# Patient Record
Sex: FEMALE | Race: BLACK OR AFRICAN AMERICAN | NOT HISPANIC OR LATINO
[De-identification: names, ages, dates, MRNs, and addresses within clinical notes are randomized per-mention and may not be internally consistent; named-entity substitution may affect disease eponyms.]

---

## 2023-01-01 ENCOUNTER — APPOINTMENT (OUTPATIENT)
Dept: PULMONOLOGY | Facility: CLINIC | Age: 68
End: 2023-01-01
Payer: MEDICARE

## 2023-01-01 ENCOUNTER — NON-APPOINTMENT (OUTPATIENT)
Age: 68
End: 2023-01-01

## 2023-01-01 ENCOUNTER — INPATIENT (INPATIENT)
Facility: HOSPITAL | Age: 68
LOS: 10 days | Discharge: HOME HEALTH | End: 2023-08-17
Attending: INTERNAL MEDICINE | Admitting: INTERNAL MEDICINE
Payer: MEDICARE

## 2023-01-01 ENCOUNTER — APPOINTMENT (OUTPATIENT)
Dept: DERMATOLOGY | Facility: CLINIC | Age: 68
End: 2023-01-01

## 2023-01-01 ENCOUNTER — TRANSCRIPTION ENCOUNTER (OUTPATIENT)
Age: 68
End: 2023-01-01

## 2023-01-01 ENCOUNTER — APPOINTMENT (OUTPATIENT)
Dept: PULMONOLOGY | Facility: CLINIC | Age: 68
End: 2023-01-01

## 2023-01-01 VITALS
TEMPERATURE: 99 F | DIASTOLIC BLOOD PRESSURE: 66 MMHG | SYSTOLIC BLOOD PRESSURE: 149 MMHG | HEART RATE: 111 BPM | RESPIRATION RATE: 19 BRPM | OXYGEN SATURATION: 88 %

## 2023-01-01 VITALS
TEMPERATURE: 98 F | RESPIRATION RATE: 18 BRPM | OXYGEN SATURATION: 97 % | HEART RATE: 95 BPM | DIASTOLIC BLOOD PRESSURE: 67 MMHG | SYSTOLIC BLOOD PRESSURE: 125 MMHG

## 2023-01-01 DIAGNOSIS — R06.02 SHORTNESS OF BREATH: ICD-10-CM

## 2023-01-01 DIAGNOSIS — C34.90 MALIGNANT NEOPLASM OF UNSPECIFIED PART OF UNSPECIFIED BRONCHUS OR LUNG: ICD-10-CM

## 2023-01-01 DIAGNOSIS — C7A.8 OTHER MALIGNANT NEUROENDOCRINE TUMORS: ICD-10-CM

## 2023-01-01 DIAGNOSIS — D72.829 ELEVATED WHITE BLOOD CELL COUNT, UNSPECIFIED: ICD-10-CM

## 2023-01-01 DIAGNOSIS — C7B.8 OTHER MALIGNANT NEUROENDOCRINE TUMORS-C7A.8: ICD-10-CM

## 2023-01-01 DIAGNOSIS — E11.9 TYPE 2 DIABETES MELLITUS WITHOUT COMPLICATIONS: ICD-10-CM

## 2023-01-01 DIAGNOSIS — J96.01 ACUTE RESPIRATORY FAILURE WITH HYPOXIA: ICD-10-CM

## 2023-01-01 DIAGNOSIS — M89.9 DISORDER OF BONE, UNSPECIFIED: ICD-10-CM

## 2023-01-01 LAB
A1C WITH ESTIMATED AVERAGE GLUCOSE RESULT: 9 % — HIGH (ref 4–5.6)
ALBUMIN SERPL ELPH-MCNC: 3.1 G/DL — LOW (ref 3.3–5)
ALBUMIN SERPL ELPH-MCNC: 3.6 G/DL — SIGNIFICANT CHANGE UP (ref 3.3–5)
ALBUMIN SERPL ELPH-MCNC: 3.7 G/DL — SIGNIFICANT CHANGE UP (ref 3.3–5)
ALP SERPL-CCNC: 71 U/L — SIGNIFICANT CHANGE UP (ref 40–120)
ALP SERPL-CCNC: 79 U/L — SIGNIFICANT CHANGE UP (ref 40–120)
ALP SERPL-CCNC: 82 U/L — SIGNIFICANT CHANGE UP (ref 40–120)
ALT FLD-CCNC: 13 U/L — SIGNIFICANT CHANGE UP (ref 4–33)
ALT FLD-CCNC: 14 U/L — SIGNIFICANT CHANGE UP (ref 4–33)
ALT FLD-CCNC: 14 U/L — SIGNIFICANT CHANGE UP (ref 4–33)
ANION GAP SERPL CALC-SCNC: 10 MMOL/L — SIGNIFICANT CHANGE UP (ref 7–14)
ANION GAP SERPL CALC-SCNC: 10 MMOL/L — SIGNIFICANT CHANGE UP (ref 7–14)
ANION GAP SERPL CALC-SCNC: 11 MMOL/L — SIGNIFICANT CHANGE UP (ref 7–14)
ANION GAP SERPL CALC-SCNC: 12 MMOL/L — SIGNIFICANT CHANGE UP (ref 7–14)
ANION GAP SERPL CALC-SCNC: 12 MMOL/L — SIGNIFICANT CHANGE UP (ref 7–14)
ANION GAP SERPL CALC-SCNC: 13 MMOL/L — SIGNIFICANT CHANGE UP (ref 7–14)
ANION GAP SERPL CALC-SCNC: 8 MMOL/L — SIGNIFICANT CHANGE UP (ref 7–14)
APTT BLD: 28.7 SEC — SIGNIFICANT CHANGE UP (ref 24.5–35.6)
APTT BLD: 30.3 SEC — SIGNIFICANT CHANGE UP (ref 24.5–35.6)
AST SERPL-CCNC: 26 U/L — SIGNIFICANT CHANGE UP (ref 4–32)
AST SERPL-CCNC: 29 U/L — SIGNIFICANT CHANGE UP (ref 4–32)
AST SERPL-CCNC: 31 U/L — SIGNIFICANT CHANGE UP (ref 4–32)
B PERT DNA SPEC QL NAA+PROBE: SIGNIFICANT CHANGE UP
B PERT+PARAPERT DNA PNL SPEC NAA+PROBE: SIGNIFICANT CHANGE UP
BASOPHILS # BLD AUTO: 0.06 K/UL — SIGNIFICANT CHANGE UP (ref 0–0.2)
BASOPHILS # BLD AUTO: 0.07 K/UL — SIGNIFICANT CHANGE UP (ref 0–0.2)
BASOPHILS # BLD AUTO: 0.08 K/UL — SIGNIFICANT CHANGE UP (ref 0–0.2)
BASOPHILS # BLD AUTO: 0.09 K/UL — SIGNIFICANT CHANGE UP (ref 0–0.2)
BASOPHILS NFR BLD AUTO: 0.4 % — SIGNIFICANT CHANGE UP (ref 0–2)
BASOPHILS NFR BLD AUTO: 0.4 % — SIGNIFICANT CHANGE UP (ref 0–2)
BASOPHILS NFR BLD AUTO: 0.5 % — SIGNIFICANT CHANGE UP (ref 0–2)
BASOPHILS NFR BLD AUTO: 0.5 % — SIGNIFICANT CHANGE UP (ref 0–2)
BASOPHILS NFR BLD AUTO: 0.6 % — SIGNIFICANT CHANGE UP (ref 0–2)
BASOPHILS NFR BLD AUTO: 0.6 % — SIGNIFICANT CHANGE UP (ref 0–2)
BILIRUB SERPL-MCNC: 0.2 MG/DL — SIGNIFICANT CHANGE UP (ref 0.2–1.2)
BILIRUB SERPL-MCNC: 0.4 MG/DL — SIGNIFICANT CHANGE UP (ref 0.2–1.2)
BILIRUB SERPL-MCNC: 0.4 MG/DL — SIGNIFICANT CHANGE UP (ref 0.2–1.2)
BLD GP AB SCN SERPL QL: NEGATIVE — SIGNIFICANT CHANGE UP
BORDETELLA PARAPERTUSSIS (RAPRVP): SIGNIFICANT CHANGE UP
BUN SERPL-MCNC: 10 MG/DL — SIGNIFICANT CHANGE UP (ref 7–23)
BUN SERPL-MCNC: 11 MG/DL — SIGNIFICANT CHANGE UP (ref 7–23)
BUN SERPL-MCNC: 6 MG/DL — LOW (ref 7–23)
BUN SERPL-MCNC: 8 MG/DL — SIGNIFICANT CHANGE UP (ref 7–23)
BUN SERPL-MCNC: 9 MG/DL — SIGNIFICANT CHANGE UP (ref 7–23)
C PNEUM DNA SPEC QL NAA+PROBE: SIGNIFICANT CHANGE UP
CALCIUM SERPL-MCNC: 10 MG/DL — SIGNIFICANT CHANGE UP (ref 8.4–10.5)
CALCIUM SERPL-MCNC: 10.2 MG/DL — SIGNIFICANT CHANGE UP (ref 8.4–10.5)
CALCIUM SERPL-MCNC: 10.3 MG/DL — SIGNIFICANT CHANGE UP (ref 8.4–10.5)
CALCIUM SERPL-MCNC: 10.4 MG/DL — SIGNIFICANT CHANGE UP (ref 8.4–10.5)
CALCIUM SERPL-MCNC: 9.6 MG/DL — SIGNIFICANT CHANGE UP (ref 8.4–10.5)
CALCIUM SERPL-MCNC: 9.7 MG/DL — SIGNIFICANT CHANGE UP (ref 8.4–10.5)
CALCIUM SERPL-MCNC: 9.7 MG/DL — SIGNIFICANT CHANGE UP (ref 8.4–10.5)
CALCIUM SERPL-MCNC: 9.8 MG/DL — SIGNIFICANT CHANGE UP (ref 8.4–10.5)
CALCIUM SERPL-MCNC: 9.8 MG/DL — SIGNIFICANT CHANGE UP (ref 8.4–10.5)
CHLORIDE SERPL-SCNC: 92 MMOL/L — LOW (ref 98–107)
CHLORIDE SERPL-SCNC: 93 MMOL/L — LOW (ref 98–107)
CHLORIDE SERPL-SCNC: 94 MMOL/L — LOW (ref 98–107)
CHLORIDE SERPL-SCNC: 94 MMOL/L — LOW (ref 98–107)
CHLORIDE SERPL-SCNC: 95 MMOL/L — LOW (ref 98–107)
CHLORIDE SERPL-SCNC: 96 MMOL/L — LOW (ref 98–107)
CHLORIDE SERPL-SCNC: 97 MMOL/L — LOW (ref 98–107)
CHLORIDE SERPL-SCNC: 97 MMOL/L — LOW (ref 98–107)
CO2 SERPL-SCNC: 24 MMOL/L — SIGNIFICANT CHANGE UP (ref 22–31)
CO2 SERPL-SCNC: 25 MMOL/L — SIGNIFICANT CHANGE UP (ref 22–31)
CO2 SERPL-SCNC: 27 MMOL/L — SIGNIFICANT CHANGE UP (ref 22–31)
CREAT SERPL-MCNC: 0.5 MG/DL — SIGNIFICANT CHANGE UP (ref 0.5–1.3)
CREAT SERPL-MCNC: 0.53 MG/DL — SIGNIFICANT CHANGE UP (ref 0.5–1.3)
CREAT SERPL-MCNC: 0.54 MG/DL — SIGNIFICANT CHANGE UP (ref 0.5–1.3)
CREAT SERPL-MCNC: 0.54 MG/DL — SIGNIFICANT CHANGE UP (ref 0.5–1.3)
CREAT SERPL-MCNC: 0.56 MG/DL — SIGNIFICANT CHANGE UP (ref 0.5–1.3)
CREAT SERPL-MCNC: 0.57 MG/DL — SIGNIFICANT CHANGE UP (ref 0.5–1.3)
CREAT SERPL-MCNC: 0.6 MG/DL — SIGNIFICANT CHANGE UP (ref 0.5–1.3)
CREAT SERPL-MCNC: 0.6 MG/DL — SIGNIFICANT CHANGE UP (ref 0.5–1.3)
CREAT SERPL-MCNC: 0.61 MG/DL — SIGNIFICANT CHANGE UP (ref 0.5–1.3)
CULTURE RESULTS: SIGNIFICANT CHANGE UP
EGFR: 100 ML/MIN/1.73M2 — SIGNIFICANT CHANGE UP
EGFR: 100 ML/MIN/1.73M2 — SIGNIFICANT CHANGE UP
EGFR: 101 ML/MIN/1.73M2 — SIGNIFICANT CHANGE UP
EGFR: 102 ML/MIN/1.73M2 — SIGNIFICANT CHANGE UP
EGFR: 97 ML/MIN/1.73M2 — SIGNIFICANT CHANGE UP
EGFR: 98 ML/MIN/1.73M2 — SIGNIFICANT CHANGE UP
EGFR: 98 ML/MIN/1.73M2 — SIGNIFICANT CHANGE UP
EGFR: 99 ML/MIN/1.73M2 — SIGNIFICANT CHANGE UP
EGFR: 99 ML/MIN/1.73M2 — SIGNIFICANT CHANGE UP
EOSINOPHIL # BLD AUTO: 0.11 K/UL — SIGNIFICANT CHANGE UP (ref 0–0.5)
EOSINOPHIL # BLD AUTO: 0.22 K/UL — SIGNIFICANT CHANGE UP (ref 0–0.5)
EOSINOPHIL # BLD AUTO: 0.3 K/UL — SIGNIFICANT CHANGE UP (ref 0–0.5)
EOSINOPHIL # BLD AUTO: 0.32 K/UL — SIGNIFICANT CHANGE UP (ref 0–0.5)
EOSINOPHIL # BLD AUTO: 0.33 K/UL — SIGNIFICANT CHANGE UP (ref 0–0.5)
EOSINOPHIL # BLD AUTO: 0.34 K/UL — SIGNIFICANT CHANGE UP (ref 0–0.5)
EOSINOPHIL NFR BLD AUTO: 0.7 % — SIGNIFICANT CHANGE UP (ref 0–6)
EOSINOPHIL NFR BLD AUTO: 1.3 % — SIGNIFICANT CHANGE UP (ref 0–6)
EOSINOPHIL NFR BLD AUTO: 1.9 % — SIGNIFICANT CHANGE UP (ref 0–6)
EOSINOPHIL NFR BLD AUTO: 2.3 % — SIGNIFICANT CHANGE UP (ref 0–6)
EOSINOPHIL NFR BLD AUTO: 2.3 % — SIGNIFICANT CHANGE UP (ref 0–6)
EOSINOPHIL NFR BLD AUTO: 2.4 % — SIGNIFICANT CHANGE UP (ref 0–6)
ESTIMATED AVERAGE GLUCOSE: 212 — SIGNIFICANT CHANGE UP
FLUAV SUBTYP SPEC NAA+PROBE: SIGNIFICANT CHANGE UP
FLUBV RNA SPEC QL NAA+PROBE: SIGNIFICANT CHANGE UP
GLUCOSE BLDC GLUCOMTR-MCNC: 106 MG/DL — HIGH (ref 70–99)
GLUCOSE BLDC GLUCOMTR-MCNC: 118 MG/DL — HIGH (ref 70–99)
GLUCOSE BLDC GLUCOMTR-MCNC: 118 MG/DL — HIGH (ref 70–99)
GLUCOSE BLDC GLUCOMTR-MCNC: 122 MG/DL — HIGH (ref 70–99)
GLUCOSE BLDC GLUCOMTR-MCNC: 130 MG/DL — HIGH (ref 70–99)
GLUCOSE BLDC GLUCOMTR-MCNC: 136 MG/DL — HIGH (ref 70–99)
GLUCOSE BLDC GLUCOMTR-MCNC: 141 MG/DL — HIGH (ref 70–99)
GLUCOSE BLDC GLUCOMTR-MCNC: 143 MG/DL — HIGH (ref 70–99)
GLUCOSE BLDC GLUCOMTR-MCNC: 144 MG/DL — HIGH (ref 70–99)
GLUCOSE BLDC GLUCOMTR-MCNC: 144 MG/DL — HIGH (ref 70–99)
GLUCOSE BLDC GLUCOMTR-MCNC: 152 MG/DL — HIGH (ref 70–99)
GLUCOSE BLDC GLUCOMTR-MCNC: 153 MG/DL — HIGH (ref 70–99)
GLUCOSE BLDC GLUCOMTR-MCNC: 153 MG/DL — HIGH (ref 70–99)
GLUCOSE BLDC GLUCOMTR-MCNC: 154 MG/DL — HIGH (ref 70–99)
GLUCOSE BLDC GLUCOMTR-MCNC: 157 MG/DL — HIGH (ref 70–99)
GLUCOSE BLDC GLUCOMTR-MCNC: 157 MG/DL — HIGH (ref 70–99)
GLUCOSE BLDC GLUCOMTR-MCNC: 161 MG/DL — HIGH (ref 70–99)
GLUCOSE BLDC GLUCOMTR-MCNC: 161 MG/DL — HIGH (ref 70–99)
GLUCOSE BLDC GLUCOMTR-MCNC: 167 MG/DL — HIGH (ref 70–99)
GLUCOSE BLDC GLUCOMTR-MCNC: 169 MG/DL — HIGH (ref 70–99)
GLUCOSE BLDC GLUCOMTR-MCNC: 175 MG/DL — HIGH (ref 70–99)
GLUCOSE BLDC GLUCOMTR-MCNC: 178 MG/DL — HIGH (ref 70–99)
GLUCOSE BLDC GLUCOMTR-MCNC: 178 MG/DL — HIGH (ref 70–99)
GLUCOSE BLDC GLUCOMTR-MCNC: 179 MG/DL — HIGH (ref 70–99)
GLUCOSE BLDC GLUCOMTR-MCNC: 181 MG/DL — HIGH (ref 70–99)
GLUCOSE BLDC GLUCOMTR-MCNC: 181 MG/DL — HIGH (ref 70–99)
GLUCOSE BLDC GLUCOMTR-MCNC: 191 MG/DL — HIGH (ref 70–99)
GLUCOSE BLDC GLUCOMTR-MCNC: 195 MG/DL — HIGH (ref 70–99)
GLUCOSE BLDC GLUCOMTR-MCNC: 196 MG/DL — HIGH (ref 70–99)
GLUCOSE BLDC GLUCOMTR-MCNC: 209 MG/DL — HIGH (ref 70–99)
GLUCOSE BLDC GLUCOMTR-MCNC: 211 MG/DL — HIGH (ref 70–99)
GLUCOSE BLDC GLUCOMTR-MCNC: 215 MG/DL — HIGH (ref 70–99)
GLUCOSE BLDC GLUCOMTR-MCNC: 225 MG/DL — HIGH (ref 70–99)
GLUCOSE BLDC GLUCOMTR-MCNC: 227 MG/DL — HIGH (ref 70–99)
GLUCOSE BLDC GLUCOMTR-MCNC: 228 MG/DL — HIGH (ref 70–99)
GLUCOSE BLDC GLUCOMTR-MCNC: 229 MG/DL — HIGH (ref 70–99)
GLUCOSE BLDC GLUCOMTR-MCNC: 246 MG/DL — HIGH (ref 70–99)
GLUCOSE BLDC GLUCOMTR-MCNC: 267 MG/DL — HIGH (ref 70–99)
GLUCOSE BLDC GLUCOMTR-MCNC: 269 MG/DL — HIGH (ref 70–99)
GLUCOSE BLDC GLUCOMTR-MCNC: 273 MG/DL — HIGH (ref 70–99)
GLUCOSE BLDC GLUCOMTR-MCNC: 277 MG/DL — HIGH (ref 70–99)
GLUCOSE BLDC GLUCOMTR-MCNC: 280 MG/DL — HIGH (ref 70–99)
GLUCOSE BLDC GLUCOMTR-MCNC: 281 MG/DL — HIGH (ref 70–99)
GLUCOSE SERPL-MCNC: 120 MG/DL — HIGH (ref 70–99)
GLUCOSE SERPL-MCNC: 124 MG/DL — HIGH (ref 70–99)
GLUCOSE SERPL-MCNC: 137 MG/DL — HIGH (ref 70–99)
GLUCOSE SERPL-MCNC: 139 MG/DL — HIGH (ref 70–99)
GLUCOSE SERPL-MCNC: 143 MG/DL — HIGH (ref 70–99)
GLUCOSE SERPL-MCNC: 178 MG/DL — HIGH (ref 70–99)
GLUCOSE SERPL-MCNC: 178 MG/DL — HIGH (ref 70–99)
GLUCOSE SERPL-MCNC: 190 MG/DL — HIGH (ref 70–99)
GLUCOSE SERPL-MCNC: 204 MG/DL — HIGH (ref 70–99)
GLUCOSE SERPL-MCNC: 281 MG/DL — HIGH (ref 70–99)
GLUCOSE SERPL-MCNC: 286 MG/DL — HIGH (ref 70–99)
GRAM STN FLD: SIGNIFICANT CHANGE UP
HADV DNA SPEC QL NAA+PROBE: SIGNIFICANT CHANGE UP
HCOV 229E RNA SPEC QL NAA+PROBE: SIGNIFICANT CHANGE UP
HCOV HKU1 RNA SPEC QL NAA+PROBE: SIGNIFICANT CHANGE UP
HCOV NL63 RNA SPEC QL NAA+PROBE: SIGNIFICANT CHANGE UP
HCOV OC43 RNA SPEC QL NAA+PROBE: SIGNIFICANT CHANGE UP
HCT VFR BLD CALC: 31.2 % — LOW (ref 34.5–45)
HCT VFR BLD CALC: 32.4 % — LOW (ref 34.5–45)
HCT VFR BLD CALC: 32.8 % — LOW (ref 34.5–45)
HCT VFR BLD CALC: 32.9 % — LOW (ref 34.5–45)
HCT VFR BLD CALC: 33.1 % — LOW (ref 34.5–45)
HCT VFR BLD CALC: 34.2 % — LOW (ref 34.5–45)
HCT VFR BLD CALC: 34.3 % — LOW (ref 34.5–45)
HCT VFR BLD CALC: 34.4 % — LOW (ref 34.5–45)
HCT VFR BLD CALC: 34.5 % — SIGNIFICANT CHANGE UP (ref 34.5–45)
HCT VFR BLD CALC: 34.7 % — SIGNIFICANT CHANGE UP (ref 34.5–45)
HCT VFR BLD CALC: 37.7 % — SIGNIFICANT CHANGE UP (ref 34.5–45)
HCV AB S/CO SERPL IA: 0.13 S/CO — SIGNIFICANT CHANGE UP (ref 0–0.99)
HCV AB SERPL-IMP: SIGNIFICANT CHANGE UP
HGB BLD-MCNC: 10.3 G/DL — LOW (ref 11.5–15.5)
HGB BLD-MCNC: 10.3 G/DL — LOW (ref 11.5–15.5)
HGB BLD-MCNC: 10.5 G/DL — LOW (ref 11.5–15.5)
HGB BLD-MCNC: 10.7 G/DL — LOW (ref 11.5–15.5)
HGB BLD-MCNC: 10.8 G/DL — LOW (ref 11.5–15.5)
HGB BLD-MCNC: 11 G/DL — LOW (ref 11.5–15.5)
HGB BLD-MCNC: 11.1 G/DL — LOW (ref 11.5–15.5)
HGB BLD-MCNC: 11.2 G/DL — LOW (ref 11.5–15.5)
HGB BLD-MCNC: 11.4 G/DL — LOW (ref 11.5–15.5)
HGB BLD-MCNC: 12.2 G/DL — SIGNIFICANT CHANGE UP (ref 11.5–15.5)
HGB BLD-MCNC: 9.8 G/DL — LOW (ref 11.5–15.5)
HMPV RNA SPEC QL NAA+PROBE: SIGNIFICANT CHANGE UP
HPIV1 RNA SPEC QL NAA+PROBE: SIGNIFICANT CHANGE UP
HPIV2 RNA SPEC QL NAA+PROBE: SIGNIFICANT CHANGE UP
HPIV3 RNA SPEC QL NAA+PROBE: SIGNIFICANT CHANGE UP
HPIV4 RNA SPEC QL NAA+PROBE: SIGNIFICANT CHANGE UP
IANC: 10.29 K/UL — HIGH (ref 1.8–7.4)
IANC: 10.69 K/UL — HIGH (ref 1.8–7.4)
IANC: 10.86 K/UL — HIGH (ref 1.8–7.4)
IANC: 11.81 K/UL — HIGH (ref 1.8–7.4)
IANC: 13.25 K/UL — HIGH (ref 1.8–7.4)
IANC: 9.16 K/UL — HIGH (ref 1.8–7.4)
IMM GRANULOCYTES NFR BLD AUTO: 0.5 % — SIGNIFICANT CHANGE UP (ref 0–0.9)
IMM GRANULOCYTES NFR BLD AUTO: 0.5 % — SIGNIFICANT CHANGE UP (ref 0–0.9)
IMM GRANULOCYTES NFR BLD AUTO: 0.7 % — SIGNIFICANT CHANGE UP (ref 0–0.9)
IMM GRANULOCYTES NFR BLD AUTO: 0.8 % — SIGNIFICANT CHANGE UP (ref 0–0.9)
IMM GRANULOCYTES NFR BLD AUTO: 0.8 % — SIGNIFICANT CHANGE UP (ref 0–0.9)
IMM GRANULOCYTES NFR BLD AUTO: 1 % — HIGH (ref 0–0.9)
INR BLD: 1.05 RATIO — SIGNIFICANT CHANGE UP (ref 0.85–1.18)
INR BLD: 1.17 RATIO — SIGNIFICANT CHANGE UP (ref 0.85–1.18)
LYMPHOCYTES # BLD AUTO: 1.72 K/UL — SIGNIFICANT CHANGE UP (ref 1–3.3)
LYMPHOCYTES # BLD AUTO: 1.81 K/UL — SIGNIFICANT CHANGE UP (ref 1–3.3)
LYMPHOCYTES # BLD AUTO: 10.8 % — LOW (ref 13–44)
LYMPHOCYTES # BLD AUTO: 11.9 % — LOW (ref 13–44)
LYMPHOCYTES # BLD AUTO: 15.6 % — SIGNIFICANT CHANGE UP (ref 13–44)
LYMPHOCYTES # BLD AUTO: 16.4 % — SIGNIFICANT CHANGE UP (ref 13–44)
LYMPHOCYTES # BLD AUTO: 16.7 % — SIGNIFICANT CHANGE UP (ref 13–44)
LYMPHOCYTES # BLD AUTO: 18 % — SIGNIFICANT CHANGE UP (ref 13–44)
LYMPHOCYTES # BLD AUTO: 2.06 K/UL — SIGNIFICANT CHANGE UP (ref 1–3.3)
LYMPHOCYTES # BLD AUTO: 2.4 K/UL — SIGNIFICANT CHANGE UP (ref 1–3.3)
LYMPHOCYTES # BLD AUTO: 2.8 K/UL — SIGNIFICANT CHANGE UP (ref 1–3.3)
LYMPHOCYTES # BLD AUTO: 2.82 K/UL — SIGNIFICANT CHANGE UP (ref 1–3.3)
M PNEUMO DNA SPEC QL NAA+PROBE: SIGNIFICANT CHANGE UP
MAGNESIUM SERPL-MCNC: 2 MG/DL — SIGNIFICANT CHANGE UP (ref 1.6–2.6)
MAGNESIUM SERPL-MCNC: 2.1 MG/DL — SIGNIFICANT CHANGE UP (ref 1.6–2.6)
MAGNESIUM SERPL-MCNC: 2.3 MG/DL — SIGNIFICANT CHANGE UP (ref 1.6–2.6)
MAGNESIUM SERPL-MCNC: 2.4 MG/DL — SIGNIFICANT CHANGE UP (ref 1.6–2.6)
MCHC RBC-ENTMCNC: 27.7 PG — SIGNIFICANT CHANGE UP (ref 27–34)
MCHC RBC-ENTMCNC: 27.8 PG — SIGNIFICANT CHANGE UP (ref 27–34)
MCHC RBC-ENTMCNC: 28.1 PG — SIGNIFICANT CHANGE UP (ref 27–34)
MCHC RBC-ENTMCNC: 28.2 PG — SIGNIFICANT CHANGE UP (ref 27–34)
MCHC RBC-ENTMCNC: 28.7 PG — SIGNIFICANT CHANGE UP (ref 27–34)
MCHC RBC-ENTMCNC: 28.7 PG — SIGNIFICANT CHANGE UP (ref 27–34)
MCHC RBC-ENTMCNC: 28.8 PG — SIGNIFICANT CHANGE UP (ref 27–34)
MCHC RBC-ENTMCNC: 31.3 GM/DL — LOW (ref 32–36)
MCHC RBC-ENTMCNC: 31.4 GM/DL — LOW (ref 32–36)
MCHC RBC-ENTMCNC: 31.6 GM/DL — LOW (ref 32–36)
MCHC RBC-ENTMCNC: 31.8 GM/DL — LOW (ref 32–36)
MCHC RBC-ENTMCNC: 32 GM/DL — SIGNIFICANT CHANGE UP (ref 32–36)
MCHC RBC-ENTMCNC: 32 GM/DL — SIGNIFICANT CHANGE UP (ref 32–36)
MCHC RBC-ENTMCNC: 32.1 GM/DL — SIGNIFICANT CHANGE UP (ref 32–36)
MCHC RBC-ENTMCNC: 32.3 GM/DL — SIGNIFICANT CHANGE UP (ref 32–36)
MCHC RBC-ENTMCNC: 32.4 GM/DL — SIGNIFICANT CHANGE UP (ref 32–36)
MCHC RBC-ENTMCNC: 32.5 GM/DL — SIGNIFICANT CHANGE UP (ref 32–36)
MCHC RBC-ENTMCNC: 33.1 GM/DL — SIGNIFICANT CHANGE UP (ref 32–36)
MCV RBC AUTO: 85.9 FL — SIGNIFICANT CHANGE UP (ref 80–100)
MCV RBC AUTO: 86.6 FL — SIGNIFICANT CHANGE UP (ref 80–100)
MCV RBC AUTO: 86.7 FL — SIGNIFICANT CHANGE UP (ref 80–100)
MCV RBC AUTO: 87.5 FL — SIGNIFICANT CHANGE UP (ref 80–100)
MCV RBC AUTO: 88.3 FL — SIGNIFICANT CHANGE UP (ref 80–100)
MCV RBC AUTO: 88.4 FL — SIGNIFICANT CHANGE UP (ref 80–100)
MCV RBC AUTO: 88.8 FL — SIGNIFICANT CHANGE UP (ref 80–100)
MCV RBC AUTO: 89 FL — SIGNIFICANT CHANGE UP (ref 80–100)
MCV RBC AUTO: 89.3 FL — SIGNIFICANT CHANGE UP (ref 80–100)
MCV RBC AUTO: 89.4 FL — SIGNIFICANT CHANGE UP (ref 80–100)
MCV RBC AUTO: 89.6 FL — SIGNIFICANT CHANGE UP (ref 80–100)
MONOCYTES # BLD AUTO: 1.41 K/UL — HIGH (ref 0–0.9)
MONOCYTES # BLD AUTO: 1.46 K/UL — HIGH (ref 0–0.9)
MONOCYTES # BLD AUTO: 1.47 K/UL — HIGH (ref 0–0.9)
MONOCYTES # BLD AUTO: 1.49 K/UL — HIGH (ref 0–0.9)
MONOCYTES # BLD AUTO: 1.5 K/UL — HIGH (ref 0–0.9)
MONOCYTES # BLD AUTO: 1.75 K/UL — HIGH (ref 0–0.9)
MONOCYTES NFR BLD AUTO: 10.4 % — SIGNIFICANT CHANGE UP (ref 2–14)
MONOCYTES NFR BLD AUTO: 10.4 % — SIGNIFICANT CHANGE UP (ref 2–14)
MONOCYTES NFR BLD AUTO: 11.2 % — SIGNIFICANT CHANGE UP (ref 2–14)
MONOCYTES NFR BLD AUTO: 8.9 % — SIGNIFICANT CHANGE UP (ref 2–14)
MONOCYTES NFR BLD AUTO: 9.3 % — SIGNIFICANT CHANGE UP (ref 2–14)
MONOCYTES NFR BLD AUTO: 9.7 % — SIGNIFICANT CHANGE UP (ref 2–14)
NEUTROPHILS # BLD AUTO: 10.29 K/UL — HIGH (ref 1.8–7.4)
NEUTROPHILS # BLD AUTO: 10.69 K/UL — HIGH (ref 1.8–7.4)
NEUTROPHILS # BLD AUTO: 10.86 K/UL — HIGH (ref 1.8–7.4)
NEUTROPHILS # BLD AUTO: 11.81 K/UL — HIGH (ref 1.8–7.4)
NEUTROPHILS # BLD AUTO: 13.25 K/UL — HIGH (ref 1.8–7.4)
NEUTROPHILS # BLD AUTO: 9.16 K/UL — HIGH (ref 1.8–7.4)
NEUTROPHILS NFR BLD AUTO: 69.5 % — SIGNIFICANT CHANGE UP (ref 43–77)
NEUTROPHILS NFR BLD AUTO: 69.5 % — SIGNIFICANT CHANGE UP (ref 43–77)
NEUTROPHILS NFR BLD AUTO: 70.4 % — SIGNIFICANT CHANGE UP (ref 43–77)
NEUTROPHILS NFR BLD AUTO: 70.6 % — SIGNIFICANT CHANGE UP (ref 43–77)
NEUTROPHILS NFR BLD AUTO: 73.8 % — SIGNIFICANT CHANGE UP (ref 43–77)
NEUTROPHILS NFR BLD AUTO: 78.7 % — HIGH (ref 43–77)
NIGHT BLUE STAIN TISS: SIGNIFICANT CHANGE UP
NRBC # BLD: 0 /100 WBCS — SIGNIFICANT CHANGE UP (ref 0–0)
NRBC # FLD: 0 K/UL — SIGNIFICANT CHANGE UP (ref 0–0)
PHOSPHATE SERPL-MCNC: 2.6 MG/DL — SIGNIFICANT CHANGE UP (ref 2.5–4.5)
PHOSPHATE SERPL-MCNC: 2.6 MG/DL — SIGNIFICANT CHANGE UP (ref 2.5–4.5)
PHOSPHATE SERPL-MCNC: 2.7 MG/DL — SIGNIFICANT CHANGE UP (ref 2.5–4.5)
PHOSPHATE SERPL-MCNC: 2.8 MG/DL — SIGNIFICANT CHANGE UP (ref 2.5–4.5)
PHOSPHATE SERPL-MCNC: 2.9 MG/DL — SIGNIFICANT CHANGE UP (ref 2.5–4.5)
PHOSPHATE SERPL-MCNC: 3.1 MG/DL — SIGNIFICANT CHANGE UP (ref 2.5–4.5)
PHOSPHATE SERPL-MCNC: 3.1 MG/DL — SIGNIFICANT CHANGE UP (ref 2.5–4.5)
PHOSPHATE SERPL-MCNC: 3.3 MG/DL — SIGNIFICANT CHANGE UP (ref 2.5–4.5)
PHOSPHATE SERPL-MCNC: 3.3 MG/DL — SIGNIFICANT CHANGE UP (ref 2.5–4.5)
PLATELET # BLD AUTO: 562 K/UL — HIGH (ref 150–400)
PLATELET # BLD AUTO: 577 K/UL — HIGH (ref 150–400)
PLATELET # BLD AUTO: 614 K/UL — HIGH (ref 150–400)
PLATELET # BLD AUTO: 623 K/UL — HIGH (ref 150–400)
PLATELET # BLD AUTO: 660 K/UL — HIGH (ref 150–400)
PLATELET # BLD AUTO: 665 K/UL — HIGH (ref 150–400)
PLATELET # BLD AUTO: 665 K/UL — HIGH (ref 150–400)
PLATELET # BLD AUTO: 666 K/UL — HIGH (ref 150–400)
PLATELET # BLD AUTO: 667 K/UL — HIGH (ref 150–400)
PLATELET # BLD AUTO: 667 K/UL — HIGH (ref 150–400)
PLATELET # BLD AUTO: 696 K/UL — HIGH (ref 150–400)
POTASSIUM SERPL-MCNC: 3.9 MMOL/L — SIGNIFICANT CHANGE UP (ref 3.5–5.3)
POTASSIUM SERPL-MCNC: 4 MMOL/L — SIGNIFICANT CHANGE UP (ref 3.5–5.3)
POTASSIUM SERPL-MCNC: 4.2 MMOL/L — SIGNIFICANT CHANGE UP (ref 3.5–5.3)
POTASSIUM SERPL-MCNC: 4.3 MMOL/L — SIGNIFICANT CHANGE UP (ref 3.5–5.3)
POTASSIUM SERPL-MCNC: 4.4 MMOL/L — SIGNIFICANT CHANGE UP (ref 3.5–5.3)
POTASSIUM SERPL-MCNC: 4.5 MMOL/L — SIGNIFICANT CHANGE UP (ref 3.5–5.3)
POTASSIUM SERPL-MCNC: 4.6 MMOL/L — SIGNIFICANT CHANGE UP (ref 3.5–5.3)
POTASSIUM SERPL-SCNC: 3.9 MMOL/L — SIGNIFICANT CHANGE UP (ref 3.5–5.3)
POTASSIUM SERPL-SCNC: 4 MMOL/L — SIGNIFICANT CHANGE UP (ref 3.5–5.3)
POTASSIUM SERPL-SCNC: 4.2 MMOL/L — SIGNIFICANT CHANGE UP (ref 3.5–5.3)
POTASSIUM SERPL-SCNC: 4.3 MMOL/L — SIGNIFICANT CHANGE UP (ref 3.5–5.3)
POTASSIUM SERPL-SCNC: 4.4 MMOL/L — SIGNIFICANT CHANGE UP (ref 3.5–5.3)
POTASSIUM SERPL-SCNC: 4.5 MMOL/L — SIGNIFICANT CHANGE UP (ref 3.5–5.3)
POTASSIUM SERPL-SCNC: 4.6 MMOL/L — SIGNIFICANT CHANGE UP (ref 3.5–5.3)
PROT SERPL-MCNC: 7.8 G/DL — SIGNIFICANT CHANGE UP (ref 6–8.3)
PROT SERPL-MCNC: 8.2 G/DL — SIGNIFICANT CHANGE UP (ref 6–8.3)
PROT SERPL-MCNC: 8.3 G/DL — SIGNIFICANT CHANGE UP (ref 6–8.3)
PROTHROM AB SERPL-ACNC: 11.9 SEC — SIGNIFICANT CHANGE UP (ref 9.5–13)
PROTHROM AB SERPL-ACNC: 13 SEC — SIGNIFICANT CHANGE UP (ref 9.5–13)
RAPID RVP RESULT: SIGNIFICANT CHANGE UP
RBC # BLD: 3.49 M/UL — LOW (ref 3.8–5.2)
RBC # BLD: 3.65 M/UL — LOW (ref 3.8–5.2)
RBC # BLD: 3.66 M/UL — LOW (ref 3.8–5.2)
RBC # BLD: 3.72 M/UL — LOW (ref 3.8–5.2)
RBC # BLD: 3.72 M/UL — LOW (ref 3.8–5.2)
RBC # BLD: 3.83 M/UL — SIGNIFICANT CHANGE UP (ref 3.8–5.2)
RBC # BLD: 3.92 M/UL — SIGNIFICANT CHANGE UP (ref 3.8–5.2)
RBC # BLD: 3.93 M/UL — SIGNIFICANT CHANGE UP (ref 3.8–5.2)
RBC # BLD: 3.97 M/UL — SIGNIFICANT CHANGE UP (ref 3.8–5.2)
RBC # BLD: 3.98 M/UL — SIGNIFICANT CHANGE UP (ref 3.8–5.2)
RBC # BLD: 4.39 M/UL — SIGNIFICANT CHANGE UP (ref 3.8–5.2)
RBC # FLD: 15.9 % — HIGH (ref 10.3–14.5)
RBC # FLD: 15.9 % — HIGH (ref 10.3–14.5)
RBC # FLD: 16 % — HIGH (ref 10.3–14.5)
RBC # FLD: 16.1 % — HIGH (ref 10.3–14.5)
RBC # FLD: 16.2 % — HIGH (ref 10.3–14.5)
RBC # FLD: 16.2 % — HIGH (ref 10.3–14.5)
RBC # FLD: 16.3 % — HIGH (ref 10.3–14.5)
RBC # FLD: 16.4 % — HIGH (ref 10.3–14.5)
RH IG SCN BLD-IMP: POSITIVE — SIGNIFICANT CHANGE UP
RSV RNA SPEC QL NAA+PROBE: SIGNIFICANT CHANGE UP
RV+EV RNA SPEC QL NAA+PROBE: SIGNIFICANT CHANGE UP
SARS-COV-2 RNA SPEC QL NAA+PROBE: SIGNIFICANT CHANGE UP
SODIUM SERPL-SCNC: 127 MMOL/L — LOW (ref 135–145)
SODIUM SERPL-SCNC: 130 MMOL/L — LOW (ref 135–145)
SODIUM SERPL-SCNC: 131 MMOL/L — LOW (ref 135–145)
SODIUM SERPL-SCNC: 132 MMOL/L — LOW (ref 135–145)
SODIUM SERPL-SCNC: 133 MMOL/L — LOW (ref 135–145)
SODIUM SERPL-SCNC: 133 MMOL/L — LOW (ref 135–145)
SODIUM SERPL-SCNC: 135 MMOL/L — SIGNIFICANT CHANGE UP (ref 135–145)
SODIUM UR-SCNC: 50 MMOL/L — SIGNIFICANT CHANGE UP
SPECIMEN SOURCE: SIGNIFICANT CHANGE UP
TROPONIN T, HIGH SENSITIVITY RESULT: 11 NG/L — SIGNIFICANT CHANGE UP
TSH SERPL-MCNC: 1.48 UIU/ML — SIGNIFICANT CHANGE UP (ref 0.27–4.2)
WBC # BLD: 13.18 K/UL — HIGH (ref 3.8–10.5)
WBC # BLD: 13.88 K/UL — HIGH (ref 3.8–10.5)
WBC # BLD: 14.47 K/UL — HIGH (ref 3.8–10.5)
WBC # BLD: 14.6 K/UL — HIGH (ref 3.8–10.5)
WBC # BLD: 14.84 K/UL — HIGH (ref 3.8–10.5)
WBC # BLD: 14.96 K/UL — HIGH (ref 3.8–10.5)
WBC # BLD: 15.06 K/UL — HIGH (ref 3.8–10.5)
WBC # BLD: 15.64 K/UL — HIGH (ref 3.8–10.5)
WBC # BLD: 16.77 K/UL — HIGH (ref 3.8–10.5)
WBC # BLD: 16.82 K/UL — HIGH (ref 3.8–10.5)
WBC # BLD: 17.68 K/UL — HIGH (ref 3.8–10.5)
WBC # FLD AUTO: 13.18 K/UL — HIGH (ref 3.8–10.5)
WBC # FLD AUTO: 13.88 K/UL — HIGH (ref 3.8–10.5)
WBC # FLD AUTO: 14.47 K/UL — HIGH (ref 3.8–10.5)
WBC # FLD AUTO: 14.6 K/UL — HIGH (ref 3.8–10.5)
WBC # FLD AUTO: 14.84 K/UL — HIGH (ref 3.8–10.5)
WBC # FLD AUTO: 14.96 K/UL — HIGH (ref 3.8–10.5)
WBC # FLD AUTO: 15.06 K/UL — HIGH (ref 3.8–10.5)
WBC # FLD AUTO: 15.64 K/UL — HIGH (ref 3.8–10.5)
WBC # FLD AUTO: 16.77 K/UL — HIGH (ref 3.8–10.5)
WBC # FLD AUTO: 16.82 K/UL — HIGH (ref 3.8–10.5)
WBC # FLD AUTO: 17.68 K/UL — HIGH (ref 3.8–10.5)

## 2023-01-01 PROCEDURE — 78306 BONE IMAGING WHOLE BODY: CPT | Mod: 26

## 2023-01-01 PROCEDURE — 31630 BRONCHOSCOPY DILATE/FX REPR: CPT | Mod: GC

## 2023-01-01 PROCEDURE — 99285 EMERGENCY DEPT VISIT HI MDM: CPT

## 2023-01-01 PROCEDURE — 72157 MRI CHEST SPINE W/O & W/DYE: CPT | Mod: 26

## 2023-01-01 PROCEDURE — 99221 1ST HOSP IP/OBS SF/LOW 40: CPT

## 2023-01-01 PROCEDURE — 31645 BRNCHSC W/THER ASPIR 1ST: CPT | Mod: GC

## 2023-01-01 PROCEDURE — 99233 SBSQ HOSP IP/OBS HIGH 50: CPT | Mod: GC

## 2023-01-01 PROCEDURE — 31624 DX BRONCHOSCOPE/LAVAGE: CPT | Mod: GC

## 2023-01-01 PROCEDURE — 72125 CT NECK SPINE W/O DYE: CPT | Mod: 26

## 2023-01-01 PROCEDURE — 71045 X-RAY EXAM CHEST 1 VIEW: CPT | Mod: 26

## 2023-01-01 PROCEDURE — 71275 CT ANGIOGRAPHY CHEST: CPT | Mod: 26,MB

## 2023-01-01 PROCEDURE — 99223 1ST HOSP IP/OBS HIGH 75: CPT

## 2023-01-01 PROCEDURE — 72131 CT LUMBAR SPINE W/O DYE: CPT | Mod: 26

## 2023-01-01 PROCEDURE — 31641 BRONCHOSCOPY TREAT BLOCKAGE: CPT | Mod: GC

## 2023-01-01 PROCEDURE — 72158 MRI LUMBAR SPINE W/O & W/DYE: CPT | Mod: 26

## 2023-01-01 PROCEDURE — 73552 X-RAY EXAM OF FEMUR 2/>: CPT | Mod: 26,RT

## 2023-01-01 PROCEDURE — 78803 RP LOCLZJ TUM SPECT 1 AREA: CPT | Mod: 26

## 2023-01-01 PROCEDURE — 99496 TRANSJ CARE MGMT HIGH F2F 7D: CPT | Mod: 95

## 2023-01-01 PROCEDURE — 99233 SBSQ HOSP IP/OBS HIGH 50: CPT | Mod: GC,25

## 2023-01-01 PROCEDURE — 99222 1ST HOSP IP/OBS MODERATE 55: CPT

## 2023-01-01 DEVICE — BLLN CATH ELATION PULMONARY 8 9 10X30MM: Type: IMPLANTABLE DEVICE | Status: FUNCTIONAL

## 2023-01-01 RX ORDER — CEFTRIAXONE 500 MG/1
1000 INJECTION, POWDER, FOR SOLUTION INTRAMUSCULAR; INTRAVENOUS EVERY 24 HOURS
Refills: 0 | Status: COMPLETED | OUTPATIENT
Start: 2023-01-01 | End: 2023-01-01

## 2023-01-01 RX ORDER — SODIUM CHLORIDE 9 MG/ML
1 INJECTION INTRAMUSCULAR; INTRAVENOUS; SUBCUTANEOUS
Refills: 0 | Status: DISCONTINUED | OUTPATIENT
Start: 2023-01-01 | End: 2023-01-01

## 2023-01-01 RX ORDER — SENNA PLUS 8.6 MG/1
2 TABLET ORAL AT BEDTIME
Refills: 0 | Status: DISCONTINUED | OUTPATIENT
Start: 2023-01-01 | End: 2023-01-01

## 2023-01-01 RX ORDER — CEFTRIAXONE 500 MG/1
INJECTION, POWDER, FOR SOLUTION INTRAMUSCULAR; INTRAVENOUS
Refills: 0 | Status: COMPLETED | OUTPATIENT
Start: 2023-01-01 | End: 2023-01-01

## 2023-01-01 RX ORDER — SODIUM CHLORIDE 9 MG/ML
1 INJECTION INTRAMUSCULAR; INTRAVENOUS; SUBCUTANEOUS THREE TIMES A DAY
Refills: 0 | Status: COMPLETED | OUTPATIENT
Start: 2023-01-01 | End: 2023-01-01

## 2023-01-01 RX ORDER — MORPHINE SULFATE 50 MG/1
4 CAPSULE, EXTENDED RELEASE ORAL ONCE
Refills: 0 | Status: DISCONTINUED | OUTPATIENT
Start: 2023-01-01 | End: 2023-01-01

## 2023-01-01 RX ORDER — TIOTROPIUM BROMIDE 18 UG/1
2 CAPSULE ORAL; RESPIRATORY (INHALATION) ONCE
Refills: 0 | Status: COMPLETED | OUTPATIENT
Start: 2023-01-01 | End: 2023-01-01

## 2023-01-01 RX ORDER — FENTANYL CITRATE 50 UG/ML
50 INJECTION INTRAVENOUS
Refills: 0 | Status: DISCONTINUED | OUTPATIENT
Start: 2023-01-01 | End: 2023-01-01

## 2023-01-01 RX ORDER — INSULIN GLARGINE 100 [IU]/ML
10 INJECTION, SOLUTION SUBCUTANEOUS
Refills: 0 | DISCHARGE

## 2023-01-01 RX ORDER — INSULIN GLARGINE 100 [IU]/ML
8 INJECTION, SOLUTION SUBCUTANEOUS AT BEDTIME
Refills: 0 | Status: DISCONTINUED | OUTPATIENT
Start: 2023-01-01 | End: 2023-01-01

## 2023-01-01 RX ORDER — KETOCONAZOLE 20 MG/G
1 AEROSOL, FOAM TOPICAL
Refills: 0 | Status: DISCONTINUED | OUTPATIENT
Start: 2023-01-01 | End: 2023-01-01

## 2023-01-01 RX ORDER — DEXTROSE 50 % IN WATER 50 %
25 SYRINGE (ML) INTRAVENOUS ONCE
Refills: 0 | Status: DISCONTINUED | OUTPATIENT
Start: 2023-01-01 | End: 2023-01-01

## 2023-01-01 RX ORDER — LANOLIN ALCOHOL/MO/W.PET/CERES
6 CREAM (GRAM) TOPICAL ONCE
Refills: 0 | Status: COMPLETED | OUTPATIENT
Start: 2023-01-01 | End: 2023-01-01

## 2023-01-01 RX ORDER — SODIUM CHLORIDE 9 MG/ML
1000 INJECTION, SOLUTION INTRAVENOUS
Refills: 0 | Status: DISCONTINUED | OUTPATIENT
Start: 2023-01-01 | End: 2023-01-01

## 2023-01-01 RX ORDER — GLUCAGON INJECTION, SOLUTION 0.5 MG/.1ML
1 INJECTION, SOLUTION SUBCUTANEOUS ONCE
Refills: 0 | Status: DISCONTINUED | OUTPATIENT
Start: 2023-01-01 | End: 2023-01-01

## 2023-01-01 RX ORDER — DEXTROSE 50 % IN WATER 50 %
12.5 SYRINGE (ML) INTRAVENOUS ONCE
Refills: 0 | Status: DISCONTINUED | OUTPATIENT
Start: 2023-01-01 | End: 2023-01-01

## 2023-01-01 RX ORDER — INSULIN GLARGINE 100 [IU]/ML
5 INJECTION, SOLUTION SUBCUTANEOUS AT BEDTIME
Refills: 0 | Status: DISCONTINUED | OUTPATIENT
Start: 2023-01-01 | End: 2023-01-01

## 2023-01-01 RX ORDER — HEPARIN SODIUM 5000 [USP'U]/ML
5000 INJECTION INTRAVENOUS; SUBCUTANEOUS EVERY 12 HOURS
Refills: 0 | Status: DISCONTINUED | OUTPATIENT
Start: 2023-01-01 | End: 2023-01-01

## 2023-01-01 RX ORDER — ACETAMINOPHEN 500 MG
650 TABLET ORAL EVERY 6 HOURS
Refills: 0 | Status: DISCONTINUED | OUTPATIENT
Start: 2023-01-01 | End: 2023-01-01

## 2023-01-01 RX ORDER — ALBUTEROL 90 UG/1
2 AEROSOL, METERED ORAL
Qty: 1 | Refills: 0
Start: 2023-01-01 | End: 2023-01-01

## 2023-01-01 RX ORDER — IPRATROPIUM/ALBUTEROL SULFATE 18-103MCG
3 AEROSOL WITH ADAPTER (GRAM) INHALATION ONCE
Refills: 0 | Status: COMPLETED | OUTPATIENT
Start: 2023-01-01 | End: 2023-01-01

## 2023-01-01 RX ORDER — GLIMEPIRIDE 1 MG
1 TABLET ORAL
Refills: 0 | DISCHARGE

## 2023-01-01 RX ORDER — LANOLIN ALCOHOL/MO/W.PET/CERES
6 CREAM (GRAM) TOPICAL AT BEDTIME
Refills: 0 | Status: DISCONTINUED | OUTPATIENT
Start: 2023-01-01 | End: 2023-01-01

## 2023-01-01 RX ORDER — OXYCODONE HYDROCHLORIDE 5 MG/1
5 TABLET ORAL EVERY 6 HOURS
Refills: 0 | Status: DISCONTINUED | OUTPATIENT
Start: 2023-01-01 | End: 2023-01-01

## 2023-01-01 RX ORDER — UMECLIDINIUM 62.5 UG/1
1 AEROSOL, POWDER ORAL
Qty: 1 | Refills: 0
Start: 2023-01-01 | End: 2023-01-01

## 2023-01-01 RX ORDER — MORPHINE SULFATE 50 MG/1
2 CAPSULE, EXTENDED RELEASE ORAL EVERY 6 HOURS
Refills: 0 | Status: DISCONTINUED | OUTPATIENT
Start: 2023-01-01 | End: 2023-01-01

## 2023-01-01 RX ORDER — TIOTROPIUM BROMIDE 18 UG/1
2 CAPSULE ORAL; RESPIRATORY (INHALATION)
Qty: 1 | Refills: 0
Start: 2023-01-01 | End: 2023-01-01

## 2023-01-01 RX ORDER — FENTANYL CITRATE 50 UG/ML
25 INJECTION INTRAVENOUS
Refills: 0 | Status: DISCONTINUED | OUTPATIENT
Start: 2023-01-01 | End: 2023-01-01

## 2023-01-01 RX ORDER — CEFTRIAXONE 500 MG/1
1000 INJECTION, POWDER, FOR SOLUTION INTRAMUSCULAR; INTRAVENOUS ONCE
Refills: 0 | Status: COMPLETED | OUTPATIENT
Start: 2023-01-01 | End: 2023-01-01

## 2023-01-01 RX ORDER — IPRATROPIUM/ALBUTEROL SULFATE 18-103MCG
3 AEROSOL WITH ADAPTER (GRAM) INHALATION EVERY 6 HOURS
Refills: 0 | Status: DISCONTINUED | OUTPATIENT
Start: 2023-01-01 | End: 2023-01-01

## 2023-01-01 RX ORDER — OXYCODONE HYDROCHLORIDE 5 MG/1
5 TABLET ORAL ONCE
Refills: 0 | Status: DISCONTINUED | OUTPATIENT
Start: 2023-01-01 | End: 2023-01-01

## 2023-01-01 RX ORDER — SEMAGLUTIDE 0.68 MG/ML
0.5 INJECTION, SOLUTION SUBCUTANEOUS
Refills: 0 | DISCHARGE

## 2023-01-01 RX ORDER — TIOTROPIUM BROMIDE 18 UG/1
2 CAPSULE ORAL; RESPIRATORY (INHALATION) DAILY
Refills: 0 | Status: DISCONTINUED | OUTPATIENT
Start: 2023-01-01 | End: 2023-01-01

## 2023-01-01 RX ORDER — METFORMIN HYDROCHLORIDE 850 MG/1
1 TABLET ORAL
Refills: 0 | DISCHARGE

## 2023-01-01 RX ORDER — INSULIN LISPRO 100/ML
VIAL (ML) SUBCUTANEOUS AT BEDTIME
Refills: 0 | Status: DISCONTINUED | OUTPATIENT
Start: 2023-01-01 | End: 2023-01-01

## 2023-01-01 RX ORDER — DEXTROSE 50 % IN WATER 50 %
15 SYRINGE (ML) INTRAVENOUS ONCE
Refills: 0 | Status: DISCONTINUED | OUTPATIENT
Start: 2023-01-01 | End: 2023-01-01

## 2023-01-01 RX ORDER — INSULIN LISPRO 100/ML
VIAL (ML) SUBCUTANEOUS
Refills: 0 | Status: DISCONTINUED | OUTPATIENT
Start: 2023-01-01 | End: 2023-01-01

## 2023-01-01 RX ORDER — POLYETHYLENE GLYCOL 3350 17 G/17G
17 POWDER, FOR SOLUTION ORAL DAILY
Refills: 0 | Status: DISCONTINUED | OUTPATIENT
Start: 2023-01-01 | End: 2023-01-01

## 2023-01-01 RX ADMIN — POLYETHYLENE GLYCOL 3350 17 GRAM(S): 17 POWDER, FOR SOLUTION ORAL at 12:16

## 2023-01-01 RX ADMIN — INSULIN GLARGINE 8 UNIT(S): 100 INJECTION, SOLUTION SUBCUTANEOUS at 21:08

## 2023-01-01 RX ADMIN — Medication 3 MILLILITER(S): at 21:46

## 2023-01-01 RX ADMIN — OXYCODONE HYDROCHLORIDE 5 MILLIGRAM(S): 5 TABLET ORAL at 13:54

## 2023-01-01 RX ADMIN — SENNA PLUS 2 TABLET(S): 8.6 TABLET ORAL at 21:33

## 2023-01-01 RX ADMIN — POLYETHYLENE GLYCOL 3350 17 GRAM(S): 17 POWDER, FOR SOLUTION ORAL at 13:02

## 2023-01-01 RX ADMIN — Medication 2: at 11:46

## 2023-01-01 RX ADMIN — TIOTROPIUM BROMIDE 2 PUFF(S): 18 CAPSULE ORAL; RESPIRATORY (INHALATION) at 23:59

## 2023-01-01 RX ADMIN — Medication 650 MILLIGRAM(S): at 11:27

## 2023-01-01 RX ADMIN — SODIUM CHLORIDE 1 GRAM(S): 9 INJECTION INTRAMUSCULAR; INTRAVENOUS; SUBCUTANEOUS at 21:08

## 2023-01-01 RX ADMIN — HEPARIN SODIUM 5000 UNIT(S): 5000 INJECTION INTRAVENOUS; SUBCUTANEOUS at 18:26

## 2023-01-01 RX ADMIN — CEFTRIAXONE 100 MILLIGRAM(S): 500 INJECTION, POWDER, FOR SOLUTION INTRAMUSCULAR; INTRAVENOUS at 01:39

## 2023-01-01 RX ADMIN — INSULIN GLARGINE 8 UNIT(S): 100 INJECTION, SOLUTION SUBCUTANEOUS at 21:34

## 2023-01-01 RX ADMIN — Medication 1: at 18:32

## 2023-01-01 RX ADMIN — OXYCODONE HYDROCHLORIDE 5 MILLIGRAM(S): 5 TABLET ORAL at 22:05

## 2023-01-01 RX ADMIN — Medication 650 MILLIGRAM(S): at 01:34

## 2023-01-01 RX ADMIN — SODIUM CHLORIDE 1 GRAM(S): 9 INJECTION INTRAMUSCULAR; INTRAVENOUS; SUBCUTANEOUS at 17:35

## 2023-01-01 RX ADMIN — INSULIN GLARGINE 8 UNIT(S): 100 INJECTION, SOLUTION SUBCUTANEOUS at 21:49

## 2023-01-01 RX ADMIN — SODIUM CHLORIDE 1 GRAM(S): 9 INJECTION INTRAMUSCULAR; INTRAVENOUS; SUBCUTANEOUS at 13:30

## 2023-01-01 RX ADMIN — HEPARIN SODIUM 5000 UNIT(S): 5000 INJECTION INTRAVENOUS; SUBCUTANEOUS at 17:14

## 2023-01-01 RX ADMIN — OXYCODONE HYDROCHLORIDE 5 MILLIGRAM(S): 5 TABLET ORAL at 15:43

## 2023-01-01 RX ADMIN — OXYCODONE HYDROCHLORIDE 5 MILLIGRAM(S): 5 TABLET ORAL at 05:50

## 2023-01-01 RX ADMIN — SODIUM CHLORIDE 1 GRAM(S): 9 INJECTION INTRAMUSCULAR; INTRAVENOUS; SUBCUTANEOUS at 13:02

## 2023-01-01 RX ADMIN — Medication 1: at 12:57

## 2023-01-01 RX ADMIN — HEPARIN SODIUM 5000 UNIT(S): 5000 INJECTION INTRAVENOUS; SUBCUTANEOUS at 18:04

## 2023-01-01 RX ADMIN — KETOCONAZOLE 1 APPLICATION(S): 20 AEROSOL, FOAM TOPICAL at 05:43

## 2023-01-01 RX ADMIN — KETOCONAZOLE 1 APPLICATION(S): 20 AEROSOL, FOAM TOPICAL at 17:34

## 2023-01-01 RX ADMIN — Medication 650 MILLIGRAM(S): at 22:07

## 2023-01-01 RX ADMIN — TIOTROPIUM BROMIDE 2 PUFF(S): 18 CAPSULE ORAL; RESPIRATORY (INHALATION) at 11:14

## 2023-01-01 RX ADMIN — INSULIN GLARGINE 8 UNIT(S): 100 INJECTION, SOLUTION SUBCUTANEOUS at 21:36

## 2023-01-01 RX ADMIN — SODIUM CHLORIDE 1 GRAM(S): 9 INJECTION INTRAMUSCULAR; INTRAVENOUS; SUBCUTANEOUS at 17:30

## 2023-01-01 RX ADMIN — MORPHINE SULFATE 2 MILLIGRAM(S): 50 CAPSULE, EXTENDED RELEASE ORAL at 08:21

## 2023-01-01 RX ADMIN — HEPARIN SODIUM 5000 UNIT(S): 5000 INJECTION INTRAVENOUS; SUBCUTANEOUS at 05:50

## 2023-01-01 RX ADMIN — OXYCODONE HYDROCHLORIDE 5 MILLIGRAM(S): 5 TABLET ORAL at 14:55

## 2023-01-01 RX ADMIN — SODIUM CHLORIDE 1 GRAM(S): 9 INJECTION INTRAMUSCULAR; INTRAVENOUS; SUBCUTANEOUS at 05:08

## 2023-01-01 RX ADMIN — Medication 3: at 13:19

## 2023-01-01 RX ADMIN — KETOCONAZOLE 1 APPLICATION(S): 20 AEROSOL, FOAM TOPICAL at 08:48

## 2023-01-01 RX ADMIN — POLYETHYLENE GLYCOL 3350 17 GRAM(S): 17 POWDER, FOR SOLUTION ORAL at 13:48

## 2023-01-01 RX ADMIN — Medication 1: at 12:52

## 2023-01-01 RX ADMIN — OXYCODONE HYDROCHLORIDE 5 MILLIGRAM(S): 5 TABLET ORAL at 05:46

## 2023-01-01 RX ADMIN — OXYCODONE HYDROCHLORIDE 5 MILLIGRAM(S): 5 TABLET ORAL at 04:49

## 2023-01-01 RX ADMIN — INSULIN GLARGINE 8 UNIT(S): 100 INJECTION, SOLUTION SUBCUTANEOUS at 23:54

## 2023-01-01 RX ADMIN — POLYETHYLENE GLYCOL 3350 17 GRAM(S): 17 POWDER, FOR SOLUTION ORAL at 12:27

## 2023-01-01 RX ADMIN — HEPARIN SODIUM 5000 UNIT(S): 5000 INJECTION INTRAVENOUS; SUBCUTANEOUS at 17:58

## 2023-01-01 RX ADMIN — Medication 650 MILLIGRAM(S): at 09:21

## 2023-01-01 RX ADMIN — HEPARIN SODIUM 5000 UNIT(S): 5000 INJECTION INTRAVENOUS; SUBCUTANEOUS at 05:43

## 2023-01-01 RX ADMIN — OXYCODONE HYDROCHLORIDE 5 MILLIGRAM(S): 5 TABLET ORAL at 18:57

## 2023-01-01 RX ADMIN — INSULIN GLARGINE 8 UNIT(S): 100 INJECTION, SOLUTION SUBCUTANEOUS at 21:53

## 2023-01-01 RX ADMIN — Medication 6 MILLIGRAM(S): at 00:00

## 2023-01-01 RX ADMIN — Medication 650 MILLIGRAM(S): at 16:48

## 2023-01-01 RX ADMIN — HEPARIN SODIUM 5000 UNIT(S): 5000 INJECTION INTRAVENOUS; SUBCUTANEOUS at 18:06

## 2023-01-01 RX ADMIN — Medication 650 MILLIGRAM(S): at 17:35

## 2023-01-01 RX ADMIN — TIOTROPIUM BROMIDE 2 PUFF(S): 18 CAPSULE ORAL; RESPIRATORY (INHALATION) at 07:40

## 2023-01-01 RX ADMIN — INSULIN GLARGINE 5 UNIT(S): 100 INJECTION, SOLUTION SUBCUTANEOUS at 22:03

## 2023-01-01 RX ADMIN — OXYCODONE HYDROCHLORIDE 5 MILLIGRAM(S): 5 TABLET ORAL at 06:46

## 2023-01-01 RX ADMIN — TIOTROPIUM BROMIDE 2 PUFF(S): 18 CAPSULE ORAL; RESPIRATORY (INHALATION) at 11:47

## 2023-01-01 RX ADMIN — Medication 1: at 13:16

## 2023-01-01 RX ADMIN — OXYCODONE HYDROCHLORIDE 5 MILLIGRAM(S): 5 TABLET ORAL at 16:16

## 2023-01-01 RX ADMIN — Medication 650 MILLIGRAM(S): at 14:51

## 2023-01-01 RX ADMIN — OXYCODONE HYDROCHLORIDE 5 MILLIGRAM(S): 5 TABLET ORAL at 14:43

## 2023-01-01 RX ADMIN — Medication 650 MILLIGRAM(S): at 15:00

## 2023-01-01 RX ADMIN — MORPHINE SULFATE 4 MILLIGRAM(S): 50 CAPSULE, EXTENDED RELEASE ORAL at 06:30

## 2023-01-01 RX ADMIN — OXYCODONE HYDROCHLORIDE 5 MILLIGRAM(S): 5 TABLET ORAL at 08:58

## 2023-01-01 RX ADMIN — Medication 650 MILLIGRAM(S): at 08:52

## 2023-01-01 RX ADMIN — SODIUM CHLORIDE 1 GRAM(S): 9 INJECTION INTRAMUSCULAR; INTRAVENOUS; SUBCUTANEOUS at 05:46

## 2023-01-01 RX ADMIN — MORPHINE SULFATE 2 MILLIGRAM(S): 50 CAPSULE, EXTENDED RELEASE ORAL at 10:15

## 2023-01-01 RX ADMIN — Medication 650 MILLIGRAM(S): at 10:53

## 2023-01-01 RX ADMIN — OXYCODONE HYDROCHLORIDE 5 MILLIGRAM(S): 5 TABLET ORAL at 03:46

## 2023-01-01 RX ADMIN — Medication 1: at 13:18

## 2023-01-01 RX ADMIN — HEPARIN SODIUM 5000 UNIT(S): 5000 INJECTION INTRAVENOUS; SUBCUTANEOUS at 05:44

## 2023-01-01 RX ADMIN — Medication 650 MILLIGRAM(S): at 09:53

## 2023-01-01 RX ADMIN — Medication 650 MILLIGRAM(S): at 23:35

## 2023-01-01 RX ADMIN — HEPARIN SODIUM 5000 UNIT(S): 5000 INJECTION INTRAVENOUS; SUBCUTANEOUS at 05:45

## 2023-01-01 RX ADMIN — KETOCONAZOLE 1 APPLICATION(S): 20 AEROSOL, FOAM TOPICAL at 05:19

## 2023-01-01 RX ADMIN — Medication 650 MILLIGRAM(S): at 00:35

## 2023-01-01 RX ADMIN — MORPHINE SULFATE 2 MILLIGRAM(S): 50 CAPSULE, EXTENDED RELEASE ORAL at 17:53

## 2023-01-01 RX ADMIN — Medication 650 MILLIGRAM(S): at 17:48

## 2023-01-01 RX ADMIN — Medication 3 MILLILITER(S): at 10:22

## 2023-01-01 RX ADMIN — Medication 2: at 09:33

## 2023-01-01 RX ADMIN — Medication 2: at 17:47

## 2023-01-01 RX ADMIN — OXYCODONE HYDROCHLORIDE 5 MILLIGRAM(S): 5 TABLET ORAL at 02:50

## 2023-01-01 RX ADMIN — HEPARIN SODIUM 5000 UNIT(S): 5000 INJECTION INTRAVENOUS; SUBCUTANEOUS at 05:02

## 2023-01-01 RX ADMIN — POLYETHYLENE GLYCOL 3350 17 GRAM(S): 17 POWDER, FOR SOLUTION ORAL at 09:57

## 2023-01-01 RX ADMIN — INSULIN GLARGINE 8 UNIT(S): 100 INJECTION, SOLUTION SUBCUTANEOUS at 22:10

## 2023-01-01 RX ADMIN — OXYCODONE HYDROCHLORIDE 5 MILLIGRAM(S): 5 TABLET ORAL at 19:36

## 2023-01-01 RX ADMIN — SENNA PLUS 2 TABLET(S): 8.6 TABLET ORAL at 21:50

## 2023-01-01 RX ADMIN — MORPHINE SULFATE 2 MILLIGRAM(S): 50 CAPSULE, EXTENDED RELEASE ORAL at 18:30

## 2023-01-01 RX ADMIN — HEPARIN SODIUM 5000 UNIT(S): 5000 INJECTION INTRAVENOUS; SUBCUTANEOUS at 05:48

## 2023-01-01 RX ADMIN — SODIUM CHLORIDE 1 GRAM(S): 9 INJECTION INTRAMUSCULAR; INTRAVENOUS; SUBCUTANEOUS at 13:47

## 2023-01-01 RX ADMIN — Medication 3: at 19:05

## 2023-01-01 RX ADMIN — MORPHINE SULFATE 2 MILLIGRAM(S): 50 CAPSULE, EXTENDED RELEASE ORAL at 09:21

## 2023-01-01 RX ADMIN — Medication 650 MILLIGRAM(S): at 05:35

## 2023-01-01 RX ADMIN — Medication 1: at 11:36

## 2023-01-01 RX ADMIN — Medication 650 MILLIGRAM(S): at 18:35

## 2023-01-01 RX ADMIN — INSULIN GLARGINE 8 UNIT(S): 100 INJECTION, SOLUTION SUBCUTANEOUS at 21:33

## 2023-01-01 RX ADMIN — Medication 650 MILLIGRAM(S): at 08:47

## 2023-01-01 RX ADMIN — Medication 650 MILLIGRAM(S): at 21:08

## 2023-01-01 RX ADMIN — OXYCODONE HYDROCHLORIDE 5 MILLIGRAM(S): 5 TABLET ORAL at 21:05

## 2023-01-01 RX ADMIN — Medication 2: at 13:30

## 2023-01-01 RX ADMIN — MORPHINE SULFATE 4 MILLIGRAM(S): 50 CAPSULE, EXTENDED RELEASE ORAL at 05:58

## 2023-01-01 RX ADMIN — Medication 650 MILLIGRAM(S): at 02:30

## 2023-01-01 RX ADMIN — SODIUM CHLORIDE 1 GRAM(S): 9 INJECTION INTRAMUSCULAR; INTRAVENOUS; SUBCUTANEOUS at 12:46

## 2023-01-01 RX ADMIN — HEPARIN SODIUM 5000 UNIT(S): 5000 INJECTION INTRAVENOUS; SUBCUTANEOUS at 22:04

## 2023-01-01 RX ADMIN — MORPHINE SULFATE 4 MILLIGRAM(S): 50 CAPSULE, EXTENDED RELEASE ORAL at 03:35

## 2023-01-01 RX ADMIN — Medication 650 MILLIGRAM(S): at 04:40

## 2023-01-01 RX ADMIN — CEFTRIAXONE 100 MILLIGRAM(S): 500 INJECTION, POWDER, FOR SOLUTION INTRAMUSCULAR; INTRAVENOUS at 02:08

## 2023-01-01 RX ADMIN — OXYCODONE HYDROCHLORIDE 5 MILLIGRAM(S): 5 TABLET ORAL at 10:13

## 2023-01-01 RX ADMIN — HEPARIN SODIUM 5000 UNIT(S): 5000 INJECTION INTRAVENOUS; SUBCUTANEOUS at 05:22

## 2023-01-01 RX ADMIN — MORPHINE SULFATE 2 MILLIGRAM(S): 50 CAPSULE, EXTENDED RELEASE ORAL at 09:58

## 2023-01-01 RX ADMIN — Medication 1: at 13:09

## 2023-01-01 RX ADMIN — MORPHINE SULFATE 2 MILLIGRAM(S): 50 CAPSULE, EXTENDED RELEASE ORAL at 23:42

## 2023-01-01 RX ADMIN — KETOCONAZOLE 1 APPLICATION(S): 20 AEROSOL, FOAM TOPICAL at 17:59

## 2023-01-01 RX ADMIN — SODIUM CHLORIDE 1 GRAM(S): 9 INJECTION INTRAMUSCULAR; INTRAVENOUS; SUBCUTANEOUS at 21:33

## 2023-01-01 RX ADMIN — OXYCODONE HYDROCHLORIDE 5 MILLIGRAM(S): 5 TABLET ORAL at 13:27

## 2023-01-01 RX ADMIN — Medication 1: at 18:06

## 2023-01-01 RX ADMIN — SODIUM CHLORIDE 1 GRAM(S): 9 INJECTION INTRAMUSCULAR; INTRAVENOUS; SUBCUTANEOUS at 05:44

## 2023-01-01 RX ADMIN — Medication 3 MILLILITER(S): at 17:29

## 2023-01-01 RX ADMIN — POLYETHYLENE GLYCOL 3350 17 GRAM(S): 17 POWDER, FOR SOLUTION ORAL at 12:54

## 2023-01-01 RX ADMIN — HEPARIN SODIUM 5000 UNIT(S): 5000 INJECTION INTRAVENOUS; SUBCUTANEOUS at 17:34

## 2023-01-01 RX ADMIN — Medication 1: at 17:17

## 2023-01-01 RX ADMIN — Medication 1: at 18:00

## 2023-01-01 RX ADMIN — SENNA PLUS 2 TABLET(S): 8.6 TABLET ORAL at 22:09

## 2023-01-01 RX ADMIN — TIOTROPIUM BROMIDE 2 PUFF(S): 18 CAPSULE ORAL; RESPIRATORY (INHALATION) at 09:21

## 2023-01-01 RX ADMIN — OXYCODONE HYDROCHLORIDE 5 MILLIGRAM(S): 5 TABLET ORAL at 20:06

## 2023-01-01 RX ADMIN — SENNA PLUS 2 TABLET(S): 8.6 TABLET ORAL at 21:05

## 2023-01-01 RX ADMIN — HEPARIN SODIUM 5000 UNIT(S): 5000 INJECTION INTRAVENOUS; SUBCUTANEOUS at 17:49

## 2023-01-01 RX ADMIN — TIOTROPIUM BROMIDE 2 PUFF(S): 18 CAPSULE ORAL; RESPIRATORY (INHALATION) at 09:35

## 2023-01-01 RX ADMIN — OXYCODONE HYDROCHLORIDE 5 MILLIGRAM(S): 5 TABLET ORAL at 09:58

## 2023-01-01 RX ADMIN — Medication 1: at 09:44

## 2023-01-01 RX ADMIN — SODIUM CHLORIDE 1 GRAM(S): 9 INJECTION INTRAMUSCULAR; INTRAVENOUS; SUBCUTANEOUS at 05:23

## 2023-01-01 RX ADMIN — CEFTRIAXONE 100 MILLIGRAM(S): 500 INJECTION, POWDER, FOR SOLUTION INTRAMUSCULAR; INTRAVENOUS at 01:35

## 2023-01-01 RX ADMIN — SENNA PLUS 2 TABLET(S): 8.6 TABLET ORAL at 21:31

## 2023-01-01 RX ADMIN — POLYETHYLENE GLYCOL 3350 17 GRAM(S): 17 POWDER, FOR SOLUTION ORAL at 12:14

## 2023-01-01 RX ADMIN — KETOCONAZOLE 1 APPLICATION(S): 20 AEROSOL, FOAM TOPICAL at 05:49

## 2023-01-01 RX ADMIN — POLYETHYLENE GLYCOL 3350 17 GRAM(S): 17 POWDER, FOR SOLUTION ORAL at 11:31

## 2023-01-01 RX ADMIN — CEFTRIAXONE 100 MILLIGRAM(S): 500 INJECTION, POWDER, FOR SOLUTION INTRAMUSCULAR; INTRAVENOUS at 01:18

## 2023-01-01 RX ADMIN — SENNA PLUS 2 TABLET(S): 8.6 TABLET ORAL at 21:07

## 2023-01-01 RX ADMIN — INSULIN GLARGINE 8 UNIT(S): 100 INJECTION, SOLUTION SUBCUTANEOUS at 21:32

## 2023-01-01 RX ADMIN — SODIUM CHLORIDE 1 GRAM(S): 9 INJECTION INTRAMUSCULAR; INTRAVENOUS; SUBCUTANEOUS at 21:50

## 2023-01-01 RX ADMIN — Medication 650 MILLIGRAM(S): at 09:55

## 2023-01-01 RX ADMIN — Medication 1: at 18:04

## 2023-01-01 RX ADMIN — Medication 1: at 21:49

## 2023-01-01 RX ADMIN — MORPHINE SULFATE 4 MILLIGRAM(S): 50 CAPSULE, EXTENDED RELEASE ORAL at 03:05

## 2023-01-01 RX ADMIN — HEPARIN SODIUM 5000 UNIT(S): 5000 INJECTION INTRAVENOUS; SUBCUTANEOUS at 05:36

## 2023-01-01 RX ADMIN — SENNA PLUS 2 TABLET(S): 8.6 TABLET ORAL at 22:04

## 2023-01-01 RX ADMIN — KETOCONAZOLE 1 APPLICATION(S): 20 AEROSOL, FOAM TOPICAL at 17:28

## 2023-01-01 RX ADMIN — OXYCODONE HYDROCHLORIDE 5 MILLIGRAM(S): 5 TABLET ORAL at 03:30

## 2023-01-01 RX ADMIN — Medication 3: at 11:04

## 2023-01-01 RX ADMIN — Medication 650 MILLIGRAM(S): at 15:10

## 2023-01-01 RX ADMIN — Medication 1: at 08:50

## 2023-01-01 RX ADMIN — CEFTRIAXONE 100 MILLIGRAM(S): 500 INJECTION, POWDER, FOR SOLUTION INTRAMUSCULAR; INTRAVENOUS at 01:36

## 2023-01-01 RX ADMIN — SODIUM CHLORIDE 1 GRAM(S): 9 INJECTION INTRAMUSCULAR; INTRAVENOUS; SUBCUTANEOUS at 21:31

## 2023-01-01 RX ADMIN — OXYCODONE HYDROCHLORIDE 5 MILLIGRAM(S): 5 TABLET ORAL at 12:27

## 2023-01-01 RX ADMIN — Medication 2: at 13:01

## 2023-01-01 RX ADMIN — SODIUM CHLORIDE 1 GRAM(S): 9 INJECTION INTRAMUSCULAR; INTRAVENOUS; SUBCUTANEOUS at 05:48

## 2023-01-01 RX ADMIN — Medication 650 MILLIGRAM(S): at 04:35

## 2023-01-01 RX ADMIN — KETOCONAZOLE 1 APPLICATION(S): 20 AEROSOL, FOAM TOPICAL at 17:53

## 2023-01-01 RX ADMIN — POLYETHYLENE GLYCOL 3350 17 GRAM(S): 17 POWDER, FOR SOLUTION ORAL at 12:46

## 2023-01-01 RX ADMIN — Medication 650 MILLIGRAM(S): at 16:10

## 2023-01-01 RX ADMIN — OXYCODONE HYDROCHLORIDE 5 MILLIGRAM(S): 5 TABLET ORAL at 17:57

## 2023-01-01 RX ADMIN — POLYETHYLENE GLYCOL 3350 17 GRAM(S): 17 POWDER, FOR SOLUTION ORAL at 12:23

## 2023-01-01 RX ADMIN — Medication 650 MILLIGRAM(S): at 03:40

## 2023-01-01 RX ADMIN — OXYCODONE HYDROCHLORIDE 5 MILLIGRAM(S): 5 TABLET ORAL at 02:46

## 2023-01-01 RX ADMIN — TIOTROPIUM BROMIDE 2 PUFF(S): 18 CAPSULE ORAL; RESPIRATORY (INHALATION) at 10:24

## 2023-01-01 RX ADMIN — KETOCONAZOLE 1 APPLICATION(S): 20 AEROSOL, FOAM TOPICAL at 08:49

## 2023-01-01 RX ADMIN — Medication 1: at 21:38

## 2023-01-01 RX ADMIN — CEFTRIAXONE 100 MILLIGRAM(S): 500 INJECTION, POWDER, FOR SOLUTION INTRAMUSCULAR; INTRAVENOUS at 01:59

## 2023-01-01 RX ADMIN — SODIUM CHLORIDE 1 GRAM(S): 9 INJECTION INTRAMUSCULAR; INTRAVENOUS; SUBCUTANEOUS at 05:18

## 2023-08-06 NOTE — ED ADULT NURSE NOTE - NSFALLUNIVINTERV_ED_ALL_ED
Bed/Stretcher in lowest position, wheels locked, appropriate side rails in place/Call bell, personal items and telephone in reach/Instruct patient to call for assistance before getting out of bed/chair/stretcher/Non-slip footwear applied when patient is off stretcher/[place] to call system/Physically safe environment - no spills, clutter or unnecessary equipment/Purposeful proactive rounding/Room/bathroom lighting operational, light cord in reach

## 2023-08-06 NOTE — ED ADULT NURSE NOTE - PATIENT'S PREFERRED PRONOUN
Medication/Dose: topiramate (TOPAMAX)  Patient last seen by PCP: 02/07/22  Next office visit with PCP: 08/24/22  Last Lab:10/11/21    Prescription does not require PDMP check    Sent to provider to approve or deny      
Refilled.  
Her/She

## 2023-08-06 NOTE — ED ADULT TRIAGE NOTE - CHIEF COMPLAINT QUOTE
Pt c/o cough, SOB and rib pain x1 day. Reports hx Lung Ca last chemo x3 weeks ago. Denies chest pain, fever, chills. O2 sat 88% on RA difficulty completing sentences, placed on 2L NC.

## 2023-08-06 NOTE — ED PROVIDER NOTE - CLINICAL SUMMARY MEDICAL DECISION MAKING FREE TEXT BOX
67 yo female with PMH lung ca on CT (last CT 3 weeks ago), Dm for evaluation of shortness of breath and cough x 1 week, acutely worsened yesterday [place]. Patient states she has worsened exertional dyspnea. Denies fevers, chills, chest pain, nausea, vomiting. +intermittent palpitations. Denies h/o deep venous thrombosis or PE. Not on AC. Former smoker. A/P Labs, imaging, medicate, reassess

## 2023-08-06 NOTE — ED PROVIDER NOTE - PROGRESS NOTE DETAILS
Pt updated regarding results of CT, states she did not know about bony metastasis, pt presented to Dr. Song Kirk, admitted to Dr. Guerrero on tele, neurosurgery consulted for spinal evaluation given concern for t10 bony metastasis on CTA. Pt agreeable to staying. - Valdo Bynum, PGY-3

## 2023-08-06 NOTE — ED ADULT TRIAGE NOTE - GLASGOW COMA SCALE: SCORE, MLM
15
Render Note In Bullet Format When Appropriate: No
Duration Of Freeze Thaw-Cycle (Seconds): 3
Post-Care Instructions: I reviewed with the patient in detail post-care instructions. Patient is to wear sunprotection, and avoid picking at any of the treated lesions. Pt may apply Vaseline to crusted or scabbing areas.
Number Of Freeze-Thaw Cycles: 2 freeze-thaw cycles
Show Aperture Variable?: Yes
Detail Level: Detailed
Consent: The patient's consent was obtained including but not limited to risks of crusting, scabbing, blistering, scarring, darker or lighter pigmentary change, recurrence, incomplete removal and infection.

## 2023-08-06 NOTE — ED PROVIDER NOTE - OBJECTIVE STATEMENT
69 yo female with PMH lung ca on CT (last CT 3 weeks ago), Dm for evaluation of shortness of breath and cough x 1 week, acutely worsened yesterday after [place]. Patient states she has worsened exertional dyspnea. Denies fevers, chills, chest pain, nausea, vomiting. +intermittent palpitations. Denies h/o deep venous thrombosis or PE. Not on AC. Former smoker.

## 2023-08-06 NOTE — ED ADULT NURSE NOTE - OBJECTIVE STATEMENT
Pt received in room 4. Pt alert and oriented x 4, ambulatory at baseline. Hx of lung cancer (last chemo 3 weeks ago). Pt c/o shortness of breath that worsens on exertion, productive cough with green sputum, and pleuritic chest pain rated 6/10 that began yesterday. Per triage note, pt O2 sat 88% on RA, placed on 2L NC, satting 100%. Pt report constipation, last BM 3 days ago. Skin intact, warm and dry to touch. Sinus tachycardia on the monitor. Respirations even and unlabored. Abdomen soft, round, nondistended, nontender. No edema x 4 extremities. Pt denies N/V/D, headache, fever, chills, dizziness, weakness, blurry vision,  symptoms. Awaiting MD orders. Pt received in room 4. Pt alert and oriented x 4, ambulatory at baseline. Hx of DM, lung cancer (last chemo 3 weeks ago). Pt c/o shortness of breath that worsens on exertion, productive cough with green sputum, and pleuritic chest pain rated 6/10 that began yesterday. Per triage note, pt O2 sat 88% on RA, placed on 2L NC, satting 100%. Pt report constipation, last BM 3 days ago. Skin intact, warm and dry to touch. Sinus tachycardia on the monitor. Respirations even and unlabored. Abdomen soft, round, nondistended, nontender. No edema x 4 extremities. Pt denies N/V/D, headache, fever, chills, dizziness, weakness, blurry vision,  symptoms. Awaiting MD orders.

## 2023-08-07 NOTE — ED ADULT NURSE REASSESSMENT NOTE - NS ED NURSE REASSESS COMMENT FT1
Pt laying in bed comfortably, NAD, NSR on the monitor, respirations even and unlabored. VSS in flow sheet. Awaiting CT scan.
Pt laying in bed, respirations even and unlabored. Pt c/o bilateral flank pain rated 7/10, Fletcher LEE made aware, medicated per MD orders.
Report received from ANNA Blake. pt. remains A&Ox4, awake and resting. pt. offers no new complaints at this time. no acute distress noted. respirations even and unlabored. Sinus tach on cardiac monitor. VS as noted. due medications given as per orders.
pt. remains A&Ox4, awake and resting. pt. endorsing generalized rib pain at this time 6/10. PRN orders followed, will reassess. NAD noted at this time. respirations even and unlabored on RA. VS as noted.
Patient resting in stretcher A&Ox4, ambulatory at baseline complaining of right sided flank pain at this time 5/10. ACP MD made aware. Awaiting further orders.  Patient states SOB has improved at rest and is worse when attempting to ambulate. Patient does not appear to be in any active respiratory distress at this time. Remains on 2L NC at this time. O2 saturation 98%. Patient Sinus tachycardic on cardiac monitor. Care plan continued. Comfort measures provided. Safety maintained. Awaiting bed assignment.
pt. remains A&Ox4, awake and resting. pt. offers no new complaints at this time. no acute distress noted. respirations even and unlabored. NSR on cardiac monitor. VS as noted. FS as noted. night time meds given.

## 2023-08-07 NOTE — CONSULT NOTE ADULT - ASSESSMENT
This is a 68 year old female with stage IV large cell neuroendocrine cancer who presents with dyspnea and cough.    1. Stage IV large cell neuroendocrine cancer   -- Involving lungs, bones, and lymph nodes   -- Currently on treatment with pemetrexed and pembrolizumab (LD 07/21)   -- No systemic treatment while admitted  -- [place] up with [place]. [place] [facility] [place]     2. Shortness of breath   -- likely secondary to disease   -- CTA chest w/o PE; extensive pulm mets and mediastinal/hilar lymphadenopathy; T10 lytic lesion; extrinsic compression of RLL bronchus   -- PET/CT from 07/17 with right hilar mass, numerous pulmonary mets, thoracic and mediastinal lymphadenopathy, pleural mets, and osseous mets (L5, left hemisacrum, left anterior 6th rib, right prox humeral shaft, left posterior arch of C1)   -- Recommend pulmonary evaluation   -- Continue supplemental O2 as needed, wean as tolerated     Will continue to follow.    Adelaide Sampson PA-C  Hematology/Oncology  [facility]  [phone] (office)  [phone] (alt office)  Evenings and weekends please call MD on call or office

## 2023-08-07 NOTE — H&P ADULT - ASSESSMENT
69 yo female with PMH lung ca, DM 2 recently diagnosed admitted with evaluation of shortness of breath and cough clinical presentation consistent with worsening disease burden, no pneumonia or PE on CTA

## 2023-08-07 NOTE — H&P ADULT - HISTORY OF PRESENT ILLNESS
67 yo female with PMH lung ca on CT (last CT 3 weeks ago), DM for evaluation of shortness of breath and cough x 1 week, acutely worsened yesterday [place] [facility] [place]. Patient states she has worsened exertional dyspnea. Denies fevers, chills, chest pain, nausea, vomiting. +intermittent palpitations. Denies h/o deep venous thrombosis or PE. Not on AC. Former smoker.

## 2023-08-07 NOTE — H&P ADULT - PATIENT'S PREFERRED PRONOUN
Spoke with dr Donald Wallace re: urine output & pain management. md states cont to monitor urine output, and start norco for pain & cont to use pca for breakthrough pain only. Therapy in room working with pt now.   Pt has poor endurance & easily fatigued Her/She

## 2023-08-07 NOTE — CONSULT NOTE ADULT - SUBJECTIVE AND OBJECTIVE BOX
Reason for consult: lung adenocarcinoma     HPI:  69 yo female with PMH lung ca on CT (last CT 3 weeks ago), DM for evaluation of shortness of breath and cough x 1 week, acutely worsened yesterday after [place]. Patient states she has worsened exertional dyspnea. Denies fevers, chills, chest pain, nausea, vomiting. +intermittent palpitations. Denies h/o deep venous thrombosis or PE. Not on AC. Former smoker. (07 Aug 2023 11:15)    Hematology/Oncology consulted on this 68 year old female who presents with worsening cough and shortness of breath. Patient [place] Fredy [place] [facility] [place] of stage IV large cell neuroendocrine cancer involving lung and nodes. She is on maintenance therapy with pemetrexed and pembrolizumab, last dose 07/21/2023.   Patient seen this afternoon in ED. She reports persistent right-sided rib pain.     PAST MEDICAL & SURGICAL HISTORY:  DM (diabetes mellitus)      Cancer of lung      No significant past surgical history          FAMILY HISTORY:  No pertinent family history in first degree relatives        Alochol: Denied  Smoking: Nonsmoker  Drug Use: Denied  Marital Status:         Allergies    No Known Allergies    Intolerances        MEDICATIONS  (STANDING):  dextrose 5%. 1000 milliLiter(s) (50 mL/Hr) IV Continuous <Continuous>  dextrose 5%. 1000 milliLiter(s) (100 mL/Hr) IV Continuous <Continuous>  dextrose 50% Injectable 25 Gram(s) IV Push once  dextrose 50% Injectable 25 Gram(s) IV Push once  dextrose 50% Injectable 12.5 Gram(s) IV Push once  glucagon  Injectable 1 milliGRAM(s) IntraMuscular once  heparin   Injectable 5000 Unit(s) SubCutaneous every 12 hours  insulin glargine Injectable (LANTUS) 5 Unit(s) SubCutaneous at bedtime  insulin lispro (ADMELOG) corrective regimen sliding scale   SubCutaneous at bedtime  insulin lispro (ADMELOG) corrective regimen sliding scale   SubCutaneous three times a day before meals  polyethylene glycol 3350 17 Gram(s) Oral daily  senna 2 Tablet(s) Oral at bedtime    MEDICATIONS  (PRN):  acetaminophen     Tablet .. 650 milliGRAM(s) Oral every 6 hours PRN Temp greater or equal to 38C (100.4F), Mild Pain (1 - 3)  dextrose Oral Gel 15 Gram(s) Oral once PRN Blood Glucose LESS THAN 70 milliGRAM(s)/deciliter  morphine  - Injectable 2 milliGRAM(s) IV Push every 6 hours PRN Severe Pain (7 - 10)  oxyCODONE    IR 5 milliGRAM(s) Oral every 6 hours PRN Moderate Pain (4 - 6)      ROS  No fever, sweats, chills  No epistaxis, HA, sore throat  +dyspnea, cough   No n/v/d, abd pain, melena, hematochezia  No edema  No rash  No anxiety  +R rib/side pain   No bleeding, bruising  No dysuria, hematuria    T(C): 36.9 (08-07-23 @ 12:23), Max: 37 (08-06-23 @ 20:31)  HR: 105 (08-07-23 @ 12:23) (97 - 114)  BP: 125/97 (08-07-23 @ 12:23) (117/81 - 159/68)  RR: 18 (08-07-23 @ 12:23) (18 - 22)  SpO2: 97% (08-07-23 @ 12:23) (88% - 100%)  Wt(kg): --    PE  NAD  Awake, alert  Anicteric, MMM  Abd soft, NT, ND  No c/c/e  No rash grossly  FROM                          11.4   17.68 )-----------( 660      ( 07 Aug 2023 10:58 )             34.4       08-07    132<L>  |  95<L>  |  8   ----------------------------<  286<H>  4.6   |  24  |  0.50    Ca    9.7      07 Aug 2023 10:58  Phos  3.1     08-07  Mg     2.00     08-07    TPro  8.2  /  Alb  3.6  /  TBili  0.2  /  DBili  x   /  AST  31  /  ALT  13  /  AlkPhos  82  08-06        ACC: [id number] EXAM:  CT ANGIO CHEST PULM ART [facility]   ORDERED BY: ISABEL CHUNG     PROCEDURE DATE:  08/07/2023          INTERPRETATION:  CLINICAL INFORMATION: Chest pain, cough, shortness of   breath , history of lung cancer    COMPARISON: None.    CONTRAST/COMPLICATIONS:  IV Contrast: Omnipaque 350  60 cc administered   40 cc discarded  Oral Contrast: NONE  Complications: None reported at time of study completion    PROCEDURE:  CT Angiography of the Chest.  Sagittal and coronal reformats were performed as well as 3D (MIP)   reconstructions.    FINDINGS:    LUNGS AND AIRWAYS: There is extrinsic compression of the right lower lobe   bronchus with right lower lobe subsegmental atelectasis.  There are in   numerable bilateral pulmonary nodules measuring up to 2.5 x 2 cm in the   medial left lower lobe. There is interlobular septal thickening   suspicious for lymphangitic spread of tumor.  PLEURA: Mild right pleural effusion.  MEDIASTINUM AND DAMASO: There is confluent mediastinal adenopathy measuring   roughly 3.3 x 3.4 cm in the right paratracheal space, 4.6 x 2.7 x 3.7 cm   in the subcarinal space, and 2.6 cm in the right infrahilar space.    VESSELS: Pulmonary arterial opacification is adequate. No pulmonary   embolism. No aortic dissection.  HEART: Heart size is normal. No pericardial effusion.  CHEST WALL AND LOWER NECK: Within normal limits.  VISUALIZED UPPER ABDOMEN: Mild nonspecific left adrenal gland thickening.   BONES: Lytic lesion in the T10 vertebral body suspicious for lytic   metastasis.    IMPRESSION:  No pulmonary embolism.    Extensive bilateral pulmonary metastases and confluent mediastinal/hilar   adenopathy. Likely lymphangitic spread of tumor. Lytic lesion in the T10   vertebral body suspicious for bony metastasis. Nonspecific left adrenal   gland thickening. Extrinsic compression on right lower lobe bronchus with   right lower lobe subsegmental atelectasis.  Findings are concerning for   bronchogenic malignancy.  Consider small cell carcinoma.    --- End of Report ---      ACC: [id number] EXAM:  CT LUMBAR SPINE   ORDERED BY: FLORECITA CROOKS     ACC: [id number] EXAM:  CT CERVICAL SPINE   ORDERED BY: FLORECITA CROOKS     PROCEDURE DATE:  08/07/2023          INTERPRETATION:  Clinical indication: Lung cancer with metastasis.    Cervical spine CT:    Serial thin sections on a multi slice scanner were obtained through the   cervical spine from the C1 to the T2 level in a stacked axial fashion   reformatted at 1.25 mm sections with sagittal and coronal computer   generated reconstructed views.    No prior spine imaging is available for comparison. Comparison is made   with the prior chest CT of 4:59 AM.    The cervical vertebrae are normal in height and density. No acute   fractures or dislocations are identified. There are no lytic or blastic   lesions. Mild degenerative changes are identified with uncovertebral   joint and facet hypertrophy. Calcifications of the  Vascular calcifications are identified. Pleural thickening in the right   lung apex is present. There are multiple nodules identified within the   lung parenchyma. Please refer to the chest CT obtained earlier for other   information.    Lumbar spine CT :    Serial thin sections on a multi slice scanner were obtained through the   lumbosacral spine from the T11-12 to the S5 level in a stacked axial   fashion reformatted at 1.25 mm sections with sagittal and coronal   computer generated reconstructed views.    The visualized thoracic and lumbosacral vertebral bodies are normal in   height and normal in density with the exception of L5. There is a rounded   sclerotic focus in the posterior vertebral body which could represent a   metastasis.    No lytic lesions are identified.    There are no paraspinal masses.    Contrast is identified within the renal collecting systems.      Impression:    Cervical spine CT: No lytic or blastic lesions. Multiple lung nodules.   Please refer to the chest CT for further evaluation.    Lumbar spine CT: Rounded sclerotic focus within the L5 vertebral body may   represent a sclerotic metastasis. No lytic lesions.    --- End of Report ---

## 2023-08-07 NOTE — CONSULT NOTE ADULT - ASSESSMENT
67yo M with PMHx of lung cancer presents to ED for worsening dyspnea for one week with a CT chest showed a lytic lesion on T10 suspicious for bony metastasis. 67yo F with PMHx of lung cancer presents to ED for worsening dyspnea for one week with a CT chest showed a lytic lesion on T10 suspicious for bony metastasis. 67yo F with PMHx of lung cancer presents to ED for worsening dyspnea for one week with a CT chest showed a lytic lesion on T10 suspicious for bony metastasis. Patient is POST x4 with good strength and no spinal tenderness.

## 2023-08-07 NOTE — H&P ADULT - NSHPREVIEWOFSYSTEMS_GEN_ALL_CORE
Gen: + loss of wt + loss of appetite  Ophth: no blurring of vision no loss of vision  Resp: No cough no sputum production  CVS: No chest pain no palpitations no orthopnea  GI: no nausea, vomiting or diarrhea   : no dysuria, hematuria  Endo: no polyuria no excessive sweating  Neuro: no weakness no paresthesias

## 2023-08-07 NOTE — H&P ADULT - PROBLEM SELECTOR PLAN 1
with hypoxic respiratory failure   likely secondary to extensive disease burden and extrinsic tumor compression of right bronchus  continue O2 as needed  oncology consultation requested

## 2023-08-07 NOTE — CONSULT NOTE ADULT - PROBLEM SELECTOR RECOMMENDATION 9
- Medicine and oncology consults  - CT non-contrast cervical and lumbar spine    p  Case to be discussed with attending Dr. Nino - Medicine and oncology consults  - CT non-contrast cervical and lumbar spine- completed, C-spine negative, ? L5 lesion  Case to be discussed with attending Dr. Nino

## 2023-08-07 NOTE — CONSULT NOTE ADULT - SUBJECTIVE AND OBJECTIVE BOX
HPI: 67yo M, former smoker, with PMHx of newly dx Lung Carcinoma presenting to ED for SOB and cough for one week which worsened after walking a lot yesterday. Patient is not on any anticoagulation. CT chest shows a T10 lytic lesion suspicious for bony metastasis. No PE seen on chest CT.     PAST MEDICAL HISTORY: Lung Carcinoma    Allergies  No Known Allergies      Vital Signs Last 24 Hrs  T(C): 36.8 (07 Aug 2023 05:50), Max: 37 (06 Aug 2023 20:31)  T(F): 98.3 (07 Aug 2023 05:50), Max: 98.6 (06 Aug 2023 20:31)  HR: 111 (07 Aug 2023 05:50) (97 - 114)  BP: 134/86 (07 Aug 2023 05:50) (125/64 - 159/68)  BP(mean): --  RR: 18 (07 Aug 2023 05:50) (18 - 19)  SpO2: 100% (07 Aug 2023 05:50) (88% - 100%)    Parameters below as of 07 Aug 2023 05:50  Patient On (Oxygen Delivery Method): nasal cannula  O2 Flow (L/min): 2      PHYSICAL EXAM:      LABS:                          11.2   16.82 )-----------( 623      ( 06 Aug 2023 22:18 )             34.5     08-06    132<L>  |  95<L>  |  10  ----------------------------<  281<H>  4.5   |  25  |  0.56    Ca    9.7      06 Aug 2023 22:18  Phos  2.6     08-06  Mg     2.10     08-06    TPro  8.2  /  Alb  3.6  /  TBili  0.2  /  DBili  x   /  AST  31  /  ALT  13  /  AlkPhos  82  08-06    PT/INR - ( 06 Aug 2023 22:18 )   PT: 13.0 sec;   INR: 1.17 ratio         PTT - ( 06 Aug 2023 22:18 )  PTT:30.3 sec  Urinalysis Basic - ( 06 Aug 2023 22:18 )    Color: x / Appearance: x / SG: x / pH: x  Gluc: 281 mg/dL / Ketone: x  / Bili: x / Urobili: x   Blood: x / Protein: x / Nitrite: x   Leuk Esterase: x / RBC: x / WBC x   Sq Epi: x / Non Sq Epi: x / Bacteria: x        RADIOLOGY & ADDITIONAL STUDIES:  < from: CT Angio Chest PE Protocol w/ IV Cont (08.07.23 @ 05:01) >  IMPRESSION:  No pulmonary embolism.    Extensive bilateral pulmonary metastases and confluent mediastinal/hilar   adenopathy. Likely lymphangitic spread of tumor. Lytic lesion in the T10   vertebral body suspicious for bony metastasis. Nonspecific left adrenal   gland thickening. Extrinsic compression on right lower lobe bronchus with   right lower lobe subsegmental atelectasis.  Findings are concerning for   bronchogenic malignancy.  Consider small cell carcinoma.     HPI: 69yo F, former smoker, with PMHx of newly dx Lung Carcinoma presenting to ED for SOB and cough for one week which worsened after walking a lot yesterday. Patient is not on any anticoagulation. CT chest shows a T10 lytic lesion suspicious for bony metastasis. No PE seen on chest CT.     PAST MEDICAL HISTORY: Lung Carcinoma    Allergies  No Known Allergies      Vital Signs Last 24 Hrs  T(C): 36.8 (07 Aug 2023 05:50), Max: 37 (06 Aug 2023 20:31)  T(F): 98.3 (07 Aug 2023 05:50), Max: 98.6 (06 Aug 2023 20:31)  HR: 111 (07 Aug 2023 05:50) (97 - 114)  BP: 134/86 (07 Aug 2023 05:50) (125/64 - 159/68)  BP(mean): --  RR: 18 (07 Aug 2023 05:50) (18 - 19)  SpO2: 100% (07 Aug 2023 05:50) (88% - 100%)    Parameters below as of 07 Aug 2023 05:50  Patient On (Oxygen Delivery Method): nasal cannula  O2 Flow (L/min): 2      PHYSICAL EXAM:      LABS:                          11.2   16.82 )-----------( 623      ( 06 Aug 2023 22:18 )             34.5     08-06    132<L>  |  95<L>  |  10  ----------------------------<  281<H>  4.5   |  25  |  0.56    Ca    9.7      06 Aug 2023 22:18  Phos  2.6     08-06  Mg     2.10     08-06    TPro  8.2  /  Alb  3.6  /  TBili  0.2  /  DBili  x   /  AST  31  /  ALT  13  /  AlkPhos  82  08-06    PT/INR - ( 06 Aug 2023 22:18 )   PT: 13.0 sec;   INR: 1.17 ratio         PTT - ( 06 Aug 2023 22:18 )  PTT:30.3 sec  Urinalysis Basic - ( 06 Aug 2023 22:18 )    Color: x / Appearance: x / SG: x / pH: x  Gluc: 281 mg/dL / Ketone: x  / Bili: x / Urobili: x   Blood: x / Protein: x / Nitrite: x   Leuk Esterase: x / RBC: x / WBC x   Sq Epi: x / Non Sq Epi: x / Bacteria: x        RADIOLOGY & ADDITIONAL STUDIES:  < from: CT Angio Chest PE Protocol w/ IV Cont (08.07.23 @ 05:01) >  IMPRESSION:  No pulmonary embolism.    Extensive bilateral pulmonary metastases and confluent mediastinal/hilar   adenopathy. Likely lymphangitic spread of tumor. Lytic lesion in the T10   vertebral body suspicious for bony metastasis. Nonspecific left adrenal   gland thickening. Extrinsic compression on right lower lobe bronchus with   right lower lobe subsegmental atelectasis.  Findings are concerning for   bronchogenic malignancy.  Consider small cell carcinoma.     HPI: 67yo F, former smoker, with PMHx of Lung Cancer dx in February 2023 via biopsy, undergoing chemotherapy at [facility], presenting to ED for SOB and cough for one week which worsened after walking a lot yesterday. Patient is not on any anticoagulation. CT chest shows a T10 lytic lesion suspicious for bony metastasis. No PE seen on chest CT. Denies paresthesias, numbness, loss of bladder/bowel control, spine tenderness.    PAST MEDICAL HISTORY: Lung Carcinoma (2023)    Allergies  No Known Allergies      Vital Signs Last 24 Hrs  T(C): 36.8 (07 Aug 2023 05:50), Max: 37 (06 Aug 2023 20:31)  T(F): 98.3 (07 Aug 2023 05:50), Max: 98.6 (06 Aug 2023 20:31)  HR: 111 (07 Aug 2023 05:50) (97 - 114)  BP: 134/86 (07 Aug 2023 05:50) (125/64 - 159/68)  BP(mean): --  RR: 18 (07 Aug 2023 05:50) (18 - 19)  SpO2: 100% (07 Aug 2023 05:50) (88% - 100%)    Parameters below as of 07 Aug 2023 05:50  Patient On (Oxygen Delivery Method): nasal cannula  O2 Flow (L/min): 2      PHYSICAL EXAM:  A&Ox3. Follows commands.  EOMI. PERRL.  [X] Upper extremity                      Delt       Bicep    Tricep                                                  R         5/5        5/5        5/5       5/5                                               L          5/5        5/5        5/5       5/5  [X] Lower extremity                       HF          KE          KF        DF         PF                                               R        5/5        5/5        5/5       5/5       5/5                                               L         5/5        5/5       5/5       5/5        5/5  SILT  No lyons's sign. No clonus.      LABS:                          11.2   16.82 )-----------( 623      ( 06 Aug 2023 22:18 )             34.5     08-06    132<L>  |  95<L>  |  10  ----------------------------<  281<H>  4.5   |  25  |  0.56    Ca    9.7      06 Aug 2023 22:18  Phos  2.6     08-06  Mg     2.10     08-06    TPro  8.2  /  Alb  3.6  /  TBili  0.2  /  DBili  x   /  AST  31  /  ALT  13  /  AlkPhos  82  08-06    PT/INR - ( 06 Aug 2023 22:18 )   PT: 13.0 sec;   INR: 1.17 ratio         PTT - ( 06 Aug 2023 22:18 )  PTT:30.3 sec  Urinalysis Basic - ( 06 Aug 2023 22:18 )    Color: x / Appearance: x / SG: x / pH: x  Gluc: 281 mg/dL / Ketone: x  / Bili: x / Urobili: x   Blood: x / Protein: x / Nitrite: x   Leuk Esterase: x / RBC: x / WBC x   Sq Epi: x / Non Sq Epi: x / Bacteria: x        RADIOLOGY & ADDITIONAL STUDIES:  < from: CT Angio Chest PE Protocol w/ IV Cont (08.07.23 @ 05:01) >  IMPRESSION:  No pulmonary embolism.    Extensive bilateral pulmonary metastases and confluent mediastinal/hilar   adenopathy. Likely lymphangitic spread of tumor. Lytic lesion in the T10   vertebral body suspicious for bony metastasis. Nonspecific left adrenal   gland thickening. Extrinsic compression on right lower lobe bronchus with   right lower lobe subsegmental atelectasis.  Findings are concerning for   bronchogenic malignancy.  Consider small cell carcinoma.

## 2023-08-07 NOTE — H&P ADULT - NSHPPHYSICALEXAM_GEN_ALL_CORE
PHYSICAL EXAM: vital signs noted on [facility]  in mild respiratory distress  HEENT: EDA EOMI  Neck: Supple, no JVD, no thyromegaly  Lungs: decreased breath sounds bases   CVS: S1 S2 no M/R/G  Abdomen: no tenderness, no organomegaly, BS present  Neuro: AO x 3 no focal weakness, no sensory abnormalities  power equal and maintained all 4 extremities   Skin: warm, dry  Ext: no cyanosis or clubbing, no edema  Msk: no joint swelling or deformities  Back: no CVA tenderness, no kyphosis/scoliosis

## 2023-08-08 NOTE — PATIENT PROFILE ADULT - NSPROGENSOURCEINFO_GEN_A_NUR
Palliative Care Progress Note    Reason for Palliative Care: Psychosocial Support, Patient/Family and Care Coordination     Assessment      Following patient from Palliative Care. Planned call with MD to patient's sister Regina did not take place, as MD was unavailable. Spoke to sister by phone to inform her. She stated she had spoke to Unit RN earlier regarding a procedure and was wondering how this went. Discussed with RN, who will contact sister to provide update.     Sister is looking for guidance in decision making. It appears she is not comfortable withdrawing or declining any treatments that are offered. On-going support will be provided to sister, in coordination with Medical Team.     Plan      Update will be provided to Palliative APN. Palliative Care will continue to follow.     Advance Care Planning             Thank you for involving Palliative and Supportive Care. Please contact the covering provider via [employer] with further questions or concerns.  Regina Null, SHELL        Metrics          LVAD: No  #1 Post-Visit: No  Reason if No #1 on Chart Prior to Discharge from Palliative Care: HEALTHCARE SURROGATE  #2 Post-Visit: Yes  #3 Post-Visit: No                          patient

## 2023-08-08 NOTE — PATIENT PROFILE ADULT - FALL HARM RISK - HARM RISK INTERVENTIONS

## 2023-08-08 NOTE — PROGRESS NOTE ADULT - ASSESSMENT
This is a 68 year old female with stage IV large cell neuroendocrine cancer who presents with dyspnea and cough.    1. Stage IV large cell neuroendocrine cancer   -- Involving lungs, bones, and lymph nodes   -- Currently on treatment with pemetrexed and pembrolizumab (LD 07/21)   -- No systemic treatment while admitted  -- Follow up with Dr. Rodriguez at [facility] after discharge     2. Shortness of breath   -- Secondary to disease burden vs rib pain (pt states when she gets rib pain it affects her breathing, otherwise no shortness of breath)   -- CTA chest w/o PE; extensive pulm mets and mediastinal/hilar lymphadenopathy; T10 lytic lesion; extrinsic compression of RLL bronchus   -- PET/CT from 07/17 with right hilar mass, numerous pulmonary mets, thoracic and mediastinal lymphadenopathy, pleural mets, and osseous mets (L5, left hemisacrum, left anterior 6th rib, right prox humeral shaft, left posterior arch of C1)   -- Continue supplemental O2 as needed, wean as tolerated     3. T10 lytic lesion   -- Neurosurgery consulted, f/u MRI thoracic + lumbar spine and bone scan     Will continue to follow.    Adelaide Sampson PA-C  Hematology/Oncology  [facility]  [phone] (office)  [phone] (alt office)  Evenings and weekends please call MD on call or office

## 2023-08-08 NOTE — PROGRESS NOTE ADULT - SUBJECTIVE AND OBJECTIVE BOX
Patient is a 68y old  Female who presents with a chief complaint of shortness of breath (07 Aug 2023 15:30)      DATE OF SERVICE: 08-08-23 @ 10:06    SUBJECTIVE / OVERNIGHT EVENTS: overnight events noted    ROS:  Resp: No cough no sputum production  CVS: No chest pain no palpitations no orthopnea  GI: no N/V/D  : no dysuria, no hematuria  "I feel better"         MEDICATIONS  (STANDING):  dextrose 5%. 1000 milliLiter(s) (50 mL/Hr) IV Continuous <Continuous>  dextrose 5%. 1000 milliLiter(s) (100 mL/Hr) IV Continuous <Continuous>  dextrose 50% Injectable 25 Gram(s) IV Push once  dextrose 50% Injectable 25 Gram(s) IV Push once  dextrose 50% Injectable 12.5 Gram(s) IV Push once  glucagon  Injectable 1 milliGRAM(s) IntraMuscular once  heparin   Injectable 5000 Unit(s) SubCutaneous every 12 hours  insulin glargine Injectable (LANTUS) 5 Unit(s) SubCutaneous at bedtime  insulin lispro (ADMELOG) corrective regimen sliding scale   SubCutaneous at bedtime  insulin lispro (ADMELOG) corrective regimen sliding scale   SubCutaneous three times a day before meals  polyethylene glycol 3350 17 Gram(s) Oral daily  senna 2 Tablet(s) Oral at bedtime    MEDICATIONS  (PRN):  acetaminophen     Tablet .. 650 milliGRAM(s) Oral every 6 hours PRN Temp greater or equal to 38C (100.4F), Mild Pain (1 - 3)  dextrose Oral Gel 15 Gram(s) Oral once PRN Blood Glucose LESS THAN 70 milliGRAM(s)/deciliter  morphine  - Injectable 2 milliGRAM(s) IV Push every 6 hours PRN Severe Pain (7 - 10)  oxyCODONE    IR 5 milliGRAM(s) Oral every 6 hours PRN Moderate Pain (4 - 6)        CAPILLARY BLOOD GLUCOSE      POCT Blood Glucose.: 215 mg/dL (08 Aug 2023 09:23)  POCT Blood Glucose.: 196 mg/dL (07 Aug 2023 22:03)  POCT Blood Glucose.: 195 mg/dL (07 Aug 2023 17:10)  POCT Blood Glucose.: 267 mg/dL (07 Aug 2023 10:55)    I&O's Summary      Vital Signs Last 24 Hrs  T(C): 36.8 (08 Aug 2023 09:30), Max: 36.9 (07 Aug 2023 12:23)  T(F): 98.2 (08 Aug 2023 09:30), Max: 98.5 (07 Aug 2023 12:23)  HR: 108 (08 Aug 2023 09:30) (97 - 112)  BP: 105/59 (08 Aug 2023 09:30) (105/59 - 142/62)  BP(mean): --  RR: 16 (08 Aug 2023 09:30) (16 - 18)  SpO2: 96% (08 Aug 2023 09:30) (96% - 100%)    PHYSICAL EXAM:  GENERAL: in no distress  EYES: EOMI, PERRLA,  NECK: Supple, No JVD  CHEST/LUNG: clear   HEART: S1 S2; no murmurs  ABDOMEN: Soft, Nontender  EXTREMITIES:    no edema  NEUROLOGY: AO x 3 non-focal  no weakness     LABS:                        12.2   15.06 )-----------( 665      ( 08 Aug 2023 05:15 )             37.7     08-08    131<L>  |  94<L>  |  11  ----------------------------<  204<H>  4.5   |  25  |  0.50    Ca    10.0      08 Aug 2023 05:15  Phos  2.7     08-08  Mg     2.00     08-08    TPro  8.3  /  Alb  3.7  /  TBili  0.4  /  DBili  x   /  AST  26  /  ALT  14  /  AlkPhos  79  08-08    PT/INR - ( 06 Aug 2023 22:18 )   PT: 13.0 sec;   INR: 1.17 ratio         PTT - ( 06 Aug 2023 22:18 )  PTT:30.3 sec      Urinalysis Basic - ( 08 Aug 2023 05:15 )    Color: x / Appearance: x / SG: x / pH: x  Gluc: 204 mg/dL / Ketone: x  / Bili: x / Urobili: x   Blood: x / Protein: x / Nitrite: x   Leuk Esterase: x / RBC: x / WBC x   Sq Epi: x / Non Sq Epi: x / Bacteria: x          All consultant(s) notes reviewed and care discussed with other providers        Contact Number, Dr Kirk [phone]

## 2023-08-09 NOTE — PROGRESS NOTE ADULT - PROBLEM SELECTOR PLAN 2
likely metastatic   CT Lumbar and cervical spine   cervical spine appears clean  lumbar spine ? lesion  MR T/L spine w and w/o contrast ordered  NS help appreciated  likely needs TLSO brace
likely metastatic   CT Lumbar and cervical spine noted  bone scan positive for lesion L5 mid thoracic and right femur shaft   ortho oncology consultation devaughn by me  X ray right femur  spine ortho follow up   awaiting MRI

## 2023-08-09 NOTE — PROGRESS NOTE ADULT - SUBJECTIVE AND OBJECTIVE BOX
Patient is a 68y old  Female who presents with a chief complaint of shortness of breath (08 Aug 2023 15:47)      DATE OF SERVICE: 08-09-23 @ 10:14    SUBJECTIVE / OVERNIGHT EVENTS: overnight events noted    ROS:  Resp: No cough no sputum production  CVS: No chest pain no palpitations no orthopnea  GI: no N/V/D  : no dysuria, no hematuria  "I feel fine'         MEDICATIONS  (STANDING):  dextrose 5%. 1000 milliLiter(s) (50 mL/Hr) IV Continuous <Continuous>  dextrose 5%. 1000 milliLiter(s) (100 mL/Hr) IV Continuous <Continuous>  dextrose 50% Injectable 25 Gram(s) IV Push once  dextrose 50% Injectable 25 Gram(s) IV Push once  dextrose 50% Injectable 12.5 Gram(s) IV Push once  glucagon  Injectable 1 milliGRAM(s) IntraMuscular once  heparin   Injectable 5000 Unit(s) SubCutaneous every 12 hours  insulin glargine Injectable (LANTUS) 8 Unit(s) SubCutaneous at bedtime  insulin lispro (ADMELOG) corrective regimen sliding scale   SubCutaneous at bedtime  insulin lispro (ADMELOG) corrective regimen sliding scale   SubCutaneous three times a day before meals  polyethylene glycol 3350 17 Gram(s) Oral daily  senna 2 Tablet(s) Oral at bedtime    MEDICATIONS  (PRN):  acetaminophen     Tablet .. 650 milliGRAM(s) Oral every 6 hours PRN Temp greater or equal to 38C (100.4F), Mild Pain (1 - 3)  dextrose Oral Gel 15 Gram(s) Oral once PRN Blood Glucose LESS THAN 70 milliGRAM(s)/deciliter  morphine  - Injectable 2 milliGRAM(s) IV Push every 6 hours PRN Severe Pain (7 - 10)  oxyCODONE    IR 5 milliGRAM(s) Oral every 6 hours PRN Moderate Pain (4 - 6)        CAPILLARY BLOOD GLUCOSE      POCT Blood Glucose.: 178 mg/dL (09 Aug 2023 09:01)  POCT Blood Glucose.: 181 mg/dL (08 Aug 2023 21:34)  POCT Blood Glucose.: 178 mg/dL (08 Aug 2023 17:20)  POCT Blood Glucose.: 273 mg/dL (08 Aug 2023 13:09)    I&O's Summary      Vital Signs Last 24 Hrs  T(C): 36.8 (09 Aug 2023 05:45), Max: 37.1 (08 Aug 2023 13:29)  T(F): 98.2 (09 Aug 2023 05:45), Max: 98.7 (08 Aug 2023 13:29)  HR: 102 (09 Aug 2023 05:45) (83 - 107)  BP: 128/67 (09 Aug 2023 05:45) (96/46 - 128/67)  BP(mean): --  RR: 20 (09 Aug 2023 05:45) (19 - 21)  SpO2: 100% (09 Aug 2023 05:45) (100% - 100%)    PHYSICAL EXAM:  GENERAL: in no distress  EYES: EOMI, PERRLA,  NECK: Supple, No JVD  CHEST/LUNG: clear   HEART: S1 S2; no murmurs  ABDOMEN: Soft, Nontender  EXTREMITIES:    no edema  NEUROLOGY: AO x 3 non-focal  no weakness     LABS:                        11.0   14.84 )-----------( 696      ( 09 Aug 2023 06:20 )             34.3     08-09    133<L>  |  95<L>  |  10  ----------------------------<  178<H>  4.5   |  27  |  0.60    Ca    9.8      09 Aug 2023 06:20  Phos  2.9     08-09  Mg     2.10     08-09    TPro  8.3  /  Alb  3.7  /  TBili  0.4  /  DBili  x   /  AST  26  /  ALT  14  /  AlkPhos  79  08-08          Urinalysis Basic - ( 09 Aug 2023 06:20 )    Color: x / Appearance: x / SG: x / pH: x  Gluc: 178 mg/dL / Ketone: x  / Bili: x / Urobili: x   Blood: x / Protein: x / Nitrite: x   Leuk Esterase: x / RBC: x / WBC x   Sq Epi: x / Non Sq Epi: x / Bacteria: x          All consultant(s) notes reviewed and care discussed with other providers        Contact Number, Dr Kirk [phone]

## 2023-08-09 NOTE — PROGRESS NOTE ADULT - SUBJECTIVE AND OBJECTIVE BOX
Patient is a 68y old  Female who presents with a chief complaint of shortness of breath (09 Aug 2023 13:44)    Patient seen this afternoon. She feels better today, states that her rib pain is better controlled.     MEDICATIONS  (STANDING):  dextrose 5%. 1000 milliLiter(s) (50 mL/Hr) IV Continuous <Continuous>  dextrose 5%. 1000 milliLiter(s) (100 mL/Hr) IV Continuous <Continuous>  dextrose 50% Injectable 25 Gram(s) IV Push once  dextrose 50% Injectable 25 Gram(s) IV Push once  dextrose 50% Injectable 12.5 Gram(s) IV Push once  glucagon  Injectable 1 milliGRAM(s) IntraMuscular once  heparin   Injectable 5000 Unit(s) SubCutaneous every 12 hours  insulin glargine Injectable (LANTUS) 8 Unit(s) SubCutaneous at bedtime  insulin lispro (ADMELOG) corrective regimen sliding scale   SubCutaneous at bedtime  insulin lispro (ADMELOG) corrective regimen sliding scale   SubCutaneous three times a day before meals  polyethylene glycol 3350 17 Gram(s) Oral daily  senna 2 Tablet(s) Oral at bedtime    MEDICATIONS  (PRN):  acetaminophen     Tablet .. 650 milliGRAM(s) Oral every 6 hours PRN Temp greater or equal to 38C (100.4F), Mild Pain (1 - 3)  dextrose Oral Gel 15 Gram(s) Oral once PRN Blood Glucose LESS THAN 70 milliGRAM(s)/deciliter  morphine  - Injectable 2 milliGRAM(s) IV Push every 6 hours PRN Severe Pain (7 - 10)  oxyCODONE    IR 5 milliGRAM(s) Oral every 6 hours PRN Moderate Pain (4 - 6)        Vital Signs Last 24 Hrs  T(C): 36.6 (09 Aug 2023 12:28), Max: 36.8 (08 Aug 2023 18:11)  T(F): 97.8 (09 Aug 2023 12:28), Max: 98.3 (08 Aug 2023 18:11)  HR: 104 (09 Aug 2023 12:28) (83 - 104)  BP: 124/62 (09 Aug 2023 12:28) (96/46 - 128/67)  BP(mean): --  RR: 16 (09 Aug 2023 12:28) (16 - 21)  SpO2: 100% (09 Aug 2023 12:28) (100% - 100%)    Parameters below as of 09 Aug 2023 12:28  Patient On (Oxygen Delivery Method): nasal cannula  O2 Flow (L/min): 2      PE  NAD  Awake, alert  Anicteric, MMM  +nasal cannula   No c/c/e  No rash grossly  FROM                          11.0   14.84 )-----------( 696      ( 09 Aug 2023 06:20 )             34.3       08-09    133<L>  |  95<L>  |  10  ----------------------------<  178<H>  4.5   |  27  |  0.60    Ca    9.8      09 Aug 2023 06:20  Phos  2.9     08-09  Mg     2.10     08-09    TPro  8.3  /  Alb  3.7  /  TBili  0.4  /  DBili  x   /  AST  26  /  ALT  14  /  AlkPhos  79  08-08      ACC: [id number] EXAM:  NM BONE SPECT SA SD   ORDERED BY: FLORECITA CROOKS     ACC: [id number] EXAM:  NM BONE IMG WHOLE BODY   ORDERED BY: FLORECITA CROOKS     PROCEDURE DATE:  08/08/2023          INTERPRETATION:  RADIOPHARMACEUTICAL: 20.1 mCi Tc-99m HDP, I.V.    CLINICAL INFORMATION: 68 year old female with lung cancer; referred for   evaluation of osseous metastases.    TECHNIQUE:  Anterior and posterior whole body images were obtained   approximately 2 hours following radiopharmaceutical administration.   Static images of the thorax and lower extremities from the knees to the   feet and SPECT of the lumbar spine/pelvis also were obtained.    COMPARISON: No previous bone scans were available for comparison.    FINDINGS: There are foci of abnormal radiopharmaceutical accumulation in   a left mid rib anteriorly, two lower right ribs anteriorly, the mid   thoracic spine, and L5. Increased radiopharmaceutical uptake in the mid   shaft of the right femur is questioned. A small focus of increased   radiopharmaceutical accumulation anterior to the right iliac bone is   secondary to external contamination.    Both kidneys are visualized.    IMPRESSION: Abnormal bone scan suspicious for osseous metastases in the   mid thoracic spine, L5 and possibly midshaft of the right femur.    Metastatic disease versus posttraumatic changes in the left mid and right   lower ribs.    --- End of Report ---

## 2023-08-09 NOTE — PROGRESS NOTE ADULT - PROBLEM SELECTOR PLAN 4
finger sticks with short acting insulin sliding scale  will continue Lantus  8 units
finger sticks with short acting insulin sliding scale  will increase Lantus to 8 units

## 2023-08-09 NOTE — CONSULT NOTE ADULT - SUBJECTIVE AND OBJECTIVE BOX
Orthopaedic Surgery Consult Note        HPI:  67 yo female with PMH lung ca, DM for evaluation of shortness of breath and cough x 1 week. Patient states she has worsened exertional dyspnea. Denies fevers, chills, chest pain, nausea, vomiting. +intermittent palpitations. Denies h/o deep venous thrombosis or PE. Not on AC. Former smoker. Dr Patrick consulted to evaluate possible lesion in right femur per bone scan.  Pt denies lower extremity pain, has no difficulty with ambulation.      PAST MEDICAL & SURGICAL HISTORY:  DM (diabetes mellitus)      Cancer of lung      No significant past surgical history        [] No significant past history as reviewed with the patient and family    MEDICATIONS  (STANDING):  dextrose 5%. 1000 milliLiter(s) (50 mL/Hr) IV Continuous <Continuous>  dextrose 5%. 1000 milliLiter(s) (100 mL/Hr) IV Continuous <Continuous>  dextrose 50% Injectable 12.5 Gram(s) IV Push once  dextrose 50% Injectable 25 Gram(s) IV Push once  dextrose 50% Injectable 25 Gram(s) IV Push once  glucagon  Injectable 1 milliGRAM(s) IntraMuscular once  heparin   Injectable 5000 Unit(s) SubCutaneous every 12 hours  insulin glargine Injectable (LANTUS) 8 Unit(s) SubCutaneous at bedtime  insulin lispro (ADMELOG) corrective regimen sliding scale   SubCutaneous at bedtime  insulin lispro (ADMELOG) corrective regimen sliding scale   SubCutaneous three times a day before meals  polyethylene glycol 3350 17 Gram(s) Oral daily  senna 2 Tablet(s) Oral at bedtime    MEDICATIONS  (PRN):  acetaminophen     Tablet .. 650 milliGRAM(s) Oral every 6 hours PRN Temp greater or equal to 38C (100.4F), Mild Pain (1 - 3)  dextrose Oral Gel 15 Gram(s) Oral once PRN Blood Glucose LESS THAN 70 milliGRAM(s)/deciliter  morphine  - Injectable 2 milliGRAM(s) IV Push every 6 hours PRN Severe Pain (7 - 10)  oxyCODONE    IR 5 milliGRAM(s) Oral every 6 hours PRN Moderate Pain (4 - 6)    Allergies    No Known Allergies    Intolerances        Vital Signs Last 24 Hrs  T(C): 36.6 (09 Aug 2023 12:28), Max: 36.8 (08 Aug 2023 18:11)  T(F): 97.8 (09 Aug 2023 12:28), Max: 98.3 (08 Aug 2023 18:11)  HR: 104 (09 Aug 2023 12:28) (83 - 104)  BP: 124/62 (09 Aug 2023 12:28) (96/46 - 128/67)  BP(mean): --  RR: 16 (09 Aug 2023 12:28) (16 - 21)  SpO2: 100% (09 Aug 2023 12:28) (100% - 100%)    Parameters below as of 09 Aug 2023 12:28  Patient On (Oxygen Delivery Method): nasal cannula  O2 Flow (L/min): 2        PHYSICAL EXAM:  NAD                            11.0   14.84 )-----------( 696      ( 09 Aug 2023 06:20 )             34.3     08-09    133<L>  |  95<L>  |  10  ----------------------------<  178<H>  4.5   |  27  |  0.60    Ca    9.8      09 Aug 2023 06:20  Phos  2.9     08-09  Mg     2.10     08-09    TPro  8.3  /  Alb  3.7  /  TBili  0.4  /  DBili  x   /  AST  26  /  ALT  14  /  AlkPhos  79  08-08      Urinalysis Basic - ( 09 Aug 2023 06:20 )    Color: x / Appearance: x / SG: x / pH: x  Gluc: 178 mg/dL / Ketone: x  / Bili: x / Urobili: x   Blood: x / Protein: x / Nitrite: x   Leuk Esterase: x / RBC: x / WBC x   Sq Epi: x / Non Sq Epi: x / Bacteria: x        IMAGING STUDIES:    < from: NM SPECT Bone Scan, Single Area Single Day (08.08.23 @ 16:53) >  ACC: [id number] EXAM:  NM BONE SPECT SA SD   ORDERED BY: FLORECITA CROOKS     ACC: [id number] EXAM:  NM BONE IMG WHOLE BODY   ORDERED BY: FLORECITA CROOKS     PROCEDURE DATE:  08/08/2023          INTERPRETATION:  RADIOPHARMACEUTICAL: 20.1 mCi Tc-99m HDP, I.V.    CLINICAL INFORMATION: 68 year old female with lung cancer; referred for   evaluation of osseous metastases.    TECHNIQUE:  Anterior and posterior whole body images were obtained   approximately 2 hours following radiopharmaceutical administration.   Static images of the thorax and lower extremities from the knees to the   feet and SPECT of the lumbar spine/pelvis also were obtained.    COMPARISON: No previous bone scans were available for comparison.    FINDINGS: There are foci of abnormal radiopharmaceutical accumulation in   a left mid rib anteriorly, two lower right ribs anteriorly, the mid   thoracic spine, and L5. Increased radiopharmaceutical uptake in the mid   shaft of the right femur is questioned. A small focus of increased   radiopharmaceutical accumulation anterior to the right iliac bone is   secondary to external contamination.    Both kidneys are visualized.    IMPRESSION: Abnormal bone scan suspicious for osseous metastases in the   mid thoracic spine, L5 and possibly midshaft of the right femur.    Metastatic disease versus posttraumatic changes in the left mid and right   lower ribs.    --- End of Report ---            LIANE JIMENEZ MD; Attending Nuclear Medicine  This document has been electronically signed. Aug  8 2023  5:01PM    < end of copied text >    XRAY right Femur obtained, personally read by resident, radiodense lesion in mid- distal femur, possible enchondroma.  Await official read      ASSESSMENT  68y Female with metastatic lung Ca, right femur lesion not concerning for mets     PLAN  - No surgical intervention at this time   - WBAT   - Pain control  - Remainder of care per primary team  -Discussed with attending orthopaedic oncologist, Dr. Patrick. We will advise if any changes to plan .

## 2023-08-09 NOTE — PROGRESS NOTE ADULT - ASSESSMENT
This is a 68 year old female with stage IV large cell neuroendocrine cancer who presents with dyspnea and cough.    1. Stage IV large cell neuroendocrine cancer   -- Involving lungs, bones, and lymph nodes   -- Currently on treatment with pemetrexed and pembrolizumab (LD 07/21)   -- No systemic treatment while admitted  -- Follow up with Dr. Rodriguez at [facility] [place] after discharge     2. Shortness of breath   -- Secondary to disease burden vs rib pain (pt states when she gets rib pain it affects her breathing, otherwise no shortness of breath)   -- CTA chest w/o PE; extensive pulm mets and mediastinal/hilar lymphadenopathy; T10 lytic lesion; extrinsic compression of RLL bronchus   -- Recommend pulmonary evaluation for RLL bronchus compression, for consideration of bronchoscopy   -- PET/CT from 07/17 with right hilar mass, numerous pulmonary mets, thoracic and mediastinal lymphadenopathy, pleural mets, and osseous mets (L5, left hemisacrum, left anterior 6th rib, right prox humeral shaft, left posterior arch of C1)   -- Continue supplemental O2 as needed, wean as tolerated     3. Osseous mets   -- Neurosurgery consulted for T10 lytic lesion, f/u MRI thoracic + lumbar spine  -- Bone scan with findings suspicious for osseous mets involving mid thoracic spine, L5, midshaft of R femur; metastatic disease vs post-traumatic changes in L mid and R lower ribs   -- Ortho consulted, R femur lesion not concerning for mets  -- Continue pain control     Will continue to follow.    Adelaide Sampson PA-C  Hematology/Oncology  [facility]  [phone] (office)  [phone] (alt office)  Evenings and weekends please call MD on call or office

## 2023-08-09 NOTE — PROGRESS NOTE ADULT - PROBLEM SELECTOR PLAN 3
likely malignancy   no evidence infection  will continue to monitor
likely malignancy   no evidence infection  will continue to monitor

## 2023-08-10 NOTE — CONSULT NOTE ADULT - ASSESSMENT
68 year old female with stage IV large cell neuroendocrine cancer on chemotherapy, presents to the ER with dyspnea and cough, found to have extrinsic compression of RLL and pulm consulted for further recommendations.    #Stage IV large cell neuroendocrine cancer   - currently on treatment with pemetrexed and pembrolizumab q 3 weeks (last on 07/2021)  - CT scan shows extensive metastatic disease as well as RLL compression from extrinsic compression of hilar mass (and possibly endobronchial lesion?) with RLL atelectasis  - per heme note, PET/CT from 07/17 showing right hilar mass, numerous pulmonary mets, thoracic and mediastinal lymphadenopathy, pleural mets, and osseous mets (L5, left hemisacrum, left anterior 6th rib, right prox humeral shaft, left posterior arch of C1  - unclear if RLL collapse is new?  - chest pain over right hemithorax is likely secondary to pleural irritation    Recommendations:  - patient with SOB in the setting of pulm mets and RLL collapse  - given immunocompromised state and elevate wbc ount with RLL collapse; concern for post obstructive PNA and would treat with abx  - discussed case with interventional pulmonary attending and can offer bronchoscopic evaluation next week (likely Tuesday)--> will determine stent placement during procedure as cannot tell from current CT if patient with endobronchial lesion as well  - would be helpful to [place] [facility] [place] if possible  - patient with extensive emphysema and likely has COPD (never been formally diagnosed); no wheezing on exam; can start spiriva inpatient

## 2023-08-10 NOTE — PROGRESS NOTE ADULT - ASSESSMENT
69 yo female with PMH lung ca, DM 2 recently diagnosed admitted with evaluation of shortness of breath and cough clinical presentation consistent with worsening disease burden, no pneumonia or PE on CTA       Problem/Plan - 1:  ·  Problem: Cancer of lung.   ·  Plan:   likely secondary to extensive disease burden and extrinsic tumor compression of right bronchus  continue O2 as needed  oncology consultation appreciated   patient has stage 4 large cell neuroendocrine tumor.     Problem/Plan - 2:  ·  Problem: Lesion of thoracic vertebra.   ·  Plan: likely metastatic   CT Lumbar and cervical spine noted  bone scan positive for lesion L5 mid thoracic and right femur shaft   ortho oncology consultation devaughn by me  X ray right femur  spine ortho follow up   awaiting MRI.     Problem/Plan - 3:  ·  Problem: Post Obstruction Pneumonia with acute respiratory failure with hypoxia with COPD  .   ·  Plan: likely malignancy . May need stent .  Abxs per Pulmonary .  < from: CT Angio Chest PE Protocol w/ IV Cont (08.07.23 @ 05:01) >  IMPRESSION:  No pulmonary embolism.    Extensive bilateral pulmonary metastases and confluent mediastinal/hilar   adenopathy. Likely lymphangitic spread of tumor. Lytic lesion in the T10   vertebral body suspicious for bony metastasis. Nonspecific left adrenal   gland thickening. Extrinsic compression on right lower lobe bronchus with   right lower lobe subsegmental atelectasis.  Findings are concerning for   bronchogenic malignancy.  Consider small cell carcinoma.    < end of copied text >     Problem/Plan - 4:  ·  Problem: DM (diabetes mellitus).   ·  Plan: finger sticks with short acting insulin sliding scale  will continue Lantus  8 units.

## 2023-08-10 NOTE — CONSULT NOTE ADULT - SUBJECTIVE AND OBJECTIVE BOX
68F with recently diagnosed neuroendocrine lung cancer (dx in Jan 2023) on pemetrexed and pembrolizumab presents to the ER for shortness of breath and cough x 1 week, acutely worsened yesterday after [place] a [place]. Patient states she has worsened exertional dyspnea and right sided chest pain. She is a former smoker (~.5 PPD for many years) and denies prior diagnosis of COPD. She used albuterol PRN at home.    Admitted for hypoxemic resp failure and chest pain. Pulm consulted for bronchial stent placement.      PAST MEDICAL & SURGICAL HISTORY:  DM (diabetes mellitus)      Cancer of lung      No significant past surgical history          FAMILY HISTORY:  No pertinent family history in first degree relatives        SOCIAL HISTORY:  Smoking: [ ] Never Smoked [X ] Former Smoker (_.5_ packs x __40_ years) [ ] Current Smoker  (__ packs x ___ years)  Substance Use: [ ] Never Used [ ] Used ____  EtOH Use:  Marital Status: [ ] Single [ ]  [ ]  [ ]   Sexual History:   Occupation:  Recent Travel:  Country of Birth:  Advance Directives:    Allergies    No Known Allergies    Intolerances        HOME MEDICATIONS:    REVIEW OF SYSTEMS:  Constitutional: [ X] negative [ ] fevers [ ] chills [ ] weight loss [ ] weight gain  HEENT: [X ] negative [ ] dry eyes [ ] eye irritation [ ] postnasal drip [ ] nasal congestion  CV: [ X] negative  [ ] chest pain [ ] orthopnea [ ] palpitations [ ] murmur  Resp: [ ] negative [X ] cough [X ] shortness of breath [ ] dyspnea [ ] wheezing [ ] sputum [ ] hemoptysis  GI: [ X] negative [ ] nausea [ ] vomiting [ ] diarrhea [ ] constipation [ ] abd pain [ ] dysphagia   : [ X] negative [ ] dysuria [ ] nocturia [ ] hematuria [ ] increased urinary frequency  Musculoskeletal: [ ] negative [ ] back pain [ ] myalgias [ ] arthralgias [ ] fracture  Skin: [ ] negative [ ] rash [ ] itch  Neurological: [ ] negative [ ] headache [ ] dizziness [ ] syncope [ ] weakness [ ] numbness  Psychiatric: [ ] negative [ ] anxiety [ ] depression  Endocrine: [ ] negative [ ] diabetes [ ] thyroid problem  Hematologic/Lymphatic: [ ] negative [ ] anemia [ ] bleeding problem  Allergic/Immunologic: [ ] negative [ ] itchy eyes [ ] nasal discharge [ ] hives [ ] angioedema  [ ] All other systems negative  [ ] Unable to assess ROS because ________    OBJECTIVE:  ICU Vital Signs Last 24 Hrs  T(C): 36.6 (10 Aug 2023 16:34), Max: 37.2 (10 Aug 2023 09:56)  T(F): 97.8 (10 Aug 2023 16:34), Max: 98.9 (10 Aug 2023 09:56)  HR: 100 (10 Aug 2023 16:34) (100 - 111)  BP: 108/64 (10 Aug 2023 16:34) (108/64 - 124/58)  BP(mean): --  ABP: --  ABP(mean): --  RR: 18 (10 Aug 2023 16:34) (16 - 20)  SpO2: 100% (10 Aug 2023 16:34) (100% - 100%)    O2 Parameters below as of 10 Aug 2023 16:34  Patient On (Oxygen Delivery Method): nasal cannula  O2 Flow (L/min): 2            CAPILLARY BLOOD GLUCOSE      POCT Blood Glucose.: 136 mg/dL (10 Aug 2023 15:58)      PHYSICAL EXAM:  General: no acute distress, laying in bed, with O2 NC  HEENT: no icterus  Neck: supple  Respiratory: decreased breath sounds over right base  Cardiovascular: s1.s2, rrr,   Abdomen: soft, nontender  Extremities: no LE edema  Skin: no rashes  Neurological: AXoX3  Psychiatry: normal mood and affect    HOSPITAL MEDICATIONS:  heparin   Injectable 5000 Unit(s) SubCutaneous every 12 hours        dextrose 50% Injectable 25 Gram(s) IV Push once  dextrose 50% Injectable 25 Gram(s) IV Push once  dextrose 50% Injectable 12.5 Gram(s) IV Push once  dextrose Oral Gel 15 Gram(s) Oral once PRN  glucagon  Injectable 1 milliGRAM(s) IntraMuscular once  insulin glargine Injectable (LANTUS) 8 Unit(s) SubCutaneous at bedtime  insulin lispro (ADMELOG) corrective regimen sliding scale   SubCutaneous at bedtime  insulin lispro (ADMELOG) corrective regimen sliding scale   SubCutaneous three times a day before meals      acetaminophen     Tablet .. 650 milliGRAM(s) Oral every 6 hours PRN  morphine  - Injectable 2 milliGRAM(s) IV Push every 6 hours PRN  oxyCODONE    IR 5 milliGRAM(s) Oral every 6 hours PRN    polyethylene glycol 3350 17 Gram(s) Oral daily  senna 2 Tablet(s) Oral at bedtime        dextrose 5%. 1000 milliLiter(s) IV Continuous <Continuous>  dextrose 5%. 1000 milliLiter(s) IV Continuous <Continuous>            LABS:                        11.0   14.84 )-----------( 696      ( 09 Aug 2023 06:20 )             34.3     Hgb Trend: 11.0<--, 12.2<--, 11.4<--, 11.2<--  08-09    133<L>  |  95<L>  |  10  ----------------------------<  178<H>  4.5   |  27  |  0.60    Ca    9.8      09 Aug 2023 06:20  Phos  2.9     08-09  Mg     2.10     08-09      Creatinine Trend: 0.60<--, 0.50<--, 0.50<--, 0.56<--    Urinalysis Basic - ( 09 Aug 2023 06:20 )    Color: x / Appearance: x / SG: x / pH: x  Gluc: 178 mg/dL / Ketone: x  / Bili: x / Urobili: x   Blood: x / Protein: x / Nitrite: x   Leuk Esterase: x / RBC: x / WBC x   Sq Epi: x / Non Sq Epi: x / Bacteria: x            MICROBIOLOGY:     RADIOLOGY:  [X ] Reviewed and interpreted by me    ct< from: CT Angio Chest PE Protocol w/ IV Cont (08.07.23 @ 05:01) >    IMPRESSION:  No pulmonary embolism.    Extensive bilateral pulmonary metastases and confluent mediastinal/hilar   adenopathy. Likely lymphangitic spread of tumor. Lytic lesion in the T10   vertebral body suspicious for bony metastasis. Nonspecific left adrenal   gland thickening. Extrinsic compression on right lower lobe bronchus with   right lower lobe subsegmental atelectasis.  Findings are concerning for   bronchogenic malignancy.  Consider small cell carcinoma.      < end of copied text >

## 2023-08-10 NOTE — PROGRESS NOTE ADULT - SUBJECTIVE AND OBJECTIVE BOX
Patient is a 68y old  Female who presents with a chief complaint of shortness of breath (09 Aug 2023 14:57)    Patient seen this morning. She feels generally well and has no new complaints at this time. States that her breathing is comfortable, no chest pain. Right rib/side pain improving.    MEDICATIONS  (STANDING):  dextrose 5%. 1000 milliLiter(s) (50 mL/Hr) IV Continuous <Continuous>  dextrose 5%. 1000 milliLiter(s) (100 mL/Hr) IV Continuous <Continuous>  dextrose 50% Injectable 25 Gram(s) IV Push once  dextrose 50% Injectable 25 Gram(s) IV Push once  dextrose 50% Injectable 12.5 Gram(s) IV Push once  glucagon  Injectable 1 milliGRAM(s) IntraMuscular once  heparin   Injectable 5000 Unit(s) SubCutaneous every 12 hours  insulin glargine Injectable (LANTUS) 8 Unit(s) SubCutaneous at bedtime  insulin lispro (ADMELOG) corrective regimen sliding scale   SubCutaneous at bedtime  insulin lispro (ADMELOG) corrective regimen sliding scale   SubCutaneous three times a day before meals  polyethylene glycol 3350 17 Gram(s) Oral daily  senna 2 Tablet(s) Oral at bedtime    MEDICATIONS  (PRN):  acetaminophen     Tablet .. 650 milliGRAM(s) Oral every 6 hours PRN Temp greater or equal to 38C (100.4F), Mild Pain (1 - 3)  dextrose Oral Gel 15 Gram(s) Oral once PRN Blood Glucose LESS THAN 70 milliGRAM(s)/deciliter  morphine  - Injectable 2 milliGRAM(s) IV Push every 6 hours PRN Severe Pain (7 - 10)  oxyCODONE    IR 5 milliGRAM(s) Oral every 6 hours PRN Moderate Pain (4 - 6)        Vital Signs Last 24 Hrs  T(C): 37.2 (10 Aug 2023 09:56), Max: 37.2 (10 Aug 2023 09:56)  T(F): 98.9 (10 Aug 2023 09:56), Max: 98.9 (10 Aug 2023 09:56)  HR: 107 (10 Aug 2023 09:56) (102 - 109)  BP: 112/60 (10 Aug 2023 09:56) (110/61 - 124/58)  BP(mean): --  RR: 18 (10 Aug 2023 09:56) (15 - 18)  SpO2: 100% (10 Aug 2023 09:56) (100% - 100%)    Parameters below as of 10 Aug 2023 09:56  Patient On (Oxygen Delivery Method): nasal cannula  O2 Flow (L/min): 2      PE  NAD  Awake, alert  Anicteric, MMM  No c/c/e  No rash grossly  FROM                          11.0   14.84 )-----------( 696      ( 09 Aug 2023 06:20 )             34.3       08-09    133<L>  |  95<L>  |  10  ----------------------------<  178<H>  4.5   |  27  |  0.60    Ca    9.8      09 Aug 2023 06:20  Phos  2.9     08-09  Mg     2.10     08-09

## 2023-08-10 NOTE — PROVIDER CONTACT NOTE (OTHER) - BACKGROUND
patient admitted for shortness of breath
Patient admitted for [facility]. [facility] cancer of lung, DM.
admitted for SOB

## 2023-08-10 NOTE — PROGRESS NOTE ADULT - ASSESSMENT
This is a 68 year old female with stage IV large cell neuroendocrine cancer who presents with dyspnea and cough.    1. Stage IV large cell neuroendocrine cancer   -- Involving lungs, bones, and lymph nodes   -- Currently on treatment with pemetrexed and pembrolizumab (LD 07/21)   -- No systemic treatment while admitted  -- Follow up with Dr. Rodriguez at [facility] [place] after [place]     2. Shortness of breath   -- Secondary to disease burden vs rib pain (pt states when she gets rib pain it affects her breathing, otherwise no shortness of breath)   -- CTA chest w/o PE; extensive pulm mets and mediastinal/hilar lymphadenopathy; T10 lytic lesion; extrinsic compression of RLL bronchus   -- Recommend pulmonary evaluation for RLL bronchus compression, for consideration of bronchoscopy   -- PET/CT from 07/17 with right hilar mass, numerous pulmonary mets, thoracic and mediastinal lymphadenopathy, pleural mets, and osseous mets (L5, left hemisacrum, left anterior 6th rib, right prox humeral shaft, left posterior arch of C1)   -- Continue supplemental O2 as needed, wean as tolerated     3. Osseous mets   -- Neurosurgery consulted for T10 lytic lesion, f/u MRI thoracic + lumbar spine  -- Bone scan with findings suspicious for osseous mets involving mid thoracic spine, L5, midshaft of R femur; metastatic disease vs post-traumatic changes in L mid and R lower ribs   -- Ortho consulted, R femur lesion not concerning for mets  -- Continue pain control     Will continue to follow.    Adelaide Sampson PA-C  Hematology/Oncology  [facility]  [phone] (office)  [phone] (alt office)  Evenings and weekends please call MD on call or office

## 2023-08-10 NOTE — PROGRESS NOTE ADULT - SUBJECTIVE AND OBJECTIVE BOX
Date of Service  : 08-10-23     INTERVAL HPI/OVERNIGHT EVENTS: I feel better.   Vital Signs Last 24 Hrs  T(C): 36.6 (10 Aug 2023 16:34), Max: 37.2 (10 Aug 2023 09:56)  T(F): 97.8 (10 Aug 2023 16:34), Max: 98.9 (10 Aug 2023 09:56)  HR: 100 (10 Aug 2023 16:34) (100 - 111)  BP: 108/64 (10 Aug 2023 16:34) (108/64 - 124/58)  BP(mean): --  RR: 18 (10 Aug 2023 16:34) (16 - 20)  SpO2: 100% (10 Aug 2023 16:34) (100% - 100%)    Parameters below as of 10 Aug 2023 16:34  Patient On (Oxygen Delivery Method): nasal cannula  O2 Flow (L/min): 2    I&O's Summary    MEDICATIONS  (STANDING):  dextrose 5%. 1000 milliLiter(s) (50 mL/Hr) IV Continuous <Continuous>  dextrose 5%. 1000 milliLiter(s) (100 mL/Hr) IV Continuous <Continuous>  dextrose 50% Injectable 25 Gram(s) IV Push once  dextrose 50% Injectable 25 Gram(s) IV Push once  dextrose 50% Injectable 12.5 Gram(s) IV Push once  glucagon  Injectable 1 milliGRAM(s) IntraMuscular once  heparin   Injectable 5000 Unit(s) SubCutaneous every 12 hours  insulin glargine Injectable (LANTUS) 8 Unit(s) SubCutaneous at bedtime  insulin lispro (ADMELOG) corrective regimen sliding scale   SubCutaneous at bedtime  insulin lispro (ADMELOG) corrective regimen sliding scale   SubCutaneous three times a day before meals  polyethylene glycol 3350 17 Gram(s) Oral daily  senna 2 Tablet(s) Oral at bedtime    MEDICATIONS  (PRN):  acetaminophen     Tablet .. 650 milliGRAM(s) Oral every 6 hours PRN Temp greater or equal to 38C (100.4F), Mild Pain (1 - 3)  dextrose Oral Gel 15 Gram(s) Oral once PRN Blood Glucose LESS THAN 70 milliGRAM(s)/deciliter  morphine  - Injectable 2 milliGRAM(s) IV Push every 6 hours PRN Severe Pain (7 - 10)  oxyCODONE    IR 5 milliGRAM(s) Oral every 6 hours PRN Moderate Pain (4 - 6)    LABS:                        11.0   14.84 )-----------( 696      ( 09 Aug 2023 06:20 )             34.3     08-09    133<L>  |  95<L>  |  10  ----------------------------<  178<H>  4.5   |  27  |  0.60    Ca    9.8      09 Aug 2023 06:20  Phos  2.9     08-09  Mg     2.10     08-09        Urinalysis Basic - ( 09 Aug 2023 06:20 )    Color: x / Appearance: x / SG: x / pH: x  Gluc: 178 mg/dL / Ketone: x  / Bili: x / Urobili: x   Blood: x / Protein: x / Nitrite: x   Leuk Esterase: x / RBC: x / WBC x   Sq Epi: x / Non Sq Epi: x / Bacteria: x      CAPILLARY BLOOD GLUCOSE      POCT Blood Glucose.: 136 mg/dL (10 Aug 2023 15:58)  POCT Blood Glucose.: 157 mg/dL (10 Aug 2023 12:59)  POCT Blood Glucose.: 144 mg/dL (10 Aug 2023 09:18)  POCT Blood Glucose.: 229 mg/dL (09 Aug 2023 21:33)        Urinalysis Basic - ( 09 Aug 2023 06:20 )    Color: x / Appearance: x / SG: x / pH: x  Gluc: 178 mg/dL / Ketone: x  / Bili: x / Urobili: x   Blood: x / Protein: x / Nitrite: x   Leuk Esterase: x / RBC: x / WBC x   Sq Epi: x / Non Sq Epi: x / Bacteria: x      REVIEW OF SYSTEMS:  CONSTITUTIONAL: No fever, weight loss, or fatigue  EYES: No eye pain, visual disturbances, or discharge  ENMT:  No difficulty hearing, tinnitus, vertigo; No sinus or throat pain  NECK: No pain or stiffness  RESPIRATORY: No cough, wheezing, chills or hemoptysis; No shortness of breath  CARDIOVASCULAR: No chest pain, palpitations, dizziness, or leg swelling  GASTROINTESTINAL: No abdominal or epigastric pain. No nausea, vomiting, or hematemesis; No diarrhea or constipation. No melena or hematochezia.  GENITOURINARY: No dysuria, frequency, hematuria, or incontinence  NEUROLOGICAL: No headaches, memory loss, loss of strength, numbness, or tremors      Consultant(s) Notes Reviewed:  [x ] YES  [ ] NO    PHYSICAL EXAM:  GENERAL: NAD, Oxygen   HEAD:  Atraumatic, Normocephalic  NECK: Supple, No JVD, Normal thyroid  NERVOUS SYSTEM:  Alert & Oriented X3, No focal deficit   CHEST/LUNG: Good air entry except right base  HEART: Regular rate and rhythm; No murmurs, rubs, or gallops  ABDOMEN: Soft, Nontender, Nondistended; Bowel sounds present  EXTREMITIES:  2+ Peripheral Pulses, No clubbing, cyanosis, or edema    Care Discussed with Consultants/Other Providers [ x] YES  [ ] NO

## 2023-08-11 NOTE — CONSULT NOTE ADULT - CONSULT REQUESTED DATE/TIME
07-Aug-2023 07:17
07-Aug-2023 15:30
10-Aug-2023 18:28
09-Aug-2023
11-Aug-2023 12:41
11-Aug-2023 16:07

## 2023-08-11 NOTE — CONSULT NOTE ADULT - SUBJECTIVE AND OBJECTIVE BOX
HPI:  67 yo female with PMH lung ca on CT (last CT 3 weeks ago), DM for evaluation of shortness of breath and cough x 1 week, acutely worsened yesterday after walking a lot visiting the statue of [place]. Patient states she has worsened exertional dyspnea. Denies fevers, chills, chest pain, nausea, vomiting. +intermittent palpitations. Denies h/o deep venous thrombosis or PE. Not on AC. Former smoker. (07 Aug 2023 11:15)    Derm HPI:   Dermatology consulted for nail changes, primarily of R first digit. Pt states she has had dark marks on many nails for many yrs. Lesion on R nail has progressively gotten darker / larger.       PAST MEDICAL & SURGICAL HISTORY:  DM (diabetes mellitus)      Cancer of lung      No significant past surgical history          Review of Systems:     Gen: + loss of wt + loss of appetite  Ophth: no blurring of vision no loss of vision  Resp: No cough no sputum production  CVS: No chest pain no palpitations no orthopnea  GI: no nausea, vomiting or diarrhea   : no dysuria, hematuria  Endo: no polyuria no excessive sweating  Neuro: no weakness no paresthesias    MEDICATIONS  (STANDING):  cefTRIAXone   IVPB      dextrose 5%. 1000 milliLiter(s) IV Continuous <Continuous>  dextrose 5%. 1000 milliLiter(s) IV Continuous <Continuous>  dextrose 50% Injectable 25 Gram(s) IV Push once  dextrose 50% Injectable 12.5 Gram(s) IV Push once  dextrose 50% Injectable 25 Gram(s) IV Push once  glucagon  Injectable 1 milliGRAM(s) IntraMuscular once  heparin   Injectable 5000 Unit(s) SubCutaneous every 12 hours  insulin glargine Injectable (LANTUS) 8 Unit(s) SubCutaneous at bedtime  insulin lispro (ADMELOG) corrective regimen sliding scale   SubCutaneous at bedtime  insulin lispro (ADMELOG) corrective regimen sliding scale   SubCutaneous three times a day before meals  polyethylene glycol 3350 17 Gram(s) Oral daily  senna 2 Tablet(s) Oral at bedtime  sodium chloride 1 Gram(s) Oral three times a day  tiotropium 2.5 MICROgram(s) Inhaler 2 Puff(s) Inhalation daily    ALLERGIES: No Known Allergies        SOCIAL HISTORY:  ____________________________________  Social History:  ex-smoker  no IVDA  no ETOH abuse   lives with son  FAMILY HISTORY:  No pertinent family history in first degree relatives          VITAL SIGNS LAST 24 HOURS:  T(F): 98.2 (08-11 @ 13:00), Max: 98.4 (08-10 @ 21:00)  HR: 108 (08-11 @ 13:00) (100 - 122)  BP: 111/54 (08-11 @ 13:00) (103/68 - 114/65)  RR: 18 (08-11 @ 13:00) (17 - 18)    PHYSICAL EXAM:     The patient was alert and conversant and in no apparent distress. On NC   There was no visible lymphadenopathy.  Conjunctiva were non injected  There was no clubbing or edema of extremities.  The scalp, hair, face, eyebrows, lips, neck, chest, back,   extremities X 4, nails were examined.  There was no hyperhidrosis or bromhidrosis.    Of note on skin exam:   - linear stripes on numerous nails   - largest stripe on R 1st digit, expanding >50% of nail but does not extend to the proximal nail fold, with associated subungual debris   - thickening of nail plates on all toenails   - scaling of b/l soles of feet   ____________________________________    LABS:                        10.7   13.88 )-----------( 667      ( 11 Aug 2023 06:23 )             33.1     08-11    127<L>  |  92<L>  |  9   ----------------------------<  178<H>  4.2   |  27  |  0.54    Ca    10.0      11 Aug 2023 06:23  Phos  3.3     08-11  Mg     2.00     08-11    TPro  7.8  /  Alb  3.1<L>  /  TBili  0.4  /  DBili  x   /  AST  29  /  ALT  14  /  AlkPhos  71  08-11      Urinalysis Basic - ( 11 Aug 2023 06:23 )    Color: x / Appearance: x / SG: x / pH: x  Gluc: 178 mg/dL / Ketone: x  / Bili: x / Urobili: x   Blood: x / Protein: x / Nitrite: x   Leuk Esterase: x / RBC: x / WBC x   Sq Epi: x / Non Sq Epi: x / Bacteria: x     HPI:  69 yo female with PMH lung ca on CT (last CT 3 weeks ago), DM for evaluation of shortness of breath and cough x 1 week, acutely worsened yesterday after walking a lot visiting the statue of [place]. Patient states she has worsened exertional dyspnea. Denies fevers, chills, chest pain, nausea, vomiting. +intermittent palpitations. Denies h/o deep venous thrombosis or PE. Not on AC. Former smoker. (07 Aug 2023 11:15)    Derm HPI:   Dermatology consulted for nail changes, primarily of R first digit. Pt states she has had dark marks on many nails for many yrs. Lesion on R nail has progressively gotten darker / larger. Reports history of manicures she think may be a trigger. Asymptomatic. Denies known personal or family history of skin cancer.      PAST MEDICAL & SURGICAL HISTORY:  DM (diabetes mellitus)      Cancer of lung      No significant past surgical history          Review of Systems:     Gen: + loss of wt + loss of appetite  Ophth: no blurring of vision no loss of vision      MEDICATIONS  (STANDING):  cefTRIAXone   IVPB      dextrose 5%. 1000 milliLiter(s) IV Continuous <Continuous>  dextrose 5%. 1000 milliLiter(s) IV Continuous <Continuous>  dextrose 50% Injectable 25 Gram(s) IV Push once  dextrose 50% Injectable 12.5 Gram(s) IV Push once  dextrose 50% Injectable 25 Gram(s) IV Push once  glucagon  Injectable 1 milliGRAM(s) IntraMuscular once  heparin   Injectable 5000 Unit(s) SubCutaneous every 12 hours  insulin glargine Injectable (LANTUS) 8 Unit(s) SubCutaneous at bedtime  insulin lispro (ADMELOG) corrective regimen sliding scale   SubCutaneous at bedtime  insulin lispro (ADMELOG) corrective regimen sliding scale   SubCutaneous three times a day before meals  polyethylene glycol 3350 17 Gram(s) Oral daily  senna 2 Tablet(s) Oral at bedtime  sodium chloride 1 Gram(s) Oral three times a day  tiotropium 2.5 MICROgram(s) Inhaler 2 Puff(s) Inhalation daily    ALLERGIES: No Known Allergies        SOCIAL HISTORY:  ____________________________________  Social History:  ex-smoker  no IVDA  no ETOH abuse   lives with son  FAMILY HISTORY:  No pertinent family history in first degree relatives          VITAL SIGNS LAST 24 HOURS:  T(F): 98.2 (08-11 @ 13:00), Max: 98.4 (08-10 @ 21:00)  HR: 108 (08-11 @ 13:00) (100 - 122)  BP: 111/54 (08-11 @ 13:00) (103/68 - 114/65)  RR: 18 (08-11 @ 13:00) (17 - 18)    PHYSICAL EXAM:     The patient was alert and conversant and in no apparent distress. On NC   There was no visible lymphadenopathy.  Conjunctiva were non injected  There was no clubbing or edema of extremities.  The scalp, hair, face, eyebrows, lips, neck, chest, back,   extremities X 4, nails were examined.  There was no hyperhidrosis or bromhidrosis.    Of note on skin exam:   - hyperpigmented linear bands on numerous fingernails and toenails  - largest stripe on R 1st digit, >50% of nail but does not extend to the proximal nail fold (negative Fischer sign), with associated subungual debris   - thickening of nail plates on all toenails   - scaling of b/l soles of feet   ____________________________________    LABS:                        10.7   13.88 )-----------( 667      ( 11 Aug 2023 06:23 )             33.1     08-11    127<L>  |  92<L>  |  9   ----------------------------<  178<H>  4.2   |  27  |  0.54    Ca    10.0      11 Aug 2023 06:23  Phos  3.3     08-11  Mg     2.00     08-11    TPro  7.8  /  Alb  3.1<L>  /  TBili  0.4  /  DBili  x   /  AST  29  /  ALT  14  /  AlkPhos  71  08-11      Urinalysis Basic - ( 11 Aug 2023 06:23 )    Color: x / Appearance: x / SG: x / pH: x  Gluc: 178 mg/dL / Ketone: x  / Bili: x / Urobili: x   Blood: x / Protein: x / Nitrite: x   Leuk Esterase: x / RBC: x / WBC x   Sq Epi: x / Non Sq Epi: x / Bacteria: x

## 2023-08-11 NOTE — CONSULT NOTE ADULT - ASSESSMENT
67 yo female with PMH lung ca on CT (last CT 3 weeks ago), DM for evaluation of shortness of breath and cough x 1 week, acutely worsened yesterday after walking a lot visiting the statue of [employer]. nephrology consulted for hyponatremia.     Hyponatremia  pt denied hx of known hyponatremia however na has been low since admission  likely SIADH  free water restriction <1L/day pt educated  start na 1g tid x2 days  monitor  sent urine na and osmo  avoid overcorrection, na should not correct >8meq in 24 hrs

## 2023-08-11 NOTE — CONSULT NOTE ADULT - ATTENDING COMMENTS
68F w/ hx of DM, recent diagnosis of lung ca, referred for evaluation due to findings of questionable uptake of the right femur on bone scan. Follow up xrays show a small benign appearing calcification, likely an enchondroma. No aggressive features. Clinically she has no RLE pain. No further treatment is indicated. Continue systemic care per heme-onc.
Diffuse large pulmonary metastatic disease with bulky confluent mediastinal and hilar lymphadenopathy causing extrinsic compression of the RLL bronchus with consequent RLL subsegmental atelectasis. Discussed with IP. Plan for bronchoscopy with evaluation and possible debulking vs. evaluation for endobronchial stent. Would consider treatment of post-obstructive pneumonia given associated dyspnea and leukocytosis, especially if she spikes a fever or develops worsening cough. Consider starting Spiriva or Bevespi given extensive emphysema seen on CT chest and possibly contributing to dyspnea.
I examined this patient at bedside on 8/11/23. 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 The differential diagnosis of longitudinal melanonychia affecting multiple nails also consists of an iatrogenic cause like drug-induced hyperpigmentation, especially chemotherapeutic agents, repetitive trauma, and onychomycosis. The etiology in this patient's case is likely attributed to several of these factors. There is less concern for melanoma of the nail matrix, especially as the proximal nail fold is not involved; however we can monitor the R 1st digit outpatient and consider nail biopsy in future if indicated. Please page/re-consult if any questions or new/concerning skin or nail changes.

## 2023-08-11 NOTE — CONSULT NOTE ADULT - SUBJECTIVE AND OBJECTIVE BOX
[facility]   MD BASIA BOWLES MD KRISTINE SOLTANPOUR, DO ANGELA WONG, PA        TEL:  OFFICE: [phone]  From 5pm-7am answering service [phone]    --- INITIAL RENAL CONSULT NOTE ---date of service 08-11-23 @ 12:41    HPI:  67 yo female with PMH lung ca on CT (last CT 3 weeks ago), DM for evaluation of shortness of breath and cough x 1 week, acutely worsened yesterday after walking a lot visiting the statue [place]. Patient states she has worsened exertional dyspnea. Denies fevers, chills, chest pain, nausea, vomiting. +intermittent palpitations. Denies h/o deep venous thrombosis or PE. Not on AC. Former smoker.   nephrology consulted for hyponatremia. pt denied known hx of hyponatremia        Allergies:  No Known Allergies      PAST MEDICAL & SURGICAL HISTORY:  DM (diabetes mellitus)      Cancer of lung      No significant past surgical history          Home Medications Reviewed    Hospital Medications:   MEDICATIONS  (STANDING):  cefTRIAXone   IVPB      dextrose 5%. 1000 milliLiter(s) (50 mL/Hr) IV Continuous <Continuous>  dextrose 5%. 1000 milliLiter(s) (100 mL/Hr) IV Continuous <Continuous>  dextrose 50% Injectable 25 Gram(s) IV Push once  dextrose 50% Injectable 25 Gram(s) IV Push once  dextrose 50% Injectable 12.5 Gram(s) IV Push once  glucagon  Injectable 1 milliGRAM(s) IntraMuscular once  heparin   Injectable 5000 Unit(s) SubCutaneous every 12 hours  insulin glargine Injectable (LANTUS) 8 Unit(s) SubCutaneous at bedtime  insulin lispro (ADMELOG) corrective regimen sliding scale   SubCutaneous at bedtime  insulin lispro (ADMELOG) corrective regimen sliding scale   SubCutaneous three times a day before meals  polyethylene glycol 3350 17 Gram(s) Oral daily  senna 2 Tablet(s) Oral at bedtime  tiotropium 2.5 MICROgram(s) Inhaler 2 Puff(s) Inhalation daily      SOCIAL HISTORY:  former smoker    FAMILY HISTORY:  No pertinent family history in first degree relatives        REVIEW OF SYSTEMS:  CONSTITUTIONAL: No weakness, fevers or chills  EYES/ENT: No visual changes;  No vertigo or throat pain   NECK: No pain or stiffness  RESPIRATORY: see hpi  CARDIOVASCULAR: No chest pain or palpitations.  GASTROINTESTINAL: No abdominal or epigastric pain. No nausea, vomiting, or hematemesis; No diarrhea or constipation. No melena or hematochezia.  GENITOURINARY: No dysuria, frequency, foamy urine, urinary urgency, incontinence or hematuria  NEUROLOGICAL: No numbness or weakness  SKIN: No itching, burning, rashes, or lesions   VASCULAR: No bilateral lower extremity edema.   All other review of systems is negative unless indicated above.    VITALS:  T(F): 98.2 (08-11-23 @ 05:00), Max: 98.4 (08-10-23 @ 21:00)  HR: 104 (08-11-23 @ 05:00)  BP: 103/68 (08-11-23 @ 05:00)  RR: 17 (08-11-23 @ 05:00)  SpO2: 98% (08-11-23 @ 05:00)  Wt(kg): --        PHYSICAL EXAM:  General: NAD  HEENT: anicteric sclera, oropharynx clear, MMM  Neck: No JVD  Respiratory: CTAB, no wheezes, rales or rhonchi  Cardiovascular: S1, S2, RRR  Gastrointestinal: BS+, soft, NT/ND  Extremities: No cyanosis or clubbing. No peripheral edema  Neurological: A/O x 3, no focal deficits  Psychiatric: Normal mood, normal affect  : No CVA tenderness. No alicea.   Skin: No rashes      LABS:  08-11    127<L>  |  92<L>  |  9   ----------------------------<  178<H>  4.2   |  27  |  0.54    Ca    10.0      11 Aug 2023 06:23  Phos  3.3     08-11  Mg     2.00     08-11    TPro  7.8  /  Alb  3.1<L>  /  TBili  0.4  /  DBili      /  AST  29  /  ALT  14  /  AlkPhos  71  08-11    Creatinine Trend: 0.54 <--, 0.60 <--, 0.50 <--, 0.50 <--, 0.56 <--                        10.7   13.88 )-----------( 667      ( 11 Aug 2023 06:23 )             33.1     Urine Studies:  Urinalysis Basic - ( 11 Aug 2023 06:23 )    Color:  / Appearance:  / SG:  / pH:   Gluc: 178 mg/dL / Ketone:   / Bili:  / Urobili:    Blood:  / Protein:  / Nitrite:    Leuk Esterase:  / RBC:  / WBC    Sq Epi:  / Non Sq Epi:  / Bacteria:           RADIOLOGY & ADDITIONAL STUDIES:                 [facility] [place] [facility]   MD BASIA BOWLES MD KRISTINE SOLTANPOUR, DO ANGELA WONG, PA        TEL:  OFFICE: [phone]  From 5pm-7am answering service [phone]    --- INITIAL RENAL CONSULT NOTE ---date of service 08-11-23 @ 12:41    HPI:  69 yo female with PMH lung ca on CT (last CT 3 weeks ago), DM for evaluation of shortness of breath and cough x 1 week, acutely worsened yesterday after walking a lot visiting the statue [place]. Patient states she has worsened exertional dyspnea. Denies fevers, chills, chest pain, nausea, vomiting. +intermittent palpitations. Denies h/o deep venous thrombosis or PE. Not on AC. Former smoker. Nephrology consulted for hyponatremia. Patient denied known hx of hyponatremia        Allergies:  No Known Allergies      PAST MEDICAL & SURGICAL HISTORY:  DM (diabetes mellitus)      Cancer of lung      No significant past surgical history        Home Medications:  Basaglar KwikPen 100 units/mL subcutaneous solution: 10 unit(s) subcutaneous once a day AM (07 Aug 2023 11:25)  glimepiride 4 mg oral tablet: 1 orally once a day (07 Aug 2023 11:25)  Glucophage 500 mg oral tablet: 1 orally 2 times a day (07 Aug 2023 11:25)    Home Medications Reviewed    Hospital Medications:   MEDICATIONS  (STANDING):  cefTRIAXone   IVPB      dextrose 5%. 1000 milliLiter(s) (50 mL/Hr) IV Continuous <Continuous>  dextrose 5%. 1000 milliLiter(s) (100 mL/Hr) IV Continuous <Continuous>  dextrose 50% Injectable 25 Gram(s) IV Push once  dextrose 50% Injectable 25 Gram(s) IV Push once  dextrose 50% Injectable 12.5 Gram(s) IV Push once  glucagon  Injectable 1 milliGRAM(s) IntraMuscular once  heparin   Injectable 5000 Unit(s) SubCutaneous every 12 hours  insulin glargine Injectable (LANTUS) 8 Unit(s) SubCutaneous at bedtime  insulin lispro (ADMELOG) corrective regimen sliding scale   SubCutaneous at bedtime  insulin lispro (ADMELOG) corrective regimen sliding scale   SubCutaneous three times a day before meals  polyethylene glycol 3350 17 Gram(s) Oral daily  senna 2 Tablet(s) Oral at bedtime  tiotropium 2.5 MICROgram(s) Inhaler 2 Puff(s) Inhalation daily      SOCIAL HISTORY:  former smoker    FAMILY HISTORY:  No pertinent family history in first degree relatives        REVIEW OF SYSTEMS:  CONSTITUTIONAL: No weakness, fevers or chills  EYES/ENT: No visual changes;  No vertigo or throat pain   NECK: No pain or stiffness  RESPIRATORY: see hpi  CARDIOVASCULAR: No chest pain or palpitations.  GASTROINTESTINAL: No abdominal or epigastric pain. No nausea, vomiting, or hematemesis; No diarrhea or constipation. No melena or hematochezia.  GENITOURINARY: No dysuria, frequency, foamy urine, urinary urgency, incontinence or hematuria  NEUROLOGICAL: No numbness or weakness  SKIN: No itching, burning, rashes, or lesions   VASCULAR: No bilateral lower extremity edema.   All other review of systems is negative unless indicated above.    VITALS:  T(F): 98.2 (08-11-23 @ 05:00), Max: 98.4 (08-10-23 @ 21:00)  HR: 104 (08-11-23 @ 05:00)  BP: 103/68 (08-11-23 @ 05:00)  RR: 17 (08-11-23 @ 05:00)  SpO2: 98% (08-11-23 @ 05:00)  Wt(kg): --        PHYSICAL EXAM:  General: NAD  HEENT: anicteric sclera, oropharynx clear, MMM  Neck: No JVD  Respiratory: CTAB, no wheezes, rales or rhonchi  Cardiovascular: S1, S2, RRR  Gastrointestinal: BS+, soft, NT/ND  Extremities: No cyanosis or clubbing. No peripheral edema  Neurological: A/O x 3, no focal deficits  Psychiatric: Normal mood, normal affect  : No CVA tenderness. No alicea.   Skin: No rashes      LABS:  08-11    127<L>  |  92<L>  |  9   ----------------------------<  178<H>  4.2   |  27  |  0.54    Ca    10.0      11 Aug 2023 06:23  Phos  3.3     08-11  Mg     2.00     08-11    TPro  7.8  /  Alb  3.1<L>  /  TBili  0.4  /  DBili      /  AST  29  /  ALT  14  /  AlkPhos  71  08-11    Creatinine Trend: 0.54 <--, 0.60 <--, 0.50 <--, 0.50 <--, 0.56 <--                        10.7   13.88 )-----------( 667      ( 11 Aug 2023 06:23 )             33.1     Urine Studies:  Urinalysis Basic - ( 11 Aug 2023 06:23 )    Color:  / Appearance:  / SG:  / pH:   Gluc: 178 mg/dL / Ketone:   / Bili:  / Urobili:    Blood:  / Protein:  / Nitrite:    Leuk Esterase:  / RBC:  / WBC    Sq Epi:  / Non Sq Epi:  / Bacteria:           RADIOLOGY & ADDITIONAL STUDIES:  ACC: [id number] EXAM:  MR SPINE THORACIC WAW IC   ORDERED BY: FLORECITA CROOKS     ACC: [id number] EXAM:  MR SPINE LUMBAR WAW IC   ORDERED BY: FLORECITA CROOKS     PROCEDURE DATE:  08/11/2023          INTERPRETATION:  Two examinations were performed and are reported   together:  1. MR thoracic spine with and without gadolinium  2. MR lumbar spine with and without gadolinium    CLINICAL INFORMATION:   Cord compression  ADDITIONAL CLINICAL INFORMATION:   Cancer C80.1    TECHNIQUE (each examination):   Sagittal STIR images, sagittal   T2-weighted images T2-weighted and sagittal T1-weighted images were   obtained.   Axial T1 and T2 weighted images were obtained.  Following   gadolinium administration axial and sagittal fat-saturated T1-weighted   images were obtained.  Each spine segment was obtained as separate   acquisitions.    COMPARISON :   CT chest and CT lumbar 8/7/2023 available for review.    FINDINGS:    THORACIC SPINE:    Thoracic vertebral alignment demonstrates mild exaggeration in the   midthoracic kyphosis. Thoracic vertebral body heights are maintained.   Marrow signal intensity within thoracic vertebral bodies is significant   for focal marrow lesion within the T10 vertebral body. This demonstrates   focal marrow replacement with lesion hypointense on the T1-weighted   images and mixed hyperintense on the long TR images also noting adjacent   marrow edema pattern. With gadolinium administration focal lesion   enhances with more mild generalized enhancement within the vertebral   body. A second noncontiguous lesion involves its left transverse   processes with similar marrow replacement and enhancement. Third smaller   lesion involves the left T7 transverse process. Small focal lesion is   found within T11 near its superior endplate with similar imaging   properties.. No osseous expansion, epidural disease or paraspinal   abnormality is found.    Thoracic intervertebral discs demonstrate mild degeneration with disc   height loss and signal intensity loss on the long TR images of the mid   thoracic intervertebral disc levels. No central canal compromise is   recognized. Thoracic neural foramina appear patent. No pathologic disc   space enhancement is noted.    The thoracic cord maintains intact morphology. No cord signal intensity   change is seen. No intrinsic cord lesion is found. No cord expansion or   cord volume loss is found. No pathologic enhancement is found within the   thoracic canal.    Pulmonary metastases and pathologic mediastinal lymph nodes are evident.    LUMBAR SPINE:    OSSEOUS STRUCTURES AND ALIGNMENT:  Lumbar vertebral alignment is preserved.   Lumbar vertebral body heights   are maintained.  Marrow signal intensity within lumbar vertebra is   significant for focal marrow lesion within the L5 vertebral body. This   lesion is low signal intensity on T1-weighted and T2-weighted images,   nearing conspicuous on STIR imaging and with faint enhancement following   gadolinium administration. There is no associated marrow edema.   No   osseous expansion or epidural disease is identified. No pathologic   vertebral enhancement is found.  SACRUM/SACROILIAC JOINTS/VISUALIZED PELVIC BONES:  The sacrum is significant for focal marrow replacement within the S2   vertebral body. Visualized pelvic bones appear intact.  The sacroiliac   joints are incompletely visualized, as seen intact.    PARASPINAL SOFT TISSUES:    No lesion is identified.  VISUALIZED ABDOMINAL AND PELVIC SOFT TISSUES:   No lesion is identified.    LUMBAR INTERVERTEBRAL DISC LEVELS:    General comments: The higher lumbar intervertebral discs T12-L1 through   L3-L4 maintain preserved height and signal intensity. The lowest 3 disc   levels demonstrate mild degenerative signal intensity loss on the long TR   images. No pathologic disc space enhancement is found.    T12-L1:   No central canal or foraminal stenosis.  L1-L2:    No central canal or foraminal stenosis.  L2-L3:    No central canal or foraminal stenosis.  L3-L4:    No central canal or foraminal stenosis.  L4-L5:    No central canal stenosis. Diffuse disc bulging and facet   arthropathy contribute to low-grade foraminal compromise left greater   than right  L5-S1:   No central canal stenosis. Diffuse disc bulging and facet   arthropathy contribute to low-grade foraminal compromise.    CNS STRUCTURES:  The distal cord maintains intact morphology and signal intensity.  The   conus is  normally positioned at L1.   Nerve roots of the cauda equina   appear intact.   No nerve root nodularity, clumping or dural adherence is   recognized.    OTHER:      IMPRESSION:    1. THORACIC SPINE:   Multiple thoracic vertebral metastases include   within the T10 and T11 vertebral bodies and the T7 and T10 left   transverse processes. No thoracic canal compromise. Thoracic cord contact    2. LUMBAR SPINE:   L5 vertebral lesion is indeterminate, vertebral   metastasis versus hemangioma bone with atypical imaging properties.  S2   metastasis. No lumbar canal compromise. Conus and cauda equina are intact    3. Pulmonary metastases. Mediastinal lymph node metastases    --- End of Report ---            MICHELLE QUINONES MD; Attending Radiologist  This document has been electronically signed. Aug 11 2023 11:36AM

## 2023-08-11 NOTE — PROGRESS NOTE ADULT - ASSESSMENT
67 yo female with PMH lung ca, DM 2 recently diagnosed admitted with evaluation of shortness of breath and cough clinical presentation consistent with worsening disease burden, no pneumonia or PE on CTA       Problem/Plan - 1:  ·  Problem: Cancer of lung.   ·  Plan:   likely secondary to extensive disease burden and extrinsic tumor compression of right bronchus  continue O2 as needed  oncology consultation appreciated   patient has stage 4 large cell neuroendocrine tumor.     Problem/Plan - 2:  ·  Problem: Metastatic Lesion of thoracic vertebra.   ·  Plan:   CT Lumbar and cervical spine noted  bone scan positive for lesion L5 mid thoracic and right femur shaft   ortho oncology consultation devaughn by me  X ray right femur  spine ortho follow up     < from: MR Lumbar Spine w/wo IV Cont (08.11.23 @ 10:43) >  IMPRESSION:    1. THORACIC SPINE:   Multiple thoracic vertebral metastases include   within the T10 and T11 vertebral bodies and the T7 and T10 left   transverse processes. No thoracic canal compromise. Thoracic cord contact    2. LUMBAR SPINE:   L5 vertebral lesion is indeterminate, vertebral   metastasis versus hemangioma bone with atypical imaging properties.  S2   metastasis. No lumbar canal compromise. Conus and cauda equina are intact    3. Pulmonarymetastases. Mediastinal lymph node metastases    Management per oncology .      Problem/Plan - 3:  ·  Problem: Post Obstruction Pneumonia with acute respiratory failure with hypoxia with COPD  .   ·  Plan: likely secondary to malignancy . May need stent .  Abxs per Pulmonary .  < from: CT Angio Chest PE Protocol w/ IV Cont (08.07.23 @ 05:01) >  IMPRESSION:  No pulmonary embolism.    Extensive bilateral pulmonary metastases and confluent mediastinal/hilar   adenopathy. Likely lymphangitic spread of tumor. Lytic lesion in the T10   vertebral body suspicious for bony metastasis. Nonspecific left adrenal   gland thickening. Extrinsic compression on right lower lobe bronchus with   right lower lobe subsegmental atelectasis.  Findings are concerning for   bronchogenic malignancy.  Consider small cell carcinoma.    < end of copied text >     Problem/Plan - 4:  ·  Problem: DM (diabetes mellitus).   ·  Plan: finger sticks with short acting insulin sliding scale  will continue Lantus  8 units.

## 2023-08-11 NOTE — PROGRESS NOTE ADULT - SUBJECTIVE AND OBJECTIVE BOX
Patient is a 68y old  Female who presents with a chief complaint of shortness of breath (11 Aug 2023 13:06)    Patient seen this afternoon, eating lunch. She feels fine and has no new complaints at this time. Denies chest pain or dyspnea. States that her rib/side pain is better.     MEDICATIONS  (STANDING):  cefTRIAXone   IVPB      dextrose 5%. 1000 milliLiter(s) (50 mL/Hr) IV Continuous <Continuous>  dextrose 5%. 1000 milliLiter(s) (100 mL/Hr) IV Continuous <Continuous>  dextrose 50% Injectable 25 Gram(s) IV Push once  dextrose 50% Injectable 12.5 Gram(s) IV Push once  dextrose 50% Injectable 25 Gram(s) IV Push once  glucagon  Injectable 1 milliGRAM(s) IntraMuscular once  heparin   Injectable 5000 Unit(s) SubCutaneous every 12 hours  insulin glargine Injectable (LANTUS) 8 Unit(s) SubCutaneous at bedtime  insulin lispro (ADMELOG) corrective regimen sliding scale   SubCutaneous at bedtime  insulin lispro (ADMELOG) corrective regimen sliding scale   SubCutaneous three times a day before meals  polyethylene glycol 3350 17 Gram(s) Oral daily  senna 2 Tablet(s) Oral at bedtime  sodium chloride 1 Gram(s) Oral three times a day  tiotropium 2.5 MICROgram(s) Inhaler 2 Puff(s) Inhalation daily    MEDICATIONS  (PRN):  acetaminophen     Tablet .. 650 milliGRAM(s) Oral every 6 hours PRN Temp greater or equal to 38C (100.4F), Mild Pain (1 - 3)  dextrose Oral Gel 15 Gram(s) Oral once PRN Blood Glucose LESS THAN 70 milliGRAM(s)/deciliter  morphine  - Injectable 2 milliGRAM(s) IV Push every 6 hours PRN Severe Pain (7 - 10)  oxyCODONE    IR 5 milliGRAM(s) Oral every 6 hours PRN Moderate Pain (4 - 6)        Vital Signs Last 24 Hrs  T(C): 36.8 (11 Aug 2023 13:00), Max: 36.9 (10 Aug 2023 21:00)  T(F): 98.2 (11 Aug 2023 13:00), Max: 98.4 (10 Aug 2023 21:00)  HR: 108 (11 Aug 2023 13:00) (100 - 122)  BP: 111/54 (11 Aug 2023 13:00) (103/68 - 114/65)  BP(mean): --  RR: 18 (11 Aug 2023 13:00) (17 - 18)  SpO2: 100% (11 Aug 2023 13:00) (96% - 100%)    Parameters below as of 11 Aug 2023 13:00  Patient On (Oxygen Delivery Method): nasal cannula  O2 Flow (L/min): 2      PE  NAD  Awake, alert  Anicteric, MMM  +nasal cannula   No c/c/e  No rash grossly  FROM                          10.7   13.88 )-----------( 667      ( 11 Aug 2023 06:23 )             33.1       08-11    127<L>  |  92<L>  |  9   ----------------------------<  178<H>  4.2   |  27  |  0.54    Ca    10.0      11 Aug 2023 06:23  Phos  3.3     08-11  Mg     2.00     08-11    TPro  7.8  /  Alb  3.1<L>  /  TBili  0.4  /  DBili  x   /  AST  29  /  ALT  14  /  AlkPhos  71  08-11      ACC: [id number] EXAM:  MR SPINE THORACIC WAW IC   ORDERED BY: FLORECITA CROOKS     ACC: [id number] EXAM:  MR SPINE LUMBAR WAW IC   ORDERED BY: FLORECITA CROOKS     PROCEDURE DATE:  08/11/2023          INTERPRETATION:  Two examinations were performed and are reported   together:  1. MR thoracic spine with and without gadolinium  2. MR lumbar spine with and without gadolinium    CLINICAL INFORMATION:   Cord compression  ADDITIONAL CLINICAL INFORMATION:   Cancer C80.1    TECHNIQUE (each examination):   Sagittal STIR images, sagittal   T2-weighted images T2-weighted and sagittal T1-weighted images were   obtained.   Axial T1 and T2 weighted images were obtained.  Following   gadolinium administration axial and sagittal fat-saturated T1-weighted   images were obtained.  Each spine segment was obtained as separate   acquisitions.    COMPARISON :   CT chest and CT lumbar 8/7/2023 available for review.    FINDINGS:    THORACIC SPINE:    Thoracic vertebral alignment demonstrates mild exaggeration in the   midthoracic kyphosis. Thoracic vertebral body heights are maintained.   Marrow signal intensity within thoracic vertebral bodies is significant   for focal marrow lesion within the T10 vertebral body. This demonstrates   focal marrow replacement with lesion hypointense on the T1-weighted   images and mixed hyperintense on the long TR images also noting adjacent   marrow edema pattern. With gadolinium administration focal lesion   enhances with more mild generalized enhancement within the vertebral   body. A second noncontiguous lesion involves its left transverse   processes with similar marrow replacement and enhancement. Third smaller   lesion involves the left T7 transverse process. Small focal lesion is   found within T11 near its superior endplate with similar imaging   properties.. No osseous expansion, epidural disease or paraspinal   abnormality is found.    Thoracic intervertebral discs demonstrate mild degeneration with disc   height loss and signal intensity loss on the long TR images of the mid   thoracic intervertebral disc levels. No central canal compromise is   recognized. Thoracic neural foramina appear patent. No pathologic disc   space enhancement is noted.    The thoracic cord maintains intact morphology. No cord signal intensity   change is seen. No intrinsic cord lesion is found. No cord expansion or   cord volume loss is found. No pathologic enhancement is found within the   thoracic canal.    Pulmonary metastases and pathologic mediastinal lymph nodes are evident.    LUMBAR SPINE:    OSSEOUS STRUCTURES AND ALIGNMENT:  Lumbar vertebral alignment is preserved.   Lumbar vertebral body heights   are maintained.  Marrow signal intensity within lumbar vertebra is   significant for focal marrow lesion within the L5 vertebral body. This   lesion is low signal intensity on T1-weighted and T2-weighted images,   nearing conspicuous on STIR imaging and with faint enhancement following   gadolinium administration. There is no associated marrow edema.   No   osseous expansion or epidural disease is identified. No pathologic   vertebral enhancement is found.  SACRUM/SACROILIAC JOINTS/VISUALIZED PELVIC BONES:  The sacrum is significant for focal marrow replacement within the S2   vertebral body. Visualized pelvic bones appear intact.  The sacroiliac   joints are incompletely visualized, as seen intact.    PARASPINAL SOFT TISSUES:    No lesion is identified.  VISUALIZED ABDOMINAL AND PELVIC SOFT TISSUES:   No lesion is identified.    LUMBAR INTERVERTEBRAL DISC LEVELS:    General comments: The higher lumbar intervertebral discs T12-L1 through   L3-L4 maintain preserved height and signal intensity. The lowest 3 disc   levels demonstrate mild degenerative signal intensity loss on the long TR   images. No pathologic disc space enhancement is found.    T12-L1:   No central canal or foraminal stenosis.  L1-L2:    No central canal or foraminal stenosis.  L2-L3:    No central canal or foraminal stenosis.  L3-L4:    No central canal or foraminal stenosis.  L4-L5:    No central canal stenosis. Diffuse disc bulging and facet   arthropathy contribute to low-grade foraminal compromise left greater   than right  L5-S1:   No central canal stenosis. Diffuse disc bulging and facet   arthropathy contribute to low-grade foraminal compromise.    CNS STRUCTURES:  The distal cord maintains intact morphology and signal intensity.  The   conus is  normally positioned at L1.   Nerve roots of the cauda equina   appear intact.   No nerve root nodularity, clumping or dural adherence is   recognized.    OTHER:      IMPRESSION:    1. THORACIC SPINE:   Multiple thoracic vertebral metastases include   within the T10 and T11 vertebral bodies and the T7 and T10 left   transverse processes. No thoracic canal compromise. Thoracic cord contact    2. LUMBAR SPINE:   L5 vertebral lesion is indeterminate, vertebral   metastasis versus hemangioma bone with atypical imaging properties.  S2   metastasis. No lumbar canal compromise. Conus and cauda equina are intact    3. Pulmonary metastases. Mediastinal lymph node metastases    --- End of Report ---

## 2023-08-11 NOTE — CONSULT NOTE ADULT - NS ATTEND AMEND GEN_ALL_CORE FT
No pain, no shortness of breath    Review of systems: All 10 points ROS was obtained except as above.     acetaminophen     Tablet .. 650 milliGRAM(s) Oral every 6 hours PRN  cefTRIAXone   IVPB      dextrose 5%. 1000 milliLiter(s) IV Continuous <Continuous>  dextrose 5%. 1000 milliLiter(s) IV Continuous <Continuous>  dextrose 50% Injectable 12.5 Gram(s) IV Push once  dextrose 50% Injectable 25 Gram(s) IV Push once  dextrose 50% Injectable 25 Gram(s) IV Push once  dextrose Oral Gel 15 Gram(s) Oral once PRN  glucagon  Injectable 1 milliGRAM(s) IntraMuscular once  heparin   Injectable 5000 Unit(s) SubCutaneous every 12 hours  insulin glargine Injectable (LANTUS) 8 Unit(s) SubCutaneous at bedtime  insulin lispro (ADMELOG) corrective regimen sliding scale   SubCutaneous at bedtime  insulin lispro (ADMELOG) corrective regimen sliding scale   SubCutaneous three times a day before meals  ketoconazole 2% Cream 1 Application(s) Topical two times a day  morphine  - Injectable 2 milliGRAM(s) IV Push every 6 hours PRN  oxyCODONE    IR 5 milliGRAM(s) Oral every 6 hours PRN  polyethylene glycol 3350 17 Gram(s) Oral daily  senna 2 Tablet(s) Oral at bedtime  sodium chloride 1 Gram(s) Oral three times a day  tiotropium 2.5 MICROgram(s) Inhaler 2 Puff(s) Inhalation daily      VITAL:  T(C): , Max: 36.9 (08-10-23 @ 21:00)  T(F): , Max: 98.4 (08-10-23 @ 21:00)  HR: 107 (08-11-23 @ 17:00)  BP: 117/60 (08-11-23 @ 17:00)  BP(mean): --  RR: 18 (08-11-23 @ 17:00)  SpO2: 97% (08-11-23 @ 17:00)  Wt(kg): --      PHYSICAL EXAM:  General: NAD; Alert  HEENT:  NCAT; PERRLA  Neck: No JVD; supple  Respiratory: CTA-b/l  Cardiac: RRR s1s2  Gastrointestinal: BS+, soft, NT/ND  Urologic: No alicea  Extremities: No peripheral edema  Access:     LABS:                          10.7   13.88 )-----------( 667      ( 11 Aug 2023 06:23 )             33.1     Na(127)/K(4.2)/Cl(92)/HCO3(27)/BUN(9)/Cr(0.54)Glu(178)/Ca(10.0)/Mg(2.00)/PO4(3.3)    08-11 @ 06:23  Na(133)/K(4.5)/Cl(95)/HCO3(27)/BUN(10)/Cr(0.60)Glu(178)/Ca(9.8)/Mg(2.10)/PO4(2.9)    08-09 @ 06:20    Urinalysis Basic - ( 11 Aug 2023 06:23 )    Color: x / Appearance: x / SG: x / pH: x  Gluc: 178 mg/dL / Ketone: x  / Bili: x / Urobili: x   Blood: x / Protein: x / Nitrite: x   Leuk Esterase: x / RBC: x / WBC x   Sq Epi: x / Non Sq Epi: x / Bacteria: x            ASSESSMENT/PLAN    Hyponatremia  Check Urine Na, urine osm, serum osm  TSH, uric acid and free cortisol.   Continue to monitor.       Anemia  Due to CA  defer to hematology oncology.
As above.    67 y/o female with metastatic large cell neuroendocrine tumor, on active chemotherapy at [facility], admitted with dyspnea, cough. Imaging showed extensive pulmonary metastases. Osseous disease noted. Will have imaging compared at [facility]. Wean oxygen as tolerated. Consider pulm eval
Pt seen on 8/10/2023 via telehealth with PA.  PT on O2, h/o lung CA found on CT chest to have T10 lytic lesion.  Pt was alert, awake in NAD.  POST with good strength in the LE.  Pt underwent bone scan which showed uptake in mid thoracis spine as well as L5 in addition to other bony locations, suspicious for multiple bone mets.  Pe is pending MRI c-/c+ thoracic spine.  Continue management as per primary team.

## 2023-08-11 NOTE — PROGRESS NOTE ADULT - ASSESSMENT
This is a 68 year old female with stage IV large cell neuroendocrine cancer who presents with dyspnea and cough.    1. Stage IV large cell neuroendocrine cancer   -- Involving lungs, bones, and lymph nodes   -- Currently on treatment with pemetrexed and pembrolizumab (LD 07/21)   -- No systemic treatment while admitted  -- [place] up with Dr. Rodriguez [place] [facility]     2. Shortness of breath   -- Secondary to disease burden   -- CTA chest w/o PE; extensive pulm mets and mediastinal/hilar lymphadenopathy; T10 lytic lesion; extrinsic compression of RLL bronchus   -- Pulmonary consulted, appreciate recommendations- interventional pulm eval for bronchoscopy and possible debulking vs endobronchial stent   -- On abx for post-obstructive pneumonia   -- PET/CT from 07/17 with right hilar mass, numerous pulmonary mets, thoracic and mediastinal lymphadenopathy, pleural mets, and osseous mets (L5, left hemisacrum, left anterior 6th rib, right prox humeral shaft, left posterior arch of C1)   -- Continue supplemental O2 as needed, wean as tolerated     3. Osseous mets   -- Neurosurgery consulted for T10 lytic lesion  -- Bone scan with findings suspicious for osseous mets involving mid thoracic spine, L5, midshaft of R femur; metastatic disease vs post-traumatic changes in L mid and R lower ribs   -- MRI T/L spine with multiple thoracic vertebral mets within T10-T11, and T7 and T10 transverse processes; indeterminate L5 vertebral lesion; no evidence of cord compromise   -- Ortho consulted, R femur lesion not concerning for mets  -- Continue pain control     4. Hyponatremia   -- Likely SIADH   -- Nephrology consulted, appreciate recs     Will continue to follow.    Adelaide Sampson PA-C  Hematology/Oncology  [facility]  [phone] (office)  [phone] (alt office)  Evenings and weekends please call MD on call or office

## 2023-08-11 NOTE — PROGRESS NOTE ADULT - ASSESSMENT
68 year old female with stage IV large cell neuroendocrine cancer on chemotherapy, presents to the ER with dyspnea and cough, found to have extrinsic compression of RLL and pulm consulted for further recommendations.    #Stage IV large cell neuroendocrine cancer   - currently on treatment with pemetrexed and pembrolizumab q 3 weeks (last on 07/2021)  - CT scan shows extensive metastatic disease as well as RLL compression from extrinsic compression of hilar mass (and possibly endobronchial lesion?) with RLL atelectasis  - per heme note, PET/CT from 07/17 showing right hilar mass, numerous pulmonary mets, thoracic and mediastinal lymphadenopathy, pleural mets, and osseous mets (L5, left hemisacrum, left anterior 6th rib, right prox humeral shaft, left posterior arch of C1  - unclear if RLL collapse is new?  - chest pain over right hemithorax is likely secondary to pleural irritation    Recommendations:  - patient with SOB in the setting of pulm mets and RLL collapse  - given immunocompromised state and elevate wbc ount with RLL collapse; concern for post obstructive PNA and would treat with abx  - would be helpful to [place] [facility] obtain if possible  - patient with extensive emphysema and likely has COPD (never been formally diagnosed); no wheezing on exam; can start spiriva inpatient    Discussed case with interventional pulmonary attending and can offer bronchoscopic evaluation next week (likely Tuesday). Will determine stent placement during procedure as cannot tell from current CT if patient with endobronchial lesion as well.

## 2023-08-11 NOTE — CONSULT NOTE ADULT - ASSESSMENT
___________________________  ASSESSMENT AND PLAN  # Longitudinal melanonychia, numerous nails but most prominent lesion on the R first digit   ddx: favor melanocyte activation vs trauma vs familial or a combination of the three >> melanoma / metastatic disease     #Tinea pedis w/ onychomycosis     Recommendations:   - Start ketoconazole 2% cream BID for soles of feet/ interdigital spaces   - Would favor monitoring this lesion at fu and then can consider bx if desired. Bx would entail avulsing the nail plate for nail matrix bx. This procedure is preformed by our dermatologic surgeons and is not feasible to perform while pt is admitted.   - Pt can fu with dermatology with Dr. Coreas in 1-2 months.    Patient can follow up with us in the [facility] located at [address] [place] [address]. [address] [address] upon discharge. Please instruct patient to call our office. Office phone number is [phone]. Please instruct patient to notify scheduling team they are scheduling a hospital follow-up for an expedited appointment.    The patient's chart was reviewed in addition to being seen and examined at bedside with the dermatology attending Dr. Coreas.  Recommendations were communicated with the primary team.  Please page [phone] with a 10-digit call-back number for further related questions.    Renea Campoverde MD  Resident Physician, PGY-3  [facility]  Pager: [phone]  Office: [phone] ___________________________  ASSESSMENT AND PLAN  # Longitudinal melanonychia, numerous nails but most prominent lesion on the R first digit   ddx: favor melanocyte activation/trauma vs familial with component of onychomycosis>> melanoma / metastatic disease     #Tinea pedis w/ onychomycosis     Recommendations:   - Start ketoconazole 2% cream BID for soles of feet/ interdigital spaces   - Would favor monitoring this lesion at fu and then can consider bx if desired. Bx would entail avulsing the nail plate for nail matrix bx. This procedure is preformed by our dermatologic surgeons and is not feasible to perform while pt is admitted.   - Pt can fu with in our outpatient dermatology clinic 1-2 months.    Patient can follow up with us in the [facility] located at [address] [place] [address]. [address] [address] upon discharge. Please instruct patient to call our office. Office phone number is [phone]. Please instruct patient to notify scheduling team they are scheduling a hospital follow-up for an expedited appointment.    The patient's chart was reviewed in addition to being seen and examined at bedside with the dermatology attending Dr. Coreas.  Recommendations were communicated with the primary team.  Please page [phone] with a 10-digit call-back number for further related questions.    Renea Campoverde MD  Resident Physician, PGY-3  [facility]  Pager: [phone]  Office: [phone]

## 2023-08-11 NOTE — CONSULT NOTE ADULT - CONSULT REASON
nail finding
Concern for possible lesion right femur
lung adenocarcinoma
stent evaluation
hyponatremia
T10 lytic lesion

## 2023-08-11 NOTE — PROGRESS NOTE ADULT - SUBJECTIVE AND OBJECTIVE BOX
Date of Service  : 08-11-23 @ 13:07    INTERVAL HPI/OVERNIGHT EVENTS: My right lower rib hurting.   Vital Signs Last 24 Hrs  T(C): 36.8 (11 Aug 2023 05:00), Max: 36.9 (10 Aug 2023 21:00)  T(F): 98.2 (11 Aug 2023 05:00), Max: 98.4 (10 Aug 2023 21:00)  HR: 104 (11 Aug 2023 05:00) (100 - 122)  BP: 103/68 (11 Aug 2023 05:00) (103/68 - 122/70)  BP(mean): --  RR: 17 (11 Aug 2023 05:00) (17 - 20)  SpO2: 98% (11 Aug 2023 05:00) (96% - 100%)    Parameters below as of 11 Aug 2023 05:00  Patient On (Oxygen Delivery Method): nasal cannula  O2 Flow (L/min): 2    I&O's Summary    MEDICATIONS  (STANDING):  cefTRIAXone   IVPB      dextrose 5%. 1000 milliLiter(s) (50 mL/Hr) IV Continuous <Continuous>  dextrose 5%. 1000 milliLiter(s) (100 mL/Hr) IV Continuous <Continuous>  dextrose 50% Injectable 25 Gram(s) IV Push once  dextrose 50% Injectable 12.5 Gram(s) IV Push once  dextrose 50% Injectable 25 Gram(s) IV Push once  glucagon  Injectable 1 milliGRAM(s) IntraMuscular once  heparin   Injectable 5000 Unit(s) SubCutaneous every 12 hours  insulin glargine Injectable (LANTUS) 8 Unit(s) SubCutaneous at bedtime  insulin lispro (ADMELOG) corrective regimen sliding scale   SubCutaneous three times a day before meals  insulin lispro (ADMELOG) corrective regimen sliding scale   SubCutaneous at bedtime  polyethylene glycol 3350 17 Gram(s) Oral daily  senna 2 Tablet(s) Oral at bedtime  sodium chloride 1 Gram(s) Oral three times a day  tiotropium 2.5 MICROgram(s) Inhaler 2 Puff(s) Inhalation daily    MEDICATIONS  (PRN):  acetaminophen     Tablet .. 650 milliGRAM(s) Oral every 6 hours PRN Temp greater or equal to 38C (100.4F), Mild Pain (1 - 3)  dextrose Oral Gel 15 Gram(s) Oral once PRN Blood Glucose LESS THAN 70 milliGRAM(s)/deciliter  morphine  - Injectable 2 milliGRAM(s) IV Push every 6 hours PRN Severe Pain (7 - 10)  oxyCODONE    IR 5 milliGRAM(s) Oral every 6 hours PRN Moderate Pain (4 - 6)    LABS:                        10.7   13.88 )-----------( 667      ( 11 Aug 2023 06:23 )             33.1     08-11    127<L>  |  92<L>  |  9   ----------------------------<  178<H>  4.2   |  27  |  0.54    Ca    10.0      11 Aug 2023 06:23  Phos  3.3     08-11  Mg     2.00     08-11    TPro  7.8  /  Alb  3.1<L>  /  TBili  0.4  /  DBili  x   /  AST  29  /  ALT  14  /  AlkPhos  71  08-11      Urinalysis Basic - ( 11 Aug 2023 06:23 )    Color: x / Appearance: x / SG: x / pH: x  Gluc: 178 mg/dL / Ketone: x  / Bili: x / Urobili: x   Blood: x / Protein: x / Nitrite: x   Leuk Esterase: x / RBC: x / WBC x   Sq Epi: x / Non Sq Epi: x / Bacteria: x      CAPILLARY BLOOD GLUCOSE      POCT Blood Glucose.: 209 mg/dL (11 Aug 2023 11:42)  POCT Blood Glucose.: 130 mg/dL (10 Aug 2023 23:25)  POCT Blood Glucose.: 136 mg/dL (10 Aug 2023 15:58)        Urinalysis Basic - ( 11 Aug 2023 06:23 )    Color: x / Appearance: x / SG: x / pH: x  Gluc: 178 mg/dL / Ketone: x  / Bili: x / Urobili: x   Blood: x / Protein: x / Nitrite: x   Leuk Esterase: x / RBC: x / WBC x   Sq Epi: x / Non Sq Epi: x / Bacteria: x      REVIEW OF SYSTEMS:  CONSTITUTIONAL: No fever, weight loss, or fatigue  EYES: No eye pain, visual disturbances, or discharge  ENMT:  No difficulty hearing, tinnitus, vertigo; No sinus or throat pain  NECK: No pain or stiffness  RESPIRATORY: No cough, wheezing, chills or hemoptysis; No shortness of breath  CARDIOVASCULAR: No chest pain, palpitations, dizziness, or leg swelling  GASTROINTESTINAL: No abdominal or epigastric pain. No nausea, vomiting, or hematemesis; No diarrhea or constipation. No melena or hematochezia.  GENITOURINARY: No dysuria, frequency, hematuria, or incontinence  NEUROLOGICAL: No headaches, memory loss, loss of strength, numbness, or tremors      Consultant(s) Notes Reviewed:  [x ] YES  [ ] NO    PHYSICAL EXAM:  GENERAL: NAD, Oxygen NC   HEAD:  Atraumatic, Normocephalic  EYES: EOMI, PERRLA, conjunctiva and sclera clear  ENMT: No tonsillar erythema, exudates, or enlargement; Moist mucous membranes, Good dentition, No lesions  NECK: Supple, No JVD, Normal thyroid  NERVOUS SYSTEM:  Alert & Oriented X3, No focal deficit   CHEST/LUNG: Good air entry except right base with tenderness rib.   HEART: Regular rate and rhythm; No murmurs, rubs, or gallops,  ABDOMEN: Soft, Nontender, Nondistended; Bowel sounds present  EXTREMITIES:  2+ Peripheral Pulses, No clubbing, cyanosis, or edema  SKIN: Discoloration right thumb .     Care Discussed with Consultants/Other Providers [ x] YES  [ ] NO

## 2023-08-12 NOTE — PROGRESS NOTE ADULT - ASSESSMENT
67 yo female with PMH lung ca, DM 2 recently diagnosed admitted with evaluation of shortness of breath and cough clinical presentation consistent with worsening disease burden, no pneumonia or PE on CTA       Problem/Plan - 1:  ·  Problem: Cancer of lung.   ·  Plan:   likely secondary to extensive disease burden and extrinsic tumor compression of right bronchus  continue O2 as needed  oncology consultation appreciated   patient has stage 4 large cell neuroendocrine tumor.  Dermatology consult noted.      Problem/Plan - 2:  ·  Problem: Metastatic Lesion of thoracic vertebra.   ·  Plan:   CT Lumbar and cervical spine noted  bone scan positive for lesion L5 mid thoracic and right femur shaft   ortho oncology consultation devaughn by me  X ray right femur  spine ortho follow up     < from: MR Lumbar Spine w/wo IV Cont (08.11.23 @ 10:43) >  IMPRESSION:    1. THORACIC SPINE:   Multiple thoracic vertebral metastases include   within the T10 and T11 vertebral bodies and the T7 and T10 left   transverse processes. No thoracic canal compromise. Thoracic cord contact    2. LUMBAR SPINE:   L5 vertebral lesion is indeterminate, vertebral   metastasis versus hemangioma bone with atypical imaging properties.  S2   metastasis. No lumbar canal compromise. Conus and cauda equina are intact    3. Pulmonarymetastases. Mediastinal lymph node metastases    Management per oncology .      Problem/Plan - 3:  ·  Problem: Post Obstruction Pneumonia with acute respiratory failure with hypoxia with COPD  .   ·  Plan: likely secondary to malignancy . May need stent .  Abxs per Pulmonary .  < from: CT Angio Chest PE Protocol w/ IV Cont (08.07.23 @ 05:01) >  IMPRESSION:  No pulmonary embolism.    Extensive bilateral pulmonary metastases and confluent mediastinal/hilar   adenopathy. Likely lymphangitic spread of tumor. Lytic lesion in the T10   vertebral body suspicious for bony metastasis. Nonspecific left adrenal   gland thickening. Extrinsic compression on right lower lobe bronchus with   right lower lobe subsegmental atelectasis.  Findings are concerning for   bronchogenic malignancy.  Consider small cell carcinoma.    < end of copied text >     Problem/Plan - 4:  ·  Problem: DM (diabetes mellitus).   ·  Plan: finger sticks with short acting insulin sliding scale  will continue Lantus  8 units.

## 2023-08-12 NOTE — PHYSICAL THERAPY INITIAL EVALUATION ADULT - PATIENT PROFILE REVIEW, REHAB EVAL
PT initial evaluation received and chart review completed. Pt agreeable to participate in PT evaluation. Pt cleared by ANNA Lakhani./yes

## 2023-08-12 NOTE — PHYSICAL THERAPY INITIAL EVALUATION ADULT - PERTINENT HX OF CURRENT PROBLEM, REHAB EVAL
Pt is a 68 year old female presenting with SOB and cough. Likely secondary to extensive disease burden and extrinsic tumor compression of right bronchus. MRI thoracic and lumbar significant for multiple thoracic vertebral metastases include within the T10 and T11 vertebral bodies and the T7 and T10 left transverse processes, L5 vertebral lesion is indeterminate, vertebral metastasis versus hemangioma bone with atypical imaging properties, S2 metastasis, and pulmonary metastases. Right femur XR negative for acute findings. NM bone imaging significant for abnormal bone scan suspicious for osseous metastases in the mid thoracic spine, L5 and possibly midshaft of the right femur and metastatic disease versus posttraumatic changes in the left mid and right lower ribs.

## 2023-08-12 NOTE — PROGRESS NOTE ADULT - ASSESSMENT
This is a 68 year old female with stage IV large cell neuroendocrine cancer who presents with dyspnea and cough.    1. Stage IV large cell neuroendocrine cancer   -- Involving lungs, bones, and lymph nodes   -- Currently on treatment with pemetrexed and pembrolizumab (LD 07/21)   -- No systemic treatment while admitted  -- Follow up with Dr. Rodriguez at [facility] after discharge     2. Shortness of breath   -- Secondary to disease burden   -- CTA chest w/o PE; extensive pulm mets and mediastinal/hilar lymphadenopathy; T10 lytic lesion; extrinsic compression of RLL bronchus   -- Pulmonary consulted, appreciate recommendations- interventional pulm eval for bronchoscopy and possible debulking vs endobronchial stent   -- On abx for post-obstructive pneumonia, 2L NC  -- PET/CT from 07/17 with right hilar mass, numerous pulmonary mets, thoracic and mediastinal lymphadenopathy, pleural mets, and osseous mets (L5, left hemisacrum, left anterior 6th rib, right prox humeral shaft, left posterior arch of C1)   -- Continue supplemental O2 as needed, wean as tolerated     3. Osseous mets   -- Neurosurgery consulted for T10 lytic lesion  -- Bone scan with findings suspicious for osseous mets involving mid thoracic spine, L5, midshaft of R femur; metastatic disease vs post-traumatic changes in L mid and R lower ribs   -- MRI T/L spine with multiple thoracic vertebral mets within T10-T11, and T7 and T10 transverse processes; indeterminate L5 vertebral lesion; no evidence of cord compromise   -- Ortho consulted, R femur lesion not concerning for mets  -- Continue pain control     4. Hyponatremia   -- Likely SIADH   -- Nephrology consulted, appreciate recs     Will continue to follow.    Renea Robb NP  Hematology/ Oncology  [facility]  [phone] (office)  [phone] (alt office)  Evenings and weekends please call MD on call or office

## 2023-08-12 NOTE — PROGRESS NOTE ADULT - SUBJECTIVE AND OBJECTIVE BOX
[facility]   MD BASIA BOWLES PA    TEL:  OFFICE: [phone]    Between 17:00pm - 09:00am, please call answering service at [phone].        --RENAL PROGRESS NOTE-- Date Of service: 8/12/2023 @1439pm    Patient is a 68y old  Female who presents with a chief complaint of shortness of breath (12 Aug 2023 13:00)      Patient seen and examined at bedside. No chest pain/sob.    VITALS:  T(F): 97.9 (08-12-23 @ 13:20), Max: 98.3 (08-11-23 @ 17:00)  HR: 99 (08-12-23 @ 13:20)  BP: 111/59 (08-12-23 @ 13:20)  RR: 17 (08-12-23 @ 13:20)  SpO2: 95% (08-12-23 @ 13:20)  Wt(kg): --        PHYSICAL EXAM:  Constitutional: NAD  Neck: No JVD  Respiratory: CTAB, no wheezes, rales or rhonchi  Cardiovascular: S1, S2, RRR  Gastrointestinal: BS+, soft, NT/ND  Extremities: No peripheral edema    Hospital Medications:   MEDICATIONS  (STANDING):  cefTRIAXone   IVPB 1000 milliGRAM(s) IV Intermittent every 24 hours  cefTRIAXone   IVPB      dextrose 5%. 1000 milliLiter(s) (50 mL/Hr) IV Continuous <Continuous>  dextrose 5%. 1000 milliLiter(s) (100 mL/Hr) IV Continuous <Continuous>  dextrose 50% Injectable 12.5 Gram(s) IV Push once  dextrose 50% Injectable 25 Gram(s) IV Push once  dextrose 50% Injectable 25 Gram(s) IV Push once  glucagon  Injectable 1 milliGRAM(s) IntraMuscular once  heparin   Injectable 5000 Unit(s) SubCutaneous every 12 hours  insulin glargine Injectable (LANTUS) 8 Unit(s) SubCutaneous at bedtime  insulin lispro (ADMELOG) corrective regimen sliding scale   SubCutaneous three times a day before meals  insulin lispro (ADMELOG) corrective regimen sliding scale   SubCutaneous at bedtime  ketoconazole 2% Cream 1 Application(s) Topical two times a day  polyethylene glycol 3350 17 Gram(s) Oral daily  senna 2 Tablet(s) Oral at bedtime  sodium chloride 1 Gram(s) Oral three times a day  tiotropium 2.5 MICROgram(s) Inhaler 2 Puff(s) Inhalation daily      LABS:  08-12    130<L>  |  94<L>  |  10  ----------------------------<  139<H>  4.4   |  25  |  0.57    Ca    10.3      12 Aug 2023 06:30  Phos  3.3     08-12  Mg     2.10     08-12    TPro  7.8  /  Alb  3.1<L>  /  TBili  0.4  /  DBili      /  AST  29  /  ALT  14  /  AlkPhos  71  08-11    Creatinine Trend: 0.57 <--, 0.54 <--, 0.60 <--, 0.50 <--, 0.50 <--, 0.56 <--    Phosphorus: 3.3 mg/dL (08-12 @ 06:30)                              10.3   15.64 )-----------( 665      ( 12 Aug 2023 06:30 )             32.4     Urine Studies:  Urinalysis - [08-12-23 @ 06:30]      Color  / Appearance  / SG  / pH       Gluc 139 / Ketone   / Bili  / Urobili        Blood  / Protein  / Leuk Est  / Nitrite       RBC  / WBC  / Hyaline  / Gran  / Sq Epi  / Non Sq Epi  / Bacteria       TSH 1.48      [08-07-23 @ 10:58]    HCV 0.13, Nonreact      [08-08-23 @ 05:15]      RADIOLOGY & ADDITIONAL STUDIES:

## 2023-08-12 NOTE — PHYSICAL THERAPY INITIAL EVALUATION ADULT - GAIT PATTERN USED, PT EVAL
Post-Care Instructions: I reviewed with the patient in detail post-care instructions. Patient is to wear sunprotection, and avoid picking at any of the treated lesions. Pt may apply Vaseline to crusted or scabbing areas.
Detail Level: Detailed
Number Of Freeze-Thaw Cycles: 1 freeze-thaw cycle
Render Post-Care Instructions In Note?: yes
Consent: The patient's consent was obtained including but not limited to risks of crusting, scabbing, blistering, scarring, darker or lighter pigmentary change, recurrence, incomplete removal and infection.
Render Note In Bullet Format When Appropriate: No
Duration Of Freeze Thaw-Cycle (Seconds): 0
2-point gait

## 2023-08-12 NOTE — PHYSICAL THERAPY INITIAL EVALUATION ADULT - GENERAL OBSERVATIONS, REHAB EVAL
Upon entry, pt semi-supine in bed in NAD; + telemetry, + nasal cannula.  bpm SpO2 99% on 2L O2 via nasal cannula.

## 2023-08-12 NOTE — PROGRESS NOTE ADULT - ASSESSMENT
69 yo female with PMH lung Ca on CT (last CT 3 weeks ago), DM for evaluation of shortness of breath and cough x 1 week, acutely worsened yesterday after walking a lot visiting the statue of [place]. Nephrology consulted for hyponatremia.     Hyponatremia  Improving  Pt denied hx of known hyponatremia however Na has been low since admission  Likely SIADH.  Free water restriction <1L/day pt educated  Continue NaCl 1g TID x2 days  monitor  Pending collection of urine Na and osmo  avoid overcorrection, na should not correct >8meq in 24 hrs

## 2023-08-12 NOTE — PROGRESS NOTE ADULT - SUBJECTIVE AND OBJECTIVE BOX
Date of Service  : 08-12-23     INTERVAL HPI/OVERNIGHT EVENTS: I feel better.   Vital Signs Last 24 Hrs  T(C): 36.7 (12 Aug 2023 09:20), Max: 36.8 (11 Aug 2023 17:00)  T(F): 98 (12 Aug 2023 09:20), Max: 98.3 (11 Aug 2023 17:00)  HR: 95 (12 Aug 2023 09:20) (95 - 107)  BP: 111/58 (12 Aug 2023 09:20) (105/58 - 117/60)  BP(mean): --  RR: 18 (12 Aug 2023 09:20) (17 - 18)  SpO2: 96% (12 Aug 2023 09:20) (96% - 99%)    Parameters below as of 12 Aug 2023 09:20  Patient On (Oxygen Delivery Method): nasal cannula  O2 Flow (L/min): 2    I&O's Summary    MEDICATIONS  (STANDING):  cefTRIAXone   IVPB      cefTRIAXone   IVPB 1000 milliGRAM(s) IV Intermittent every 24 hours  dextrose 5%. 1000 milliLiter(s) (50 mL/Hr) IV Continuous <Continuous>  dextrose 5%. 1000 milliLiter(s) (100 mL/Hr) IV Continuous <Continuous>  dextrose 50% Injectable 25 Gram(s) IV Push once  dextrose 50% Injectable 25 Gram(s) IV Push once  dextrose 50% Injectable 12.5 Gram(s) IV Push once  glucagon  Injectable 1 milliGRAM(s) IntraMuscular once  heparin   Injectable 5000 Unit(s) SubCutaneous every 12 hours  insulin glargine Injectable (LANTUS) 8 Unit(s) SubCutaneous at bedtime  insulin lispro (ADMELOG) corrective regimen sliding scale   SubCutaneous three times a day before meals  insulin lispro (ADMELOG) corrective regimen sliding scale   SubCutaneous at bedtime  ketoconazole 2% Cream 1 Application(s) Topical two times a day  polyethylene glycol 3350 17 Gram(s) Oral daily  senna 2 Tablet(s) Oral at bedtime  sodium chloride 1 Gram(s) Oral three times a day  tiotropium 2.5 MICROgram(s) Inhaler 2 Puff(s) Inhalation daily    MEDICATIONS  (PRN):  acetaminophen     Tablet .. 650 milliGRAM(s) Oral every 6 hours PRN Temp greater or equal to 38C (100.4F), Mild Pain (1 - 3)  dextrose Oral Gel 15 Gram(s) Oral once PRN Blood Glucose LESS THAN 70 milliGRAM(s)/deciliter  morphine  - Injectable 2 milliGRAM(s) IV Push every 6 hours PRN Severe Pain (7 - 10)  oxyCODONE    IR 5 milliGRAM(s) Oral every 6 hours PRN Moderate Pain (4 - 6)    LABS:                        10.3   15.64 )-----------( 665      ( 12 Aug 2023 06:30 )             32.4     08-12    130<L>  |  94<L>  |  10  ----------------------------<  139<H>  4.4   |  25  |  0.57    Ca    10.3      12 Aug 2023 06:30  Phos  3.3     08-12  Mg     2.10     08-12    TPro  7.8  /  Alb  3.1<L>  /  TBili  0.4  /  DBili  x   /  AST  29  /  ALT  14  /  AlkPhos  71  08-11      Urinalysis Basic - ( 12 Aug 2023 06:30 )    Color: x / Appearance: x / SG: x / pH: x  Gluc: 139 mg/dL / Ketone: x  / Bili: x / Urobili: x   Blood: x / Protein: x / Nitrite: x   Leuk Esterase: x / RBC: x / WBC x   Sq Epi: x / Non Sq Epi: x / Bacteria: x      CAPILLARY BLOOD GLUCOSE      POCT Blood Glucose.: 227 mg/dL (12 Aug 2023 12:41)  POCT Blood Glucose.: 144 mg/dL (12 Aug 2023 08:41)  POCT Blood Glucose.: 167 mg/dL (11 Aug 2023 21:28)  POCT Blood Glucose.: 161 mg/dL (11 Aug 2023 18:31)  POCT Blood Glucose.: 157 mg/dL (11 Aug 2023 17:13)  POCT Blood Glucose.: 246 mg/dL (11 Aug 2023 13:24)        Urinalysis Basic - ( 12 Aug 2023 06:30 )    Color: x / Appearance: x / SG: x / pH: x  Gluc: 139 mg/dL / Ketone: x  / Bili: x / Urobili: x   Blood: x / Protein: x / Nitrite: x   Leuk Esterase: x / RBC: x / WBC x   Sq Epi: x / Non Sq Epi: x / Bacteria: x      REVIEW OF SYSTEMS:  CONSTITUTIONAL: No fever, weight loss, or fatigue  EYES: No eye pain, visual disturbances, or discharge  ENMT:  No difficulty hearing, tinnitus, vertigo; No sinus or throat pain  NECK: No pain or stiffness  RESPIRATORY: No cough, wheezing, chills or hemoptysis; No shortness of breath  CARDIOVASCULAR: No chest pain, palpitations, dizziness, or leg swelling  GASTROINTESTINAL: No abdominal or epigastric pain. No nausea, vomiting, or hematemesis; No diarrhea or constipation. No melena or hematochezia.  GENITOURINARY: No dysuria, frequency, hematuria, or incontinence  NEUROLOGICAL: No headaches, memory loss, loss of strength, numbness, or tremors      Consultant(s) Notes Reviewed:  [x ] YES  [ ] NO    PHYSICAL EXAM:  GENERAL: NAD, well-groomed, well-developed,not in any distress ,  HEAD:  Atraumatic, Normocephalic  NECK: Supple, No JVD, Normal thyroid  NERVOUS SYSTEM:  Alert & Oriented X3, No focal deficit   CHEST/LUNG: Good air entry bilateral with no  rales, rhonchi, wheezing, or rubs  HEART: Regular rate and rhythm; No murmurs, rubs, or gallops  ABDOMEN: Soft, Nontender, Nondistended; Bowel sounds present  EXTREMITIES:  2+ Peripheral Pulses, No clubbing, cyanosis, or edema    Care Discussed with Consultants/Other Providers [ x] YES  [ ] NO

## 2023-08-12 NOTE — PROGRESS NOTE ADULT - SUBJECTIVE AND OBJECTIVE BOX
Patient is a 68y old  Female who presents with a chief complaint of shortness of breath (11 Aug 2023 16:07)    Patient seen and examined this morning at bedside.   Patient appears comfortable in bed, reports improvement in SOB.  No acute overnight events noted.    MEDICATIONS  (STANDING):  cefTRIAXone   IVPB 1000 milliGRAM(s) IV Intermittent every 24 hours  cefTRIAXone   IVPB      dextrose 5%. 1000 milliLiter(s) (50 mL/Hr) IV Continuous <Continuous>  dextrose 5%. 1000 milliLiter(s) (100 mL/Hr) IV Continuous <Continuous>  dextrose 50% Injectable 25 Gram(s) IV Push once  dextrose 50% Injectable 12.5 Gram(s) IV Push once  dextrose 50% Injectable 25 Gram(s) IV Push once  glucagon  Injectable 1 milliGRAM(s) IntraMuscular once  heparin   Injectable 5000 Unit(s) SubCutaneous every 12 hours  insulin glargine Injectable (LANTUS) 8 Unit(s) SubCutaneous at bedtime  insulin lispro (ADMELOG) corrective regimen sliding scale   SubCutaneous at bedtime  insulin lispro (ADMELOG) corrective regimen sliding scale   SubCutaneous three times a day before meals  ketoconazole 2% Cream 1 Application(s) Topical two times a day  polyethylene glycol 3350 17 Gram(s) Oral daily  senna 2 Tablet(s) Oral at bedtime  sodium chloride 1 Gram(s) Oral three times a day  tiotropium 2.5 MICROgram(s) Inhaler 2 Puff(s) Inhalation daily    MEDICATIONS  (PRN):  acetaminophen     Tablet .. 650 milliGRAM(s) Oral every 6 hours PRN Temp greater or equal to 38C (100.4F), Mild Pain (1 - 3)  dextrose Oral Gel 15 Gram(s) Oral once PRN Blood Glucose LESS THAN 70 milliGRAM(s)/deciliter  morphine  - Injectable 2 milliGRAM(s) IV Push every 6 hours PRN Severe Pain (7 - 10)  oxyCODONE    IR 5 milliGRAM(s) Oral every 6 hours PRN Moderate Pain (4 - 6)      ROS  No fever, sweats, chills  No epistaxis, HA, sore throat  No CP, SOB, cough, sputum  No n/v/d, abd pain, melena, hematochezia  No edema  No rash  No anxiety  No back pain, joint pain  No bleeding, bruising  No dysuria, hematuria    Vital Signs Last 24 Hrs  T(C): 36.7 (12 Aug 2023 09:20), Max: 36.8 (11 Aug 2023 13:00)  T(F): 98 (12 Aug 2023 09:20), Max: 98.3 (11 Aug 2023 17:00)  HR: 95 (12 Aug 2023 09:20) (95 - 108)  BP: 111/58 (12 Aug 2023 09:20) (105/58 - 117/60)  BP(mean): --  RR: 18 (12 Aug 2023 09:20) (17 - 18)  SpO2: 96% (12 Aug 2023 09:20) (96% - 100%)    Parameters below as of 12 Aug 2023 09:20  Patient On (Oxygen Delivery Method): nasal cannula  O2 Flow (L/min): 2      PE  frail  Awake, alert  Anicteric, MMM  RRR, on tele  2L NC  Abd soft, NT, ND  No c/c/e                            10.3   15.64 )-----------( 665      ( 12 Aug 2023 06:30 )             32.4       08-12    130<L>  |  94<L>  |  10  ----------------------------<  139<H>  4.4   |  25  |  0.57    Ca    10.3      12 Aug 2023 06:30  Phos  3.3     08-12  Mg     2.10     08-12    TPro  7.8  /  Alb  3.1<L>  /  TBili  0.4  /  DBili  x   /  AST  29  /  ALT  14  /  AlkPhos  71  08-11

## 2023-08-13 NOTE — PROGRESS NOTE ADULT - SUBJECTIVE AND OBJECTIVE BOX
[facility]   MD BASIA BOWLES MD KRISTINE SOLTANPOUR, DO ANGELA WONG, PA    TEL:  OFFICE: [phone]  From 5pm-7am Answering Service [phone]    -- RENAL FOLLOW UP NOTE ---Date of Service 08-13-23 @ 14:17    Patient is a 68y old  Female who presents with a chief complaint of shortness of breath (13 Aug 2023 11:31)      Patient seen and examined at bedside. No chest pain/sob    VITALS:  T(F): 97.6 (08-13-23 @ 12:00), Max: 98.6 (08-12-23 @ 21:45)  HR: 96 (08-13-23 @ 12:00)  BP: 117/60 (08-13-23 @ 12:00)  RR: 17 (08-13-23 @ 12:00)  SpO2: 100% (08-13-23 @ 12:00)  Wt(kg): --        PHYSICAL EXAM:  General: NAD  Neck: No JVD  Respiratory: CTAB, no wheezes, rales or rhonchi  Cardiovascular: S1, S2, RRR  Gastrointestinal: BS+, soft, NT/ND  Extremities: No peripheral edema    Hospital Medications:   MEDICATIONS  (STANDING):  cefTRIAXone   IVPB      cefTRIAXone   IVPB 1000 milliGRAM(s) IV Intermittent every 24 hours  dextrose 5%. 1000 milliLiter(s) (50 mL/Hr) IV Continuous <Continuous>  dextrose 5%. 1000 milliLiter(s) (100 mL/Hr) IV Continuous <Continuous>  dextrose 50% Injectable 12.5 Gram(s) IV Push once  dextrose 50% Injectable 25 Gram(s) IV Push once  dextrose 50% Injectable 25 Gram(s) IV Push once  glucagon  Injectable 1 milliGRAM(s) IntraMuscular once  heparin   Injectable 5000 Unit(s) SubCutaneous every 12 hours  insulin glargine Injectable (LANTUS) 8 Unit(s) SubCutaneous at bedtime  insulin lispro (ADMELOG) corrective regimen sliding scale   SubCutaneous at bedtime  insulin lispro (ADMELOG) corrective regimen sliding scale   SubCutaneous three times a day before meals  ketoconazole 2% Cream 1 Application(s) Topical two times a day  polyethylene glycol 3350 17 Gram(s) Oral daily  senna 2 Tablet(s) Oral at bedtime  tiotropium 2.5 MICROgram(s) Inhaler 2 Puff(s) Inhalation daily      LABS:  08-13    131<L>  |  93<L>  |  6<L>  ----------------------------<  120<H>  4.0   |  27  |  0.54    Ca    10.4      13 Aug 2023 07:50  Phos  3.1     08-13  Mg     2.00     08-13      Creatinine Trend: 0.54 <--, 0.57 <--, 0.54 <--, 0.60 <--, 0.50 <--, 0.50 <--, 0.56 <--    Phosphorus: 3.1 mg/dL (08-13 @ 07:50)                              11.1   13.18 )-----------( 667      ( 13 Aug 2023 07:50 )             34.7     Urine Studies:  Urinalysis - [08-13-23 @ 07:50]      Color  / Appearance  / SG  / pH       Gluc 120 / Ketone   / Bili  / Urobili        Blood  / Protein  / Leuk Est  / Nitrite       RBC  / WBC  / Hyaline  / Gran  / Sq Epi  / Non Sq Epi  / Bacteria     Urine Sodium 50      [08-13-23 @ 06:36]    TSH 1.48      [08-07-23 @ 10:58]    HCV 0.13, Nonreact      [08-08-23 @ 05:15]      RADIOLOGY & ADDITIONAL STUDIES:

## 2023-08-13 NOTE — PROGRESS NOTE ADULT - ASSESSMENT
This is a 68 year old female with stage IV large cell neuroendocrine cancer who presents with dyspnea and cough.    1. Stage IV large cell neuroendocrine cancer   -- Involving lungs, bones, and lymph nodes   -- Currently on treatment with pemetrexed and pembrolizumab (LD 07/21)   -- No systemic treatment while admitted  -- Follow up with Dr. Rodriguez at [facility] after discharge     2. Shortness of breath   -- Secondary to disease burden   -- CTA chest w/o PE; extensive pulm mets and mediastinal/hilar lymphadenopathy; T10 lytic lesion; extrinsic compression of RLL bronchus   -- Pulmonary consulted, appreciate recommendations- interventional pulm eval for bronchoscopy and possible debulking vs endobronchial stent   -- On abx for post-obstructive pneumonia, 2L NC  -- PET/CT from 07/17 with right hilar mass, numerous pulmonary mets, thoracic and mediastinal lymphadenopathy, pleural mets, and osseous mets (L5, left hemisacrum, left anterior 6th rib, right prox humeral shaft, left posterior arch of C1)   -- Continue supplemental O2 as needed, wean as tolerated     3. Osseous mets   -- Neurosurgery consulted for T10 lytic lesion  -- Bone scan with findings suspicious for osseous mets involving mid thoracic spine, L5, midshaft of R femur; metastatic disease vs post-traumatic changes in L mid and R lower ribs   -- MRI T/L spine with multiple thoracic vertebral mets within T10-T11, and T7 and T10 transverse processes; indeterminate L5 vertebral lesion; no evidence of cord compromise   -- Ortho consulted, R femur lesion not concerning for mets  -- Continue pain control     4. Hyponatremia   -- Likely SIADH   -- Nephrology consulted, appreciate recs     Will continue to follow.    Renea Robb NP  Hematology/ Oncology  [facility]  [phone] (office)  [phone] (alt office)  Evenings and weekends please call MD on call or office

## 2023-08-13 NOTE — PROGRESS NOTE ADULT - ASSESSMENT
69 yo female with PMH lung ca, DM 2 recently diagnosed admitted with evaluation of shortness of breath and cough clinical presentation consistent with worsening disease burden, no pneumonia or PE on CTA       Problem/Plan - 1:  ·  Problem: Cancer of lung.   ·  Plan:   likely secondary to extensive disease burden and extrinsic tumor compression of right bronchus  continue O2 as needed  oncology consultation appreciated   patient has stage 4 large cell neuroendocrine tumor.  Dermatology consult noted.      Problem/Plan - 2:  ·  Problem: Metastatic Lesion of thoracic vertebra.   ·  Plan:   CT Lumbar and cervical spine noted  bone scan positive for lesion L5 mid thoracic and right femur shaft   ortho oncology consultation devaughn by me  X ray right femur  spine ortho follow up     < from: MR Lumbar Spine w/wo IV Cont (08.11.23 @ 10:43) >  IMPRESSION:    1. THORACIC SPINE:   Multiple thoracic vertebral metastases include   within the T10 and T11 vertebral bodies and the T7 and T10 left   transverse processes. No thoracic canal compromise. Thoracic cord contact    2. LUMBAR SPINE:   L5 vertebral lesion is indeterminate, vertebral   metastasis versus hemangioma bone with atypical imaging properties.  S2   metastasis. No lumbar canal compromise. Conus and cauda equina are intact    3. Pulmonarymetastases. Mediastinal lymph node metastases    Management per oncology .      Problem/Plan - 3:  ·  Problem: Post Obstruction Pneumonia with acute respiratory failure with hypoxia with COPD  .   ·  Plan: likely secondary to malignancy . May need stent .  Abxs per Pulmonary .  < from: CT Angio Chest PE Protocol w/ IV Cont (08.07.23 @ 05:01) >  IMPRESSION:  No pulmonary embolism.    Extensive bilateral pulmonary metastases and confluent mediastinal/hilar   adenopathy. Likely lymphangitic spread of tumor. Lytic lesion in the T10   vertebral body suspicious for bony metastasis. Nonspecific left adrenal   gland thickening. Extrinsic compression on right lower lobe bronchus with   right lower lobe subsegmental atelectasis.  Findings are concerning for   bronchogenic malignancy.  Consider small cell carcinoma.    < end of copied text >     Problem/Plan - 4:  ·  Problem: DM (diabetes mellitus).   ·  Plan: finger sticks with short acting insulin sliding scale  will continue Lantus  8 units.

## 2023-08-13 NOTE — PROGRESS NOTE ADULT - SUBJECTIVE AND OBJECTIVE BOX
Patient is a 68y old  Female who presents with a chief complaint of shortness of breath (13 Aug 2023 10:43)    No acute overnight events.  Patient sitting at the edge of the bed.  No complaints offered.    MEDICATIONS  (STANDING):  cefTRIAXone   IVPB      cefTRIAXone   IVPB 1000 milliGRAM(s) IV Intermittent every 24 hours  dextrose 5%. 1000 milliLiter(s) (50 mL/Hr) IV Continuous <Continuous>  dextrose 5%. 1000 milliLiter(s) (100 mL/Hr) IV Continuous <Continuous>  dextrose 50% Injectable 12.5 Gram(s) IV Push once  dextrose 50% Injectable 25 Gram(s) IV Push once  dextrose 50% Injectable 25 Gram(s) IV Push once  glucagon  Injectable 1 milliGRAM(s) IntraMuscular once  heparin   Injectable 5000 Unit(s) SubCutaneous every 12 hours  insulin glargine Injectable (LANTUS) 8 Unit(s) SubCutaneous at bedtime  insulin lispro (ADMELOG) corrective regimen sliding scale   SubCutaneous at bedtime  insulin lispro (ADMELOG) corrective regimen sliding scale   SubCutaneous three times a day before meals  ketoconazole 2% Cream 1 Application(s) Topical two times a day  polyethylene glycol 3350 17 Gram(s) Oral daily  senna 2 Tablet(s) Oral at bedtime  tiotropium 2.5 MICROgram(s) Inhaler 2 Puff(s) Inhalation daily    MEDICATIONS  (PRN):  acetaminophen     Tablet .. 650 milliGRAM(s) Oral every 6 hours PRN Temp greater or equal to 38C (100.4F), Mild Pain (1 - 3)  dextrose Oral Gel 15 Gram(s) Oral once PRN Blood Glucose LESS THAN 70 milliGRAM(s)/deciliter  morphine  - Injectable 2 milliGRAM(s) IV Push every 6 hours PRN Severe Pain (7 - 10)  oxyCODONE    IR 5 milliGRAM(s) Oral every 6 hours PRN Moderate Pain (4 - 6)      ROS  No fever, sweats, chills  No epistaxis, HA, sore throat  No CP, SOB, cough, sputum  No n/v/d, abd pain, melena, hematochezia  No edema  No rash  No anxiety  No back pain, joint pain  No bleeding, bruising  No dysuria, hematuria    Vital Signs Last 24 Hrs  T(C): 36.9 (13 Aug 2023 05:45), Max: 37 (12 Aug 2023 21:45)  T(F): 98.5 (13 Aug 2023 05:45), Max: 98.6 (12 Aug 2023 21:45)  HR: 96 (13 Aug 2023 05:45) (96 - 104)  BP: 108/60 (13 Aug 2023 05:45) (108/60 - 124/60)  BP(mean): --  RR: 18 (13 Aug 2023 05:45) (16 - 18)  SpO2: 97% (13 Aug 2023 05:45) (95% - 100%)    Parameters below as of 13 Aug 2023 05:45  Patient On (Oxygen Delivery Method): nasal cannula  O2 Flow (L/min): 2      PE  frail  Awake, alert  Anicteric, MMM  RRR  CTAB  2LNC  Abd soft, NT, ND  No c/c                          11.1   13.18 )-----------( 667      ( 13 Aug 2023 07:50 )             34.7       08-13    131<L>  |  93<L>  |  6<L>  ----------------------------<  120<H>  4.0   |  27  |  0.54    Ca    10.4      13 Aug 2023 07:50  Phos  3.1     08-13  Mg     2.00     08-13

## 2023-08-13 NOTE — PROGRESS NOTE ADULT - ASSESSMENT
67 yo female with PMH lung Ca on CT (last CT 3 weeks ago), DM for evaluation of shortness of breath and cough x 1 week, acutely worsened yesterday after walking a lot visiting the statue of [employer] Nephrology consulted for hyponatremia.     Hyponatremia  Continues to improve.  Pt denied hx of known hyponatremia however Na has been low since admission  Likely SIADH.  Free water restriction <1L/day - pt educated  Completed NaCl 1g TID x2 days; will resume NaCl 1gm TID x2 days.  Monitor Na  Pending urine osmo; Urine Na 50.  Avoid overcorrection, na should not correct >8meq in 24 hrs

## 2023-08-13 NOTE — PROGRESS NOTE ADULT - SUBJECTIVE AND OBJECTIVE BOX
Date of Service  : 08-13-23 @ 10:43    INTERVAL HPI/OVERNIGHT EVENTS: I feel better.   Vital Signs Last 24 Hrs  T(C): 36.9 (13 Aug 2023 05:45), Max: 37 (12 Aug 2023 21:45)  T(F): 98.5 (13 Aug 2023 05:45), Max: 98.6 (12 Aug 2023 21:45)  HR: 96 (13 Aug 2023 05:45) (96 - 104)  BP: 108/60 (13 Aug 2023 05:45) (108/60 - 124/60)  BP(mean): --  RR: 18 (13 Aug 2023 05:45) (16 - 18)  SpO2: 97% (13 Aug 2023 05:45) (95% - 100%)    Parameters below as of 13 Aug 2023 05:45  Patient On (Oxygen Delivery Method): nasal cannula  O2 Flow (L/min): 2    I&O's Summary    MEDICATIONS  (STANDING):  cefTRIAXone   IVPB 1000 milliGRAM(s) IV Intermittent every 24 hours  cefTRIAXone   IVPB      dextrose 5%. 1000 milliLiter(s) (50 mL/Hr) IV Continuous <Continuous>  dextrose 5%. 1000 milliLiter(s) (100 mL/Hr) IV Continuous <Continuous>  dextrose 50% Injectable 25 Gram(s) IV Push once  dextrose 50% Injectable 12.5 Gram(s) IV Push once  dextrose 50% Injectable 25 Gram(s) IV Push once  glucagon  Injectable 1 milliGRAM(s) IntraMuscular once  heparin   Injectable 5000 Unit(s) SubCutaneous every 12 hours  insulin glargine Injectable (LANTUS) 8 Unit(s) SubCutaneous at bedtime  insulin lispro (ADMELOG) corrective regimen sliding scale   SubCutaneous at bedtime  insulin lispro (ADMELOG) corrective regimen sliding scale   SubCutaneous three times a day before meals  ketoconazole 2% Cream 1 Application(s) Topical two times a day  polyethylene glycol 3350 17 Gram(s) Oral daily  senna 2 Tablet(s) Oral at bedtime  tiotropium 2.5 MICROgram(s) Inhaler 2 Puff(s) Inhalation daily    MEDICATIONS  (PRN):  acetaminophen     Tablet .. 650 milliGRAM(s) Oral every 6 hours PRN Temp greater or equal to 38C (100.4F), Mild Pain (1 - 3)  dextrose Oral Gel 15 Gram(s) Oral once PRN Blood Glucose LESS THAN 70 milliGRAM(s)/deciliter  morphine  - Injectable 2 milliGRAM(s) IV Push every 6 hours PRN Severe Pain (7 - 10)  oxyCODONE    IR 5 milliGRAM(s) Oral every 6 hours PRN Moderate Pain (4 - 6)    LABS:                        11.1   13.18 )-----------( 667      ( 13 Aug 2023 07:50 )             34.7     08-13    131<L>  |  93<L>  |  6<L>  ----------------------------<  120<H>  4.0   |  27  |  0.54    Ca    10.4      13 Aug 2023 07:50  Phos  3.1     08-13  Mg     2.00     08-13        Urinalysis Basic - ( 13 Aug 2023 07:50 )    Color: x / Appearance: x / SG: x / pH: x  Gluc: 120 mg/dL / Ketone: x  / Bili: x / Urobili: x   Blood: x / Protein: x / Nitrite: x   Leuk Esterase: x / RBC: x / WBC x   Sq Epi: x / Non Sq Epi: x / Bacteria: x      CAPILLARY BLOOD GLUCOSE      POCT Blood Glucose.: 118 mg/dL (13 Aug 2023 09:26)  POCT Blood Glucose.: 277 mg/dL (12 Aug 2023 21:45)  POCT Blood Glucose.: 225 mg/dL (12 Aug 2023 17:45)  POCT Blood Glucose.: 227 mg/dL (12 Aug 2023 12:41)        Urinalysis Basic - ( 13 Aug 2023 07:50 )    Color: x / Appearance: x / SG: x / pH: x  Gluc: 120 mg/dL / Ketone: x  / Bili: x / Urobili: x   Blood: x / Protein: x / Nitrite: x   Leuk Esterase: x / RBC: x / WBC x   Sq Epi: x / Non Sq Epi: x / Bacteria: x      REVIEW OF SYSTEMS:  CONSTITUTIONAL: No fever, weight loss, or fatigue  EYES: No eye pain, visual disturbances, or discharge  ENMT:  No difficulty hearing, tinnitus, vertigo; No sinus or throat pain  NECK: No pain or stiffness  RESPIRATORY: No cough, wheezing, chills or hemoptysis; No shortness of breath  CARDIOVASCULAR: No chest pain, palpitations, dizziness, or leg swelling  GASTROINTESTINAL: No abdominal or epigastric pain. No nausea, vomiting, or hematemesis; No diarrhea or constipation. No melena or hematochezia.  GENITOURINARY: No dysuria, frequency, hematuria, or incontinence  NEUROLOGICAL: No headaches, memory loss, loss of strength, numbness, or tremors    RADIOLOGY & ADDITIONAL TESTS:    Consultant(s) Notes Reviewed:  [x ] YES  [ ] NO    PHYSICAL EXAM:  GENERAL: NAD,NC Oxygen   HEAD:  Atraumatic, Normocephalic  EYES: EOMI, PERRLA, conjunctiva and sclera clear  ENMT: No tonsillar erythema, exudates, or enlargement; Moist mucous membranes, Good dentition, No lesions  NECK: Supple, No JVD, Normal thyroid  NERVOUS SYSTEM:  Alert & Oriented X3, No focal deficit   CHEST/LUNG: Good air entry except bases   HEART: Regular rate and rhythm; No murmurs, rubs, or gallops  ABDOMEN: Soft, Nontender, Nondistended; Bowel sounds present  EXTREMITIES:  2+ Peripheral Pulses, No clubbing, cyanosis, or edema    Care Discussed with Consultants/Other Providers [ x] YES  [ ] NO

## 2023-08-14 NOTE — PROGRESS NOTE ADULT - ASSESSMENT
This is a 68 year old female with stage IV large cell neuroendocrine cancer who presents with dyspnea and cough.    1. Stage IV large cell neuroendocrine cancer   -- Involving lungs, bones, and lymph nodes   -- Currently on treatment with pemetrexed and pembrolizumab (LD 07/21)   -- No systemic treatment while admitted  -- [place] up [place]. Rodriguez [place] [facility] [place]     2. Shortness of breath   -- Secondary to disease burden   -- CTA chest w/o PE; extensive pulm mets and mediastinal/hilar lymphadenopathy; T10 lytic lesion; extrinsic compression of RLL bronchus   -- Pulmonary consulted, appreciate recommendations- interventional pulm planning for bronchoscopy tomorrow 08/15, possible debulking vs stent   -- On abx for post-obstructive pneumonia, 2L NC  -- PET/CT from 07/17 with right hilar mass, numerous pulmonary mets, thoracic and mediastinal lymphadenopathy, pleural mets, and osseous mets (L5, left hemisacrum, left anterior 6th rib, right prox humeral shaft, left posterior arch of C1)   -- Continue supplemental O2 as needed, wean as tolerated     3. Osseous mets   -- Neurosurgery consulted for T10 lytic lesion  -- Bone scan with findings suspicious for osseous mets involving mid thoracic spine, L5, midshaft of R femur; metastatic disease vs post-traumatic changes in L mid and R lower ribs   -- MRI T/L spine with multiple thoracic vertebral mets within T10-T11, and T7 and T10 transverse processes; indeterminate L5 vertebral lesion; no evidence of cord compromise   -- Ortho consulted, R femur lesion not concerning for mets  -- Continue pain control     4. Hyponatremia   -- Likely SIADH   -- Nephrology consulted, appreciate recs   -- improving     Will continue to follow.    Adelaide Sampson PA-C  Hematology/Oncology  [facility]  [phone] (office)  [phone] (alt office)  Evenings and weekends please call MD on call or office

## 2023-08-14 NOTE — PROGRESS NOTE ADULT - SUBJECTIVE AND OBJECTIVE BOX
Patient is a 68y old  Female who presents with a chief complaint of shortness of breath (14 Aug 2023 12:25)    Patient seen this morning. She feels well and denies any new complaints at this time. No chest pain/dyspnea. Rib pain is adequately controlled.     MEDICATIONS  (STANDING):  cefTRIAXone   IVPB      cefTRIAXone   IVPB 1000 milliGRAM(s) IV Intermittent every 24 hours  dextrose 5%. 1000 milliLiter(s) (50 mL/Hr) IV Continuous <Continuous>  dextrose 5%. 1000 milliLiter(s) (100 mL/Hr) IV Continuous <Continuous>  dextrose 50% Injectable 25 Gram(s) IV Push once  dextrose 50% Injectable 12.5 Gram(s) IV Push once  dextrose 50% Injectable 25 Gram(s) IV Push once  glucagon  Injectable 1 milliGRAM(s) IntraMuscular once  insulin glargine Injectable (LANTUS) 8 Unit(s) SubCutaneous at bedtime  insulin lispro (ADMELOG) corrective regimen sliding scale   SubCutaneous at bedtime  insulin lispro (ADMELOG) corrective regimen sliding scale   SubCutaneous three times a day before meals  ketoconazole 2% Cream 1 Application(s) Topical two times a day  polyethylene glycol 3350 17 Gram(s) Oral daily  senna 2 Tablet(s) Oral at bedtime  sodium chloride 1 Gram(s) Oral three times a day  tiotropium 2.5 MICROgram(s) Inhaler 2 Puff(s) Inhalation daily    MEDICATIONS  (PRN):  acetaminophen     Tablet .. 650 milliGRAM(s) Oral every 6 hours PRN Temp greater or equal to 38C (100.4F), Mild Pain (1 - 3)  albuterol/ipratropium for Nebulization 3 milliLiter(s) Nebulizer every 6 hours PRN Shortness of Breath and/or Wheezing  dextrose Oral Gel 15 Gram(s) Oral once PRN Blood Glucose LESS THAN 70 milliGRAM(s)/deciliter  morphine  - Injectable 2 milliGRAM(s) IV Push every 6 hours PRN Severe Pain (7 - 10)  oxyCODONE    IR 5 milliGRAM(s) Oral every 6 hours PRN Moderate Pain (4 - 6)        Vital Signs Last 24 Hrs  T(C): 36.6 (14 Aug 2023 05:45), Max: 36.9 (13 Aug 2023 21:06)  T(F): 97.9 (14 Aug 2023 05:45), Max: 98.5 (13 Aug 2023 21:06)  HR: 96 (14 Aug 2023 05:45) (96 - 111)  BP: 106/61 (14 Aug 2023 05:45) (106/61 - 149/73)  BP(mean): --  RR: 18 (14 Aug 2023 05:45) (18 - 18)  SpO2: 99% (14 Aug 2023 05:45) (99% - 100%)    Parameters below as of 14 Aug 2023 05:45  Patient On (Oxygen Delivery Method): nasal cannula  O2 Flow (L/min): 2      PE  NAD  Awake, alert  Anicteric, MMM  Abd soft, NT, ND  mild TTP right posterior/lateral lower ribs   No c/c/e  No rash grossly  FROM                          10.8   14.47 )-----------( 666      ( 14 Aug 2023 04:00 )             34.2       08-14    133<L>  |  96<L>  |  8   ----------------------------<  190<H>  4.3   |  27  |  0.61    Ca    10.0      14 Aug 2023 04:00  Phos  2.9     08-14  Mg     2.00     08-14

## 2023-08-14 NOTE — DIETITIAN INITIAL EVALUATION ADULT - PERTINENT LABORATORY DATA
08-14    133<L>  |  96<L>  |  8   ----------------------------<  190<H>  4.3   |  27  |  0.61    Ca    10.0      14 Aug 2023 04:00  Phos  2.9     08-14  Mg     2.00     08-14    POCT Blood Glucose.: 153 mg/dL (08-14-23 @ 12:51)  A1C with Estimated Average Glucose Result: 9.0 % (08-07-23 @ 10:58)

## 2023-08-14 NOTE — DIETITIAN INITIAL EVALUATION ADULT - PERTINENT MEDS FT
MEDICATIONS  (STANDING):  cefTRIAXone   IVPB 1000 milliGRAM(s) IV Intermittent every 24 hours  cefTRIAXone   IVPB      dextrose 5%. 1000 milliLiter(s) (100 mL/Hr) IV Continuous <Continuous>  dextrose 5%. 1000 milliLiter(s) (50 mL/Hr) IV Continuous <Continuous>  dextrose 50% Injectable 25 Gram(s) IV Push once  dextrose 50% Injectable 12.5 Gram(s) IV Push once  dextrose 50% Injectable 25 Gram(s) IV Push once  glucagon  Injectable 1 milliGRAM(s) IntraMuscular once  insulin glargine Injectable (LANTUS) 8 Unit(s) SubCutaneous at bedtime  insulin lispro (ADMELOG) corrective regimen sliding scale   SubCutaneous three times a day before meals  insulin lispro (ADMELOG) corrective regimen sliding scale   SubCutaneous at bedtime  ketoconazole 2% Cream 1 Application(s) Topical two times a day  polyethylene glycol 3350 17 Gram(s) Oral daily  senna 2 Tablet(s) Oral at bedtime  sodium chloride 1 Gram(s) Oral three times a day  tiotropium 2.5 MICROgram(s) Inhaler 2 Puff(s) Inhalation daily    MEDICATIONS  (PRN):  acetaminophen     Tablet .. 650 milliGRAM(s) Oral every 6 hours PRN Temp greater or equal to 38C (100.4F), Mild Pain (1 - 3)  albuterol/ipratropium for Nebulization 3 milliLiter(s) Nebulizer every 6 hours PRN Shortness of Breath and/or Wheezing  dextrose Oral Gel 15 Gram(s) Oral once PRN Blood Glucose LESS THAN 70 milliGRAM(s)/deciliter  morphine  - Injectable 2 milliGRAM(s) IV Push every 6 hours PRN Severe Pain (7 - 10)  oxyCODONE    IR 5 milliGRAM(s) Oral every 6 hours PRN Moderate Pain (4 - 6)

## 2023-08-14 NOTE — PROGRESS NOTE ADULT - ASSESSMENT
69YO Female with stage IV large cell neuroendocrine cancer on chemotherapy, presents to the ER with dyspnea and cough, found to have extrinsic compression of RLL and pulm consulted for further recommendations.    #Stage IV large cell neuroendocrine cancer   - currently on treatment with pemetrexed and pembrolizumab q 3 weeks (last on 07/21)  - CT scan shows extensive metastatic disease as well as RLL compression from extrinsic compression of hilar mass (and possibly endobronchial lesion?) with RLL atelectasis  - per heme note, PET/CT from 07/17 showing right hilar mass, numerous pulmonary mets, thoracic and mediastinal lymphadenopathy, pleural mets, and osseous mets (L5, left hemisacrum, left anterior 6th rib, right prox humeral shaft, left posterior arch of C1  - unclear if RLL collapse is new?  - chest pain over right hemithorax is likely secondary to pleural irritation    Recommendations:  - patient with SOB in the setting of pulm mets and RLL collapse  - given immunocompromised state and elevate wbc count with RLL collapse; concern for post obstructive PNA and would treat with abx  - would be helpful to [place] from [facility] [place] please obtain if possible  - patient with extensive emphysema and likely has COPD (never been formally diagnosed); no wheezing on exam  - C/w Spiriva   - Start Duonebs PRN wheezing or SOB  - Plan for Bronchoscopy 8/15 at 4:30pm   - Please keep pt NPO after midnight tonight  - Please hold AC/DVT ppx evening   - Please obtain morning CBC, BMP, and PT/INR and PTT and replete as needed early AM prior to procedure  - Please maintain active type and screen   69YO Female with stage IV large cell neuroendocrine cancer on chemotherapy, presents to the ER with dyspnea and cough, found to have extrinsic compression of RLL and pulm consulted for further recommendations.    #Stage IV large cell neuroendocrine cancer   - currently on treatment with pemetrexed and pembrolizumab q 3 weeks (last on 07/21)  - CT scan shows extensive metastatic disease as well as RLL compression from extrinsic compression of hilar mass (and possibly endobronchial lesion?) with RLL atelectasis  - per heme note, PET/CT from 07/17 showing right hilar mass, numerous pulmonary mets, thoracic and mediastinal lymphadenopathy, pleural mets, and osseous mets (L5, left hemisacrum, left anterior 6th rib, right prox humeral shaft, left posterior arch of C1  - unclear if RLL collapse is new?  - chest pain over right hemithorax is likely secondary to pleural irritation    Recommendations:  - patient with SOB in the setting of pulm mets and RLL collapse  - given immunocompromised state and elevate wbc count with RLL collapse; concern for post obstructive PNA and c/w Ceftriaxone  - would be helpful to view images from [facility] [place] --> please obtain if possible  - patient with extensive emphysema and likely has COPD (never been formally diagnosed); no wheezing on exam  - C/w Spiriva   - Start Duonebs PRN wheezing or SOB  - Plan for Bronchoscopy 8/15 at 4:30pm   - Please keep pt NPO after midnight tonight  - Please hold AC/DVT ppx evening   - Please obtain morning CBC, BMP, and PT/INR and PTT and replete as needed early AM prior to procedure  - Please maintain active type and screen    Case discussed with Dr. Pat

## 2023-08-14 NOTE — PROGRESS NOTE ADULT - SUBJECTIVE AND OBJECTIVE BOX
CHIEF COMPLAINT:    Interval Events:    REVIEW OF SYSTEMS:  Constitutional: [ ] negative [ ] fevers [ ] chills [ ] weight loss [ ] weight gain  HEENT: [ ] negative [ ] dry eyes [ ] eye irritation [ ] postnasal drip [ ] nasal congestion  CV: [ ] negative  [ ] chest pain [ ] orthopnea [ ] palpitations [ ] murmur  Resp: [ ] negative [ ] cough [ ] shortness of breath [ ] dyspnea [ ] wheezing [ ] sputum [ ] hemoptysis  GI: [ ] negative [ ] nausea [ ] vomiting [ ] diarrhea [ ] constipation [ ] abd pain [ ] dysphagia   : [ ] negative [ ] dysuria [ ] nocturia [ ] hematuria [ ] increased urinary frequency  Musculoskeletal: [ ] negative [ ] back pain [ ] myalgias [ ] arthralgias [ ] fracture  Skin: [ ] negative [ ] rash [ ] itch  Neurological: [ ] negative [ ] headache [ ] dizziness [ ] syncope [ ] weakness [ ] numbness  Psychiatric: [ ] negative [ ] anxiety [ ] depression  Endocrine: [ ] negative [ ] diabetes [ ] thyroid problem  Hematologic/Lymphatic: [ ] negative [ ] anemia [ ] bleeding problem  Allergic/Immunologic: [ ] negative [ ] itchy eyes [ ] nasal discharge [ ] hives [ ] angioedema  [ ] All other systems negative  [ ] Unable to assess ROS because ________    OBJECTIVE:  ICU Vital Signs Last 24 Hrs  T(C): 36.6 (14 Aug 2023 05:45), Max: 36.9 (13 Aug 2023 21:06)  T(F): 97.9 (14 Aug 2023 05:45), Max: 98.5 (13 Aug 2023 21:06)  HR: 96 (14 Aug 2023 05:45) (96 - 111)  BP: 106/61 (14 Aug 2023 05:45) (106/61 - 149/73)  BP(mean): --  ABP: --  ABP(mean): --  RR: 18 (14 Aug 2023 05:45) (17 - 18)  SpO2: 99% (14 Aug 2023 05:45) (99% - 100%)    O2 Parameters below as of 14 Aug 2023 05:45  Patient On (Oxygen Delivery Method): nasal cannula  O2 Flow (L/min): 2            CAPILLARY BLOOD GLUCOSE      POCT Blood Glucose.: 228 mg/dL (13 Aug 2023 21:03)      PHYSICAL EXAM:  General:   HEENT:   Lymph Nodes:  Neck:   Respiratory:   Cardiovascular:   Abdomen:   Extremities:   Skin:   Neurological:  Psychiatry:    HOSPITAL MEDICATIONS:  MEDICATIONS  (STANDING):  cefTRIAXone   IVPB 1000 milliGRAM(s) IV Intermittent every 24 hours  cefTRIAXone   IVPB      dextrose 5%. 1000 milliLiter(s) (50 mL/Hr) IV Continuous <Continuous>  dextrose 5%. 1000 milliLiter(s) (100 mL/Hr) IV Continuous <Continuous>  dextrose 50% Injectable 25 Gram(s) IV Push once  dextrose 50% Injectable 25 Gram(s) IV Push once  dextrose 50% Injectable 12.5 Gram(s) IV Push once  glucagon  Injectable 1 milliGRAM(s) IntraMuscular once  heparin   Injectable 5000 Unit(s) SubCutaneous every 12 hours  insulin glargine Injectable (LANTUS) 8 Unit(s) SubCutaneous at bedtime  insulin lispro (ADMELOG) corrective regimen sliding scale   SubCutaneous three times a day before meals  insulin lispro (ADMELOG) corrective regimen sliding scale   SubCutaneous at bedtime  ketoconazole 2% Cream 1 Application(s) Topical two times a day  polyethylene glycol 3350 17 Gram(s) Oral daily  senna 2 Tablet(s) Oral at bedtime  sodium chloride 1 Gram(s) Oral three times a day  tiotropium 2.5 MICROgram(s) Inhaler 2 Puff(s) Inhalation daily    MEDICATIONS  (PRN):  acetaminophen     Tablet .. 650 milliGRAM(s) Oral every 6 hours PRN Temp greater or equal to 38C (100.4F), Mild Pain (1 - 3)  dextrose Oral Gel 15 Gram(s) Oral once PRN Blood Glucose LESS THAN 70 milliGRAM(s)/deciliter  morphine  - Injectable 2 milliGRAM(s) IV Push every 6 hours PRN Severe Pain (7 - 10)  oxyCODONE    IR 5 milliGRAM(s) Oral every 6 hours PRN Moderate Pain (4 - 6)      LABS:                        11.1   13.18 )-----------( 667      ( 13 Aug 2023 07:50 )             34.7     Hgb Trend: 11.1<--, 10.3<--, 10.7<--, 11.0<--, 12.2<--  08-13    131<L>  |  93<L>  |  6<L>  ----------------------------<  120<H>  4.0   |  27  |  0.54    Ca    10.4      13 Aug 2023 07:50  Phos  3.1     08-13  Mg     2.00     08-13      Creatinine Trend: 0.54<--, 0.57<--, 0.54<--, 0.60<--, 0.50<--, 0.50<--    Urinalysis Basic - ( 13 Aug 2023 07:50 )    Color: x / Appearance: x / SG: x / pH: x  Gluc: 120 mg/dL / Ketone: x  / Bili: x / Urobili: x   Blood: x / Protein: x / Nitrite: x   Leuk Esterase: x / RBC: x / WBC x   Sq Epi: x / Non Sq Epi: x / Bacteria: x            MICROBIOLOGY:       RADIOLOGY:  [ ] Reviewed and interpreted by me    PULMONARY FUNCTION TESTS:    EKG: CHIEF COMPLAINT: SOB    Interval Events: She feels somewhat improved in terms of her SOB    REVIEW OF SYSTEMS:  Constitutional: [ ] negative [ ] fevers [ ] chills [ ] weight loss [ ] weight gain  HEENT: [ ] negative [ ] dry eyes [ ] eye irritation [ ] postnasal drip [ ] nasal congestion  CV: [ ] negative  [ ] chest pain [ ] orthopnea [ ] palpitations [ ] murmur  Resp: [ ] negative [ ] cough [x ] shortness of breath [ ] dyspnea [ ] wheezing [ ] sputum [ ] hemoptysis  GI: [ ] negative [ ] nausea [ ] vomiting [ ] diarrhea [ ] constipation [ ] abd pain [ ] dysphagia   : [ ] negative [ ] dysuria [ ] nocturia [ ] hematuria [ ] increased urinary frequency  Musculoskeletal: [ ] negative [ ] back pain [ ] myalgias [ ] arthralgias [ ] fracture  Skin: [ ] negative [ ] rash [ ] itch  Neurological: [ ] negative [ ] headache [ ] dizziness [ ] syncope [ ] weakness [ ] numbness  Psychiatric: [ ] negative [ ] anxiety [ ] depression  Endocrine: [ ] negative [ ] diabetes [ ] thyroid problem  Hematologic/Lymphatic: [ ] negative [ ] anemia [ ] bleeding problem  Allergic/Immunologic: [ ] negative [ ] itchy eyes [ ] nasal discharge [ ] hives [ ] angioedema  [x ] All other systems negative  [ ] Unable to assess ROS because ________    OBJECTIVE:  ICU Vital Signs Last 24 Hrs  T(C): 36.6 (14 Aug 2023 05:45), Max: 36.9 (13 Aug 2023 21:06)  T(F): 97.9 (14 Aug 2023 05:45), Max: 98.5 (13 Aug 2023 21:06)  HR: 96 (14 Aug 2023 05:45) (96 - 111)  BP: 106/61 (14 Aug 2023 05:45) (106/61 - 149/73)  BP(mean): --  ABP: --  ABP(mean): --  RR: 18 (14 Aug 2023 05:45) (17 - 18)  SpO2: 99% (14 Aug 2023 05:45) (99% - 100%)    O2 Parameters below as of 14 Aug 2023 05:45  Patient On (Oxygen Delivery Method): nasal cannula  O2 Flow (L/min): 2    CAPILLARY BLOOD GLUCOSE      POCT Blood Glucose.: 228 mg/dL (13 Aug 2023 21:03)      PHYSICAL EXAM:  General: Frail female laying in bed in no acute distress  HEENT: Nasal canula in place  Respiratory: Diminished breath sounds at the Right base  Cardiovascular: Regular rate and rhythm   Abdomen: Nontender nondistended  Extremities: No peripheral edema  Skin: No rashes  Neurological: No appreciable neurologic deficits  Psychiatry: Appropriate mood and affect    HOSPITAL MEDICATIONS:  MEDICATIONS  (STANDING):  cefTRIAXone   IVPB 1000 milliGRAM(s) IV Intermittent every 24 hours  cefTRIAXone   IVPB      dextrose 5%. 1000 milliLiter(s) (50 mL/Hr) IV Continuous <Continuous>  dextrose 5%. 1000 milliLiter(s) (100 mL/Hr) IV Continuous <Continuous>  dextrose 50% Injectable 25 Gram(s) IV Push once  dextrose 50% Injectable 25 Gram(s) IV Push once  dextrose 50% Injectable 12.5 Gram(s) IV Push once  glucagon  Injectable 1 milliGRAM(s) IntraMuscular once  heparin   Injectable 5000 Unit(s) SubCutaneous every 12 hours  insulin glargine Injectable (LANTUS) 8 Unit(s) SubCutaneous at bedtime  insulin lispro (ADMELOG) corrective regimen sliding scale   SubCutaneous three times a day before meals  insulin lispro (ADMELOG) corrective regimen sliding scale   SubCutaneous at bedtime  ketoconazole 2% Cream 1 Application(s) Topical two times a day  polyethylene glycol 3350 17 Gram(s) Oral daily  senna 2 Tablet(s) Oral at bedtime  sodium chloride 1 Gram(s) Oral three times a day  tiotropium 2.5 MICROgram(s) Inhaler 2 Puff(s) Inhalation daily    MEDICATIONS  (PRN):  acetaminophen     Tablet .. 650 milliGRAM(s) Oral every 6 hours PRN Temp greater or equal to 38C (100.4F), Mild Pain (1 - 3)  dextrose Oral Gel 15 Gram(s) Oral once PRN Blood Glucose LESS THAN 70 milliGRAM(s)/deciliter  morphine  - Injectable 2 milliGRAM(s) IV Push every 6 hours PRN Severe Pain (7 - 10)  oxyCODONE    IR 5 milliGRAM(s) Oral every 6 hours PRN Moderate Pain (4 - 6)      LABS:                        11.1   13.18 )-----------( 667      ( 13 Aug 2023 07:50 )             34.7     Hgb Trend: 11.1<--, 10.3<--, 10.7<--, 11.0<--, 12.2<--  08-13    131<L>  |  93<L>  |  6<L>  ----------------------------<  120<H>  4.0   |  27  |  0.54    Ca    10.4      13 Aug 2023 07:50  Phos  3.1     08-13  Mg     2.00     08-13      Creatinine Trend: 0.54<--, 0.57<--, 0.54<--, 0.60<--, 0.50<--, 0.50<--    Urinalysis Basic - ( 13 Aug 2023 07:50 )    Color: x / Appearance: x / SG: x / pH: x  Gluc: 120 mg/dL / Ketone: x  / Bili: x / Urobili: x   Blood: x / Protein: x / Nitrite: x   Leuk Esterase: x / RBC: x / WBC x   Sq Epi: x / Non Sq Epi: x / Bacteria: x            MICROBIOLOGY:       RADIOLOGY:  [ ] Reviewed and interpreted by me    PULMONARY FUNCTION TESTS:    EKG:

## 2023-08-14 NOTE — PROGRESS NOTE ADULT - SUBJECTIVE AND OBJECTIVE BOX
No Date of Service  : 08-14-23     INTERVAL HPI/OVERNIGHT EVENTS: I feel fine.   Vital Signs Last 24 Hrs  T(C): 36.6 (14 Aug 2023 05:45), Max: 36.9 (13 Aug 2023 21:06)  T(F): 97.9 (14 Aug 2023 05:45), Max: 98.5 (13 Aug 2023 21:06)  HR: 96 (14 Aug 2023 05:45) (96 - 111)  BP: 106/61 (14 Aug 2023 05:45) (106/61 - 149/73)  BP(mean): --  RR: 18 (14 Aug 2023 05:45) (17 - 18)  SpO2: 99% (14 Aug 2023 05:45) (99% - 100%)    Parameters below as of 14 Aug 2023 05:45  Patient On (Oxygen Delivery Method): nasal cannula  O2 Flow (L/min): 2    I&O's Summary    MEDICATIONS  (STANDING):  cefTRIAXone   IVPB      cefTRIAXone   IVPB 1000 milliGRAM(s) IV Intermittent every 24 hours  dextrose 5%. 1000 milliLiter(s) (50 mL/Hr) IV Continuous <Continuous>  dextrose 5%. 1000 milliLiter(s) (100 mL/Hr) IV Continuous <Continuous>  dextrose 50% Injectable 25 Gram(s) IV Push once  dextrose 50% Injectable 12.5 Gram(s) IV Push once  dextrose 50% Injectable 25 Gram(s) IV Push once  glucagon  Injectable 1 milliGRAM(s) IntraMuscular once  insulin glargine Injectable (LANTUS) 8 Unit(s) SubCutaneous at bedtime  insulin lispro (ADMELOG) corrective regimen sliding scale   SubCutaneous at bedtime  insulin lispro (ADMELOG) corrective regimen sliding scale   SubCutaneous three times a day before meals  ketoconazole 2% Cream 1 Application(s) Topical two times a day  polyethylene glycol 3350 17 Gram(s) Oral daily  senna 2 Tablet(s) Oral at bedtime  sodium chloride 1 Gram(s) Oral three times a day  tiotropium 2.5 MICROgram(s) Inhaler 2 Puff(s) Inhalation daily    MEDICATIONS  (PRN):  acetaminophen     Tablet .. 650 milliGRAM(s) Oral every 6 hours PRN Temp greater or equal to 38C (100.4F), Mild Pain (1 - 3)  albuterol/ipratropium for Nebulization 3 milliLiter(s) Nebulizer every 6 hours PRN Shortness of Breath and/or Wheezing  dextrose Oral Gel 15 Gram(s) Oral once PRN Blood Glucose LESS THAN 70 milliGRAM(s)/deciliter  morphine  - Injectable 2 milliGRAM(s) IV Push every 6 hours PRN Severe Pain (7 - 10)  oxyCODONE    IR 5 milliGRAM(s) Oral every 6 hours PRN Moderate Pain (4 - 6)    LABS:                        10.8   14.47 )-----------( 666      ( 14 Aug 2023 04:00 )             34.2     08-14    133<L>  |  96<L>  |  8   ----------------------------<  190<H>  4.3   |  27  |  0.61    Ca    10.0      14 Aug 2023 04:00  Phos  2.9     08-14  Mg     2.00     08-14        Urinalysis Basic - ( 14 Aug 2023 04:00 )    Color: x / Appearance: x / SG: x / pH: x  Gluc: 190 mg/dL / Ketone: x  / Bili: x / Urobili: x   Blood: x / Protein: x / Nitrite: x   Leuk Esterase: x / RBC: x / WBC x   Sq Epi: x / Non Sq Epi: x / Bacteria: x      CAPILLARY BLOOD GLUCOSE      POCT Blood Glucose.: 152 mg/dL (14 Aug 2023 08:46)  POCT Blood Glucose.: 228 mg/dL (13 Aug 2023 21:03)  POCT Blood Glucose.: 179 mg/dL (13 Aug 2023 17:56)  POCT Blood Glucose.: 161 mg/dL (13 Aug 2023 12:12)        Urinalysis Basic - ( 14 Aug 2023 04:00 )    Color: x / Appearance: x / SG: x / pH: x  Gluc: 190 mg/dL / Ketone: x  / Bili: x / Urobili: x   Blood: x / Protein: x / Nitrite: x   Leuk Esterase: x / RBC: x / WBC x   Sq Epi: x / Non Sq Epi: x / Bacteria: x      REVIEW OF SYSTEMS:  CONSTITUTIONAL: No fever, weight loss, or fatigue  EYES: No eye pain, visual disturbances, or discharge  ENMT:  No difficulty hearing, tinnitus, vertigo; No sinus or throat pain  NECK: No pain or stiffness  RESPIRATORY: No cough, wheezing, chills or hemoptysis; No shortness of breath  CARDIOVASCULAR: No chest pain, palpitations, dizziness, or leg swelling  GASTROINTESTINAL: No abdominal or epigastric pain. No nausea, vomiting, or hematemesis; No diarrhea or constipation. No melena or hematochezia.  GENITOURINARY: No dysuria, frequency, hematuria, or incontinence  NEUROLOGICAL: No headaches, memory loss, loss of strength, numbness, or tremors      RADIOLOGY & ADDITIONAL TESTS:    Consultant(s) Notes Reviewed:  [x ] YES  [ ] NO    PHYSICAL EXAM:  GENERAL: NAD, NC Oxygen   HEAD:  Atraumatic, Normocephalic  NECK: Supple, No JVD, Normal thyroid  NERVOUS SYSTEM:  Alert & Oriented X3, No focal deficit   CHEST/LUNG: Good air entry except bases   HEART: Regular rate and rhythm; No murmurs, rubs, or gallops  ABDOMEN: Soft, Nontender, Nondistended; Bowel sounds present  EXTREMITIES:  2+ Peripheral Pulses, No clubbing, cyanosis, or edema    Care Discussed with Consultants/Other Providers [ x] YES  [ ] NO

## 2023-08-14 NOTE — DIETITIAN INITIAL EVALUATION ADULT - ORAL INTAKE PTA/DIET HISTORY
Pt reports poor appetite at home and not interested in complying to CHO consistent diet. No UBW available.

## 2023-08-14 NOTE — PROGRESS NOTE ADULT - ASSESSMENT
69 yo female with PMH lung Ca on CT (last CT 3 weeks ago), DM for evaluation of shortness of breath and cough x 1 week, acutely worsened yesterday after walking a lot visiting the statue of [place]. Nephrology consulted for hyponatremia.     Hyponatremia  Continues to improve.  Pt denied hx of known hyponatremia however Na has been low since admission  Likely SIADH.  Free water restriction <1L/day - pt educated  Na tab as ordered   Monitor Na  Pending urine osmo; Urine Na 50.  Avoid overcorrection, na should not correct >8meq in 24 hrs

## 2023-08-14 NOTE — DIETITIAN INITIAL EVALUATION ADULT - OTHER INFO
67 yo female with PMH lung ca, DM 2 recently diagnosed admitted with evaluation of shortness of breath and cough clinical presentation consistent with worsening disease burden, no pneumonia or PE on CTA    Pt seen and reports 50% intake of meals with No GI distress (nausea/vomiting/diarrhea/constipation.) at present. Offered supplement Glucerna Therapeutic Nutrition 8oz. 3x daily (~660 Kcals, ~30 gm Protein) and pt amenable to it. Noted skin ecchymosis, no edema per nursing flow sheet. Labs reviewed. A1c- 9.0% (H).

## 2023-08-14 NOTE — DIETITIAN INITIAL EVALUATION ADULT - NS FNS DIET ORDER
Diet, NPO after Midnight:      NPO Start Date: 14-Aug-2023,   NPO Start Time: 23:59  Except Medications (08-14-23 @ 08:30)

## 2023-08-14 NOTE — PROGRESS NOTE ADULT - SUBJECTIVE AND OBJECTIVE BOX
[facility] [place] [facility]   MD BASIA BOWLES MD KRISTINE SOLTANPOUR, DO ANGELA WONG, PA    TEL:  OFFICE: [phone]  From 5pm-7am Answering Service [phone]    -- RENAL FOLLOW UP NOTE ---Date of Service 08-14-23 @ 12:25    Patient is a 68y old  Female who presents with a chief complaint of shortness of breath (14 Aug 2023 11:48)      Patient seen and examined at bedside. No chest pain/sob    VITALS:  T(F): 97.9 (08-14-23 @ 05:45), Max: 98.5 (08-13-23 @ 21:06)  HR: 96 (08-14-23 @ 05:45)  BP: 106/61 (08-14-23 @ 05:45)  RR: 18 (08-14-23 @ 05:45)  SpO2: 99% (08-14-23 @ 05:45)  Wt(kg): --        PHYSICAL EXAM:  General: NAD  Neck: No JVD  Respiratory: CTAB, no wheezes, rales or rhonchi  Cardiovascular: S1, S2, RRR  Gastrointestinal: BS+, soft, NT/ND  Extremities: No peripheral edema    Hospital Medications:   MEDICATIONS  (STANDING):  cefTRIAXone   IVPB      cefTRIAXone   IVPB 1000 milliGRAM(s) IV Intermittent every 24 hours  dextrose 5%. 1000 milliLiter(s) (50 mL/Hr) IV Continuous <Continuous>  dextrose 5%. 1000 milliLiter(s) (100 mL/Hr) IV Continuous <Continuous>  dextrose 50% Injectable 12.5 Gram(s) IV Push once  dextrose 50% Injectable 25 Gram(s) IV Push once  dextrose 50% Injectable 25 Gram(s) IV Push once  glucagon  Injectable 1 milliGRAM(s) IntraMuscular once  insulin glargine Injectable (LANTUS) 8 Unit(s) SubCutaneous at bedtime  insulin lispro (ADMELOG) corrective regimen sliding scale   SubCutaneous three times a day before meals  insulin lispro (ADMELOG) corrective regimen sliding scale   SubCutaneous at bedtime  ketoconazole 2% Cream 1 Application(s) Topical two times a day  polyethylene glycol 3350 17 Gram(s) Oral daily  senna 2 Tablet(s) Oral at bedtime  sodium chloride 1 Gram(s) Oral three times a day  tiotropium 2.5 MICROgram(s) Inhaler 2 Puff(s) Inhalation daily      LABS:  08-14    133<L>  |  96<L>  |  8   ----------------------------<  190<H>  4.3   |  27  |  0.61    Ca    10.0      14 Aug 2023 04:00  Phos  2.9     08-14  Mg     2.00     08-14      Creatinine Trend: 0.61 <--, 0.54 <--, 0.57 <--, 0.54 <--, 0.60 <--, 0.50 <--    Phosphorus: 2.9 mg/dL (08-14 @ 04:00)                              10.8   14.47 )-----------( 666      ( 14 Aug 2023 04:00 )             34.2     Urine Studies:  Urinalysis - [08-14-23 @ 04:00]      Color  / Appearance  / SG  / pH       Gluc 190 / Ketone   / Bili  / Urobili        Blood  / Protein  / Leuk Est  / Nitrite       RBC  / WBC  / Hyaline  / Gran  / Sq Epi  / Non Sq Epi  / Bacteria     Urine Sodium 50      [08-13-23 @ 06:36]    TSH 1.48      [08-07-23 @ 10:58]    HCV 0.13, Nonreact      [08-08-23 @ 05:15]      RADIOLOGY & ADDITIONAL STUDIES:

## 2023-08-15 NOTE — PROGRESS NOTE ADULT - ASSESSMENT
This is a 68 year old female with stage IV large cell neuroendocrine cancer who presents with dyspnea and cough.    1. Stage IV large cell neuroendocrine cancer   -- Involving lungs, bones, and lymph nodes   -- Currently on treatment with pemetrexed and pembrolizumab (LD 07/21)   -- No systemic treatment while admitted  -- Follow up with Dr. Rodriguez at [facility] [place] after discharge     2. Shortness of breath   -- Secondary to disease burden   -- CTA chest w/o PE; extensive pulm mets and mediastinal/hilar lymphadenopathy; T10 lytic lesion; extrinsic compression of RLL bronchus   -- s/p bronchoscopy today 08/15 with interventional pulm, await findings/report   -- On abx for post-obstructive pneumonia, 2L NC  -- PET/CT from 07/17 with right hilar mass, numerous pulmonary mets, thoracic and mediastinal lymphadenopathy, pleural mets, and osseous mets (L5, left hemisacrum, left anterior 6th rib, right prox humeral shaft, left posterior arch of C1)   -- Continue supplemental O2 as needed, wean as tolerated     3. Osseous mets   -- Neurosurgery consulted for T10 lytic lesion  -- Bone scan with findings suspicious for osseous mets involving mid thoracic spine, L5, midshaft of R femur; metastatic disease vs post-traumatic changes in L mid and R lower ribs   -- MRI T/L spine with multiple thoracic vertebral mets within T10-T11, and T7 and T10 transverse processes; indeterminate L5 vertebral lesion; no evidence of cord compromise   -- Ortho consulted, R femur lesion not concerning for mets  -- Continue pain control     4. Hyponatremia   -- Likely SIADH   -- Nephrology consulted, appreciate recs   -- improving     Will continue to follow.    Adelaide Sampson PA-C  Hematology/Oncology  [facility]  [phone] (office)  [phone] (alt office)  Evenings and weekends please call MD on call or office

## 2023-08-15 NOTE — PROGRESS NOTE ADULT - SUBJECTIVE AND OBJECTIVE BOX
[facility] [place] [facility]ACT[facility]   MD BASIA BOWLES MD KRISTINE SOLTANPOUR, SARAH ELMORE    TEL:  OFFICE: [phone]  From 5pm-7am Answering Service [phone]    -- RENAL FOLLOW UP NOTE ---Date of Service 08-15-23 @ 16:48    Patient is a 68y old  Female who presents with a chief complaint of shortness of breath (15 Aug 2023 14:09)      Patient seen and examined at bedside. No chest pain/sob    VITALS:  T(F): 97.6 (08-15-23 @ 12:56), Max: 98.5 (08-14-23 @ 21:30)  HR: 95 (08-15-23 @ 12:56)  BP: 109/56 (08-15-23 @ 12:56)  RR: 18 (08-15-23 @ 12:56)  SpO2: 98% (08-15-23 @ 12:56)  Wt(kg): --    08-14 @ 07:01  -  08-15 @ 07:00  --------------------------------------------------------  IN: 360 mL / OUT: 0 mL / NET: 360 mL    08-15 @ 07:01  -  08-15 @ 16:48  --------------------------------------------------------  IN: 240 mL / OUT: 1 mL / NET: 239 mL      Height (cm): 157.5 (08-15 @ 06:15)  Weight (kg): 50.4 (08-15 @ 06:15)  BMI (kg/m2): 20.3 (08-15 @ 06:15)  BSA (m2): 1.49 (08-15 @ 06:15)    PHYSICAL EXAM:  General: NAD  Neck: No JVD  Respiratory: CTAB, no wheezes, rales or rhonchi  Cardiovascular: S1, S2, RRR  Gastrointestinal: BS+, soft, NT/ND  Extremities: No peripheral edema    Hospital Medications:   MEDICATIONS  (STANDING):  albuterol/ipratropium for Nebulization. 3 milliLiter(s) Nebulizer once  cefTRIAXone   IVPB      cefTRIAXone   IVPB 1000 milliGRAM(s) IV Intermittent every 24 hours  dextrose 5%. 1000 milliLiter(s) (100 mL/Hr) IV Continuous <Continuous>  dextrose 5%. 1000 milliLiter(s) (50 mL/Hr) IV Continuous <Continuous>  dextrose 50% Injectable 25 Gram(s) IV Push once  dextrose 50% Injectable 12.5 Gram(s) IV Push once  dextrose 50% Injectable 25 Gram(s) IV Push once  glucagon  Injectable 1 milliGRAM(s) IntraMuscular once  insulin glargine Injectable (LANTUS) 8 Unit(s) SubCutaneous at bedtime  insulin lispro (ADMELOG) corrective regimen sliding scale   SubCutaneous three times a day before meals  insulin lispro (ADMELOG) corrective regimen sliding scale   SubCutaneous at bedtime  ketoconazole 2% Cream 1 Application(s) Topical two times a day  polyethylene glycol 3350 17 Gram(s) Oral daily  senna 2 Tablet(s) Oral at bedtime  tiotropium 2.5 MICROgram(s) Inhaler 2 Puff(s) Inhalation daily      LABS:  08-15    132<L>  |  95<L>  |  8   ----------------------------<  124<H>  3.9   |  24  |  0.53    Ca    10.2      15 Aug 2023 03:30  Phos  2.8     08-15  Mg     2.30     08-15      Creatinine Trend: 0.53 <--, 0.61 <--, 0.54 <--, 0.57 <--, 0.54 <--, 0.60 <--    Phosphorus: 2.8 mg/dL (08-15 @ 03:30)                              10.5   14.96 )-----------( 614      ( 15 Aug 2023 03:30 )             32.8     Urine Studies:  Urinalysis - [08-15-23 @ 03:30]      Color  / Appearance  / SG  / pH       Gluc 124 / Ketone   / Bili  / Urobili        Blood  / Protein  / Leuk Est  / Nitrite       RBC  / WBC  / Hyaline  / Gran  / Sq Epi  / Non Sq Epi  / Bacteria     Urine Sodium 50      [08-13-23 @ 06:36]    TSH 1.48      [08-07-23 @ 10:58]    HCV 0.13, Nonreact      [08-08-23 @ 05:15]      RADIOLOGY & ADDITIONAL STUDIES:

## 2023-08-15 NOTE — PROGRESS NOTE ADULT - ASSESSMENT
69YO Female with stage IV large cell neuroendocrine cancer on chemotherapy, presents to the ER with dyspnea and cough, found to have extrinsic compression of RLL and pulm consulted for further recommendations.    #Stage IV large cell neuroendocrine cancer   - currently on treatment with pemetrexed and pembrolizumab q 3 weeks (last on 07/21)  - CT scan shows extensive metastatic disease as well as RLL compression from extrinsic compression of hilar mass (and possibly endobronchial lesion?) with RLL atelectasis  - per heme note, PET/CT from 07/17 showing right hilar mass, numerous pulmonary mets, thoracic and mediastinal lymphadenopathy, pleural mets, and osseous mets (L5, left hemisacrum, left anterior 6th rib, right prox humeral shaft, left posterior arch of C1  - unclear if RLL collapse is new?  - chest pain over right hemithorax is likely secondary to pleural irritation    Recommendations:  - patient with SOB in the setting of pulm mets and RLL collapse  - given immunocompromised state and elevate wbc count with RLL collapse; concern for post obstructive PNA and c/w Ceftriaxone  - would be helpful to view images from [facility] --> please obtain if possible  - patient with extensive emphysema and likely has COPD (never been formally diagnosed); no wheezing on exam  - C/w Spiriva    - Plan for Bronchoscopy today - Planned for Flexible Bronchoscopy today with Tumor debulking, possible balloon dilation, possible argon plasma coagulation, possible cryoablation, possible stent placement, bronchoalveolar lavage.    - coughing and phlegm with specks of block is normal after procedure.

## 2023-08-15 NOTE — PROGRESS NOTE ADULT - ASSESSMENT
69 yo female with PMH lung ca, DM 2 recently diagnosed admitted with evaluation of shortness of breath and cough clinical presentation consistent with worsening disease burden, no pneumonia or PE on CTA       Problem/Plan - 1:  ·  Problem: Cancer of lung.   ·  Plan:   likely secondary to extensive disease burden and extrinsic tumor compression of right bronchus  continue O2 as needed  oncology consultation appreciated   patient has stage 4 large cell neuroendocrine tumor.  Dermatology consult noted.      Problem/Plan - 2:  ·  Problem: Metastatic Lesion of thoracic vertebra.   ·  Plan:   CT Lumbar and cervical spine noted  bone scan positive for lesion L5 mid thoracic and right femur shaft   ortho oncology consultation devaughn by me  X ray right femur  spine ortho follow up     < from: MR Lumbar Spine w/wo IV Cont (08.11.23 @ 10:43) >  IMPRESSION:    1. THORACIC SPINE:   Multiple thoracic vertebral metastases include   within the T10 and T11 vertebral bodies and the T7 and T10 left   transverse processes. No thoracic canal compromise. Thoracic cord contact    2. LUMBAR SPINE:   L5 vertebral lesion is indeterminate, vertebral   metastasis versus hemangioma bone with atypical imaging properties.  S2   metastasis. No lumbar canal compromise. Conus and cauda equina are intact    3. Pulmonarymetastases. Mediastinal lymph node metastases    Management per oncology .      Problem/Plan - 3:  ·  Problem: Post Obstruction Pneumonia with acute respiratory failure with hypoxia with COPD  .   ·  Plan: likely secondary to malignancy .   S/P Bronchoscopy and stent  Abxs per Pulmonary .  < from: CT Angio Chest PE Protocol w/ IV Cont (08.07.23 @ 05:01) >  IMPRESSION:  No pulmonary embolism.    Extensive bilateral pulmonary metastases and confluent mediastinal/hilar   adenopathy. Likely lymphangitic spread of tumor. Lytic lesion in the T10   vertebral body suspicious for bony metastasis. Nonspecific left adrenal   gland thickening. Extrinsic compression on right lower lobe bronchus with   right lower lobe subsegmental atelectasis.  Findings are concerning for   bronchogenic malignancy.  Consider small cell carcinoma.    < end of copied text >     Problem/Plan - 4:  ·  Problem: DM (diabetes mellitus).   ·  Plan: finger sticks with short acting insulin sliding scale  will continue Lantus  8 units.

## 2023-08-15 NOTE — PROGRESS NOTE ADULT - SUBJECTIVE AND OBJECTIVE BOX
Patient is a 68y old  Female who presents with a chief complaint of shortness of breath (15 Aug 2023 08:10)    Patient seen this afternoon. She is s/p bronchoscopy. Currently feels well and denies chest pain/dyspnea. Wants to go home.     MEDICATIONS  (STANDING):  albuterol/ipratropium for Nebulization. 3 milliLiter(s) Nebulizer once  cefTRIAXone   IVPB      cefTRIAXone   IVPB 1000 milliGRAM(s) IV Intermittent every 24 hours  dextrose 5%. 1000 milliLiter(s) (100 mL/Hr) IV Continuous <Continuous>  dextrose 5%. 1000 milliLiter(s) (50 mL/Hr) IV Continuous <Continuous>  dextrose 50% Injectable 25 Gram(s) IV Push once  dextrose 50% Injectable 12.5 Gram(s) IV Push once  dextrose 50% Injectable 25 Gram(s) IV Push once  glucagon  Injectable 1 milliGRAM(s) IntraMuscular once  insulin glargine Injectable (LANTUS) 8 Unit(s) SubCutaneous at bedtime  insulin lispro (ADMELOG) corrective regimen sliding scale   SubCutaneous three times a day before meals  insulin lispro (ADMELOG) corrective regimen sliding scale   SubCutaneous at bedtime  ketoconazole 2% Cream 1 Application(s) Topical two times a day  polyethylene glycol 3350 17 Gram(s) Oral daily  senna 2 Tablet(s) Oral at bedtime  tiotropium 2.5 MICROgram(s) Inhaler 2 Puff(s) Inhalation daily    MEDICATIONS  (PRN):  acetaminophen     Tablet .. 650 milliGRAM(s) Oral every 6 hours PRN Temp greater or equal to 38C (100.4F), Mild Pain (1 - 3)  albuterol/ipratropium for Nebulization 3 milliLiter(s) Nebulizer every 6 hours PRN Shortness of Breath and/or Wheezing  dextrose Oral Gel 15 Gram(s) Oral once PRN Blood Glucose LESS THAN 70 milliGRAM(s)/deciliter        Vital Signs Last 24 Hrs  T(C): 36.4 (15 Aug 2023 12:56), Max: 36.9 (14 Aug 2023 21:30)  T(F): 97.6 (15 Aug 2023 12:56), Max: 98.5 (14 Aug 2023 21:30)  HR: 95 (15 Aug 2023 12:56) (89 - 112)  BP: 109/56 (15 Aug 2023 12:56) (109/53 - 163/87)  BP(mean): 82 (15 Aug 2023 12:00) (76 - 108)  RR: 18 (15 Aug 2023 12:56) (16 - 89)  SpO2: 98% (15 Aug 2023 12:56) (83% - 100%)    Parameters below as of 15 Aug 2023 12:56  Patient On (Oxygen Delivery Method): room air  O2 Flow (L/min): 2      PE  NAD  Awake, alert  Anicteric, MMM  No c/c/e  No rash grossly  FROM                          10.5   14.96 )-----------( 614      ( 15 Aug 2023 03:30 )             32.8       08-15    132<L>  |  95<L>  |  8   ----------------------------<  124<H>  3.9   |  24  |  0.53    Ca    10.2      15 Aug 2023 03:30  Phos  2.8     08-15  Mg     2.30     08-15

## 2023-08-15 NOTE — PROGRESS NOTE ADULT - SUBJECTIVE AND OBJECTIVE BOX
Pulmonary Consult Follow up     Interval Events:    Feeling well this morning.    Planned for Flexible Bronchoscopy today with Tumor debulking, possible balloon dilation, possible argon plasma coagulation, possible cryoablation, possible stent placement, bronchoalveolar lavage.       REVIEW OF SYSTEMS:  [x] All other systems negative except per HPI   [ ] Unable to assess ROS because ________    OBJECTIVE:  ICU Vital Signs Last 24 Hrs  T(C): 36.6 (15 Aug 2023 07:23), Max: 36.9 (14 Aug 2023 21:30)  T(F): 97.9 (15 Aug 2023 07:23), Max: 98.5 (14 Aug 2023 21:30)  HR: 101 (15 Aug 2023 07:23) (89 - 101)  BP: 122/66 (15 Aug 2023 07:23) (102/63 - 122/66)  BP(mean): --  ABP: --  ABP(mean): --  RR: 18 (15 Aug 2023 07:23) (18 - 89)  SpO2: 95% (15 Aug 2023 07:23) (95% - 100%)    O2 Parameters below as of 15 Aug 2023 07:23  Patient On (Oxygen Delivery Method): nasal cannula  O2 Flow (L/min): 2            08-14 @ 07:01  -  08-15 @ 07:00  --------------------------------------------------------  IN: 360 mL / OUT: 2 mL / NET: 358 mL        PHYSICAL EXAM:  GENERAL: NAD   HEAD:  Atraumatic, Normocephalic  EYES: EOMI, conjunctiva and sclera clear  ENMT: Moist mucous membranes  CHEST/LUNG: Decreased breath sounds right side   HEART: Regular rate and rhythm; No murmurs, rubs, or gallops  ABDOMEN: Nondistended  VASCULAR: No  cyanosis, or edema  SKIN: No rashes or lesions  NERVOUS SYSTEM:  Alert & Oriented X3, Good concentration    HOSPITAL MEDICATIONS:  MEDICATIONS  (STANDING):  cefTRIAXone   IVPB      cefTRIAXone   IVPB 1000 milliGRAM(s) IV Intermittent every 24 hours  dextrose 5%. 1000 milliLiter(s) (100 mL/Hr) IV Continuous <Continuous>  dextrose 5%. 1000 milliLiter(s) (50 mL/Hr) IV Continuous <Continuous>  dextrose 50% Injectable 25 Gram(s) IV Push once  dextrose 50% Injectable 12.5 Gram(s) IV Push once  dextrose 50% Injectable 25 Gram(s) IV Push once  glucagon  Injectable 1 milliGRAM(s) IntraMuscular once  insulin glargine Injectable (LANTUS) 8 Unit(s) SubCutaneous at bedtime  insulin lispro (ADMELOG) corrective regimen sliding scale   SubCutaneous three times a day before meals  insulin lispro (ADMELOG) corrective regimen sliding scale   SubCutaneous at bedtime  ketoconazole 2% Cream 1 Application(s) Topical two times a day  polyethylene glycol 3350 17 Gram(s) Oral daily  senna 2 Tablet(s) Oral at bedtime  sodium chloride 1 Gram(s) Oral three times a day  tiotropium 2.5 MICROgram(s) Inhaler 2 Puff(s) Inhalation daily    MEDICATIONS  (PRN):  acetaminophen     Tablet .. 650 milliGRAM(s) Oral every 6 hours PRN Temp greater or equal to 38C (100.4F), Mild Pain (1 - 3)  albuterol/ipratropium for Nebulization 3 milliLiter(s) Nebulizer every 6 hours PRN Shortness of Breath and/or Wheezing  dextrose Oral Gel 15 Gram(s) Oral once PRN Blood Glucose LESS THAN 70 milliGRAM(s)/deciliter      LABS:  08-15    132<L>  |  95<L>  |  8   ----------------------------<  124<H>  3.9   |  24  |  0.53  08-14    133<L>  |  96<L>  |  8   ----------------------------<  190<H>  4.3   |  27  |  0.61  08-13    131<L>  |  93<L>  |  6<L>  ----------------------------<  120<H>  4.0   |  27  |  0.54    Ca    10.2      15 Aug 2023 03:30  Ca    10.0      14 Aug 2023 04:00  Ca    10.4      13 Aug 2023 07:50  Phos  2.8     08-15  Mg     2.30     08-15      Magnesium: 2.30 mg/dL (08-15-23 @ 03:30)  Magnesium: 2.00 mg/dL (08-14-23 @ 04:00)  Magnesium: 2.00 mg/dL (08-13-23 @ 07:50)    Phosphorus: 2.8 mg/dL (08-15-23 @ 03:30)  Phosphorus: 2.9 mg/dL (08-14-23 @ 04:00)  Phosphorus: 3.1 mg/dL (08-13-23 @ 07:50)      PT/INR - ( 15 Aug 2023 03:30 )   PT: 11.9 sec;   INR: 1.05 ratio         PTT - ( 15 Aug 2023 03:30 )  PTT:28.7 sec              Urinalysis Basic - ( 15 Aug 2023 03:30 )    Color: x / Appearance: x / SG: x / pH: x  Gluc: 124 mg/dL / Ketone: x  / Bili: x / Urobili: x   Blood: x / Protein: x / Nitrite: x   Leuk Esterase: x / RBC: x / WBC x   Sq Epi: x / Non Sq Epi: x / Bacteria: x                              10.5   14.96 )-----------( 614      ( 15 Aug 2023 03:30 )             32.8                         10.8   14.47 )-----------( 666      ( 14 Aug 2023 04:00 )             34.2                         11.1   13.18 )-----------( 667      ( 13 Aug 2023 07:50 )             34.7     CAPILLARY BLOOD GLUCOSE      POCT Blood Glucose.: 106 mg/dL (15 Aug 2023 05:24)  POCT Blood Glucose.: 280 mg/dL (14 Aug 2023 21:32)  POCT Blood Glucose.: 141 mg/dL (14 Aug 2023 17:43)  POCT Blood Glucose.: 153 mg/dL (14 Aug 2023 12:51)  POCT Blood Glucose.: 152 mg/dL (14 Aug 2023 08:46)

## 2023-08-15 NOTE — PROGRESS NOTE ADULT - ASSESSMENT
69 yo female with PMH lung Ca on CT (last CT 3 weeks ago), DM for evaluation of shortness of breath and cough x 1 week, acutely worsened yesterday after walking a lot visiting the statue [place]. Nephrology consulted for hyponatremia.     Hyponatremia  Continues to improve.  Pt denied hx of known hyponatremia however Na has been low since admission  Likely SIADH.  Free water restriction <1L/day - pt educated  completed na tid x4 days Na still low will resume na tab bid  Monitor Na  Pending urine osmo; Urine Na 50.  Avoid overcorrection, na should not correct >8meq in 24 hrs

## 2023-08-15 NOTE — PROGRESS NOTE ADULT - SUBJECTIVE AND OBJECTIVE BOX
Date of Service  : 08-15-23 @ 14:09    INTERVAL HPI/OVERNIGHT EVENTS: I feel fine. Was off oxygen   Vital Signs Last 24 Hrs  T(C): 36.4 (15 Aug 2023 12:56), Max: 36.9 (14 Aug 2023 21:30)  T(F): 97.6 (15 Aug 2023 12:56), Max: 98.5 (14 Aug 2023 21:30)  HR: 95 (15 Aug 2023 12:56) (89 - 112)  BP: 109/56 (15 Aug 2023 12:56) (109/53 - 163/87)  BP(mean): 82 (15 Aug 2023 12:00) (76 - 108)  RR: 18 (15 Aug 2023 12:56) (16 - 89)  SpO2: 98% (15 Aug 2023 12:56) (83% - 100%)    Parameters below as of 15 Aug 2023 12:56  Patient On (Oxygen Delivery Method): room air  O2 Flow (L/min): 2    I&O's Summary    14 Aug 2023 07:01  -  15 Aug 2023 07:00  --------------------------------------------------------  IN: 360 mL / OUT: 2 mL / NET: 358 mL      MEDICATIONS  (STANDING):  albuterol/ipratropium for Nebulization. 3 milliLiter(s) Nebulizer once  cefTRIAXone   IVPB      cefTRIAXone   IVPB 1000 milliGRAM(s) IV Intermittent every 24 hours  dextrose 5%. 1000 milliLiter(s) (100 mL/Hr) IV Continuous <Continuous>  dextrose 5%. 1000 milliLiter(s) (50 mL/Hr) IV Continuous <Continuous>  dextrose 50% Injectable 25 Gram(s) IV Push once  dextrose 50% Injectable 12.5 Gram(s) IV Push once  dextrose 50% Injectable 25 Gram(s) IV Push once  glucagon  Injectable 1 milliGRAM(s) IntraMuscular once  insulin glargine Injectable (LANTUS) 8 Unit(s) SubCutaneous at bedtime  insulin lispro (ADMELOG) corrective regimen sliding scale   SubCutaneous three times a day before meals  insulin lispro (ADMELOG) corrective regimen sliding scale   SubCutaneous at bedtime  ketoconazole 2% Cream 1 Application(s) Topical two times a day  polyethylene glycol 3350 17 Gram(s) Oral daily  senna 2 Tablet(s) Oral at bedtime  tiotropium 2.5 MICROgram(s) Inhaler 2 Puff(s) Inhalation daily    MEDICATIONS  (PRN):  acetaminophen     Tablet .. 650 milliGRAM(s) Oral every 6 hours PRN Temp greater or equal to 38C (100.4F), Mild Pain (1 - 3)  albuterol/ipratropium for Nebulization 3 milliLiter(s) Nebulizer every 6 hours PRN Shortness of Breath and/or Wheezing  dextrose Oral Gel 15 Gram(s) Oral once PRN Blood Glucose LESS THAN 70 milliGRAM(s)/deciliter    LABS:                        10.5   14.96 )-----------( 614      ( 15 Aug 2023 03:30 )             32.8     08-15    132<L>  |  95<L>  |  8   ----------------------------<  124<H>  3.9   |  24  |  0.53    Ca    10.2      15 Aug 2023 03:30  Phos  2.8     08-15  Mg     2.30     08-15      PT/INR - ( 15 Aug 2023 03:30 )   PT: 11.9 sec;   INR: 1.05 ratio         PTT - ( 15 Aug 2023 03:30 )  PTT:28.7 sec  Urinalysis Basic - ( 15 Aug 2023 03:30 )    Color: x / Appearance: x / SG: x / pH: x  Gluc: 124 mg/dL / Ketone: x  / Bili: x / Urobili: x   Blood: x / Protein: x / Nitrite: x   Leuk Esterase: x / RBC: x / WBC x   Sq Epi: x / Non Sq Epi: x / Bacteria: x      CAPILLARY BLOOD GLUCOSE      POCT Blood Glucose.: 153 mg/dL (15 Aug 2023 11:29)  POCT Blood Glucose.: 106 mg/dL (15 Aug 2023 05:24)  POCT Blood Glucose.: 280 mg/dL (14 Aug 2023 21:32)  POCT Blood Glucose.: 141 mg/dL (14 Aug 2023 17:43)        Urinalysis Basic - ( 15 Aug 2023 03:30 )    Color: x / Appearance: x / SG: x / pH: x  Gluc: 124 mg/dL / Ketone: x  / Bili: x / Urobili: x   Blood: x / Protein: x / Nitrite: x   Leuk Esterase: x / RBC: x / WBC x   Sq Epi: x / Non Sq Epi: x / Bacteria: x      REVIEW OF SYSTEMS:  CONSTITUTIONAL: No fever, weight loss, or fatigue  EYES: No eye pain, visual disturbances, or discharge  ENMT:  No difficulty hearing, tinnitus, vertigo; No sinus or throat pain  NECK: No pain or stiffness  RESPIRATORY: No cough, wheezing, chills or hemoptysis; No shortness of breath  CARDIOVASCULAR: No chest pain, palpitations, dizziness, or leg swelling  GASTROINTESTINAL: No abdominal or epigastric pain. No nausea, vomiting, or hematemesis; No diarrhea or constipation. No melena or hematochezia.  GENITOURINARY: No dysuria, frequency, hematuria, or incontinence  NEUROLOGICAL: No headaches, memory loss, loss of strength, numbness, or tremors      Consultant(s) Notes Reviewed:  [x ] YES  [ ] NO    PHYSICAL EXAM:  GENERAL: NAD, well-groomed, well-developed,not in any distress ,  HEAD:  Atraumatic, Normocephalic  EYES: EOMI, PERRLA, conjunctiva and sclera clear  ENMT: No tonsillar erythema, exudates, or enlargement; Moist mucous membranes, Good dentition, No lesions  NECK: Supple, No JVD, Normal thyroid  NERVOUS SYSTEM:  Alert & Oriented X3, No focal deficit   CHEST/LUNG: Good air entry bilateral   HEART: Regular rate and rhythm; No murmurs, rubs, or gallops  ABDOMEN: Soft, Nontender, Nondistended; Bowel sounds present  EXTREMITIES:  2+ Peripheral Pulses, No clubbing, cyanosis, or edema    Care Discussed with Consultants/Other Providers [ x] YES  [ ] NO

## 2023-08-16 NOTE — DISCHARGE NOTE PROVIDER - NSDCHC_MEDRECSTATUS_GEN_ALL_CORE
Admission Reconciliation is Not Complete  Discharge Reconciliation is Not Complete I attest my time as attending is greater than 50% of the total combined time spent on qualifying patient care activities by the PA/NP and attending. Admission Reconciliation is Not Complete  Discharge Reconciliation is Completed

## 2023-08-16 NOTE — PROGRESS NOTE ADULT - SUBJECTIVE AND OBJECTIVE BOX
Patient is a 68y old  Female who presents with a chief complaint of shortness of breath (16 Aug 2023 11:27)    Pt seen this afternoon. She feels well and has no complaints.     MEDICATIONS  (STANDING):  dextrose 5%. 1000 milliLiter(s) (100 mL/Hr) IV Continuous <Continuous>  dextrose 5%. 1000 milliLiter(s) (50 mL/Hr) IV Continuous <Continuous>  dextrose 50% Injectable 25 Gram(s) IV Push once  dextrose 50% Injectable 12.5 Gram(s) IV Push once  dextrose 50% Injectable 25 Gram(s) IV Push once  glucagon  Injectable 1 milliGRAM(s) IntraMuscular once  insulin glargine Injectable (LANTUS) 8 Unit(s) SubCutaneous at bedtime  insulin lispro (ADMELOG) corrective regimen sliding scale   SubCutaneous three times a day before meals  insulin lispro (ADMELOG) corrective regimen sliding scale   SubCutaneous at bedtime  ketoconazole 2% Cream 1 Application(s) Topical two times a day  polyethylene glycol 3350 17 Gram(s) Oral daily  senna 2 Tablet(s) Oral at bedtime  sodium chloride 1 Gram(s) Oral two times a day  tiotropium 2.5 MICROgram(s) Inhaler 2 Puff(s) Inhalation daily    MEDICATIONS  (PRN):  acetaminophen     Tablet .. 650 milliGRAM(s) Oral every 6 hours PRN Temp greater or equal to 38C (100.4F), Mild Pain (1 - 3)  albuterol/ipratropium for Nebulization 3 milliLiter(s) Nebulizer every 6 hours PRN Shortness of Breath and/or Wheezing  dextrose Oral Gel 15 Gram(s) Oral once PRN Blood Glucose LESS THAN 70 milliGRAM(s)/deciliter        Vital Signs Last 24 Hrs  T(C): 36.7 (16 Aug 2023 05:04), Max: 36.7 (15 Aug 2023 17:30)  T(F): 98.1 (16 Aug 2023 05:04), Max: 98.1 (15 Aug 2023 21:30)  HR: 91 (16 Aug 2023 05:04) (90 - 92)  BP: 123/63 (16 Aug 2023 05:04) (118/61 - 134/62)  BP(mean): --  RR: 18 (16 Aug 2023 13:02) (16 - 18)  SpO2: 95% (16 Aug 2023 13:02) (86% - 100%)    Parameters below as of 16 Aug 2023 13:02  Patient On (Oxygen Delivery Method): nasal cannula  O2 Flow (L/min): 1      PE  NAD  Awake, alert  Anicteric, MMM  No c/c/e  No rash grossly  FROM                          9.8    16.77 )-----------( 562      ( 16 Aug 2023 04:15 )             31.2       08-16    131<L>  |  97<L>  |  10  ----------------------------<  137<H>  3.9   |  24  |  0.60    Ca    9.6      16 Aug 2023 04:15  Phos  2.9     08-16  Mg     2.40     08-16

## 2023-08-16 NOTE — PROGRESS NOTE ADULT - SUBJECTIVE AND OBJECTIVE BOX
Date of Service  : 08-16-23     INTERVAL HPI/OVERNIGHT EVENTS: I feel fine. When can I go home.   Vital Signs Last 24 Hrs  T(C): 36.7 (16 Aug 2023 17:30), Max: 36.7 (15 Aug 2023 21:30)  T(F): 98.1 (16 Aug 2023 17:30), Max: 98.1 (15 Aug 2023 21:30)  HR: 97 (16 Aug 2023 17:30) (65 - 97)  BP: 128/68 (16 Aug 2023 17:30) (113/65 - 128/68)  BP(mean): --  RR: 18 (16 Aug 2023 17:30) (18 - 18)  SpO2: 98% (16 Aug 2023 17:30) (86% - 100%)    Parameters below as of 16 Aug 2023 17:30  Patient On (Oxygen Delivery Method): nasal cannula  O2 Flow (L/min): 1    I&O's Summary    15 Aug 2023 07:01  -  16 Aug 2023 07:00  --------------------------------------------------------  IN: 620 mL / OUT: 4 mL / NET: 616 mL      MEDICATIONS  (STANDING):  dextrose 5%. 1000 milliLiter(s) (100 mL/Hr) IV Continuous <Continuous>  dextrose 5%. 1000 milliLiter(s) (50 mL/Hr) IV Continuous <Continuous>  dextrose 50% Injectable 25 Gram(s) IV Push once  dextrose 50% Injectable 12.5 Gram(s) IV Push once  dextrose 50% Injectable 25 Gram(s) IV Push once  glucagon  Injectable 1 milliGRAM(s) IntraMuscular once  heparin   Injectable 5000 Unit(s) SubCutaneous every 12 hours  insulin glargine Injectable (LANTUS) 8 Unit(s) SubCutaneous at bedtime  insulin lispro (ADMELOG) corrective regimen sliding scale   SubCutaneous three times a day before meals  insulin lispro (ADMELOG) corrective regimen sliding scale   SubCutaneous at bedtime  ketoconazole 2% Cream 1 Application(s) Topical two times a day  polyethylene glycol 3350 17 Gram(s) Oral daily  senna 2 Tablet(s) Oral at bedtime  sodium chloride 1 Gram(s) Oral two times a day  tiotropium 2.5 MICROgram(s) Inhaler 2 Puff(s) Inhalation daily    MEDICATIONS  (PRN):  acetaminophen     Tablet .. 650 milliGRAM(s) Oral every 6 hours PRN Temp greater or equal to 38C (100.4F), Mild Pain (1 - 3)  albuterol/ipratropium for Nebulization 3 milliLiter(s) Nebulizer every 6 hours PRN Shortness of Breath and/or Wheezing  dextrose Oral Gel 15 Gram(s) Oral once PRN Blood Glucose LESS THAN 70 milliGRAM(s)/deciliter    LABS:                        9.8    16.77 )-----------( 562      ( 16 Aug 2023 04:15 )             31.2     08-16    131<L>  |  97<L>  |  10  ----------------------------<  137<H>  3.9   |  24  |  0.60    Ca    9.6      16 Aug 2023 04:15  Phos  2.9     08-16  Mg     2.40     08-16      PT/INR - ( 15 Aug 2023 03:30 )   PT: 11.9 sec;   INR: 1.05 ratio         PTT - ( 15 Aug 2023 03:30 )  PTT:28.7 sec  Urinalysis Basic - ( 16 Aug 2023 04:15 )    Color: x / Appearance: x / SG: x / pH: x  Gluc: 137 mg/dL / Ketone: x  / Bili: x / Urobili: x   Blood: x / Protein: x / Nitrite: x   Leuk Esterase: x / RBC: x / WBC x   Sq Epi: x / Non Sq Epi: x / Bacteria: x      CAPILLARY BLOOD GLUCOSE      POCT Blood Glucose.: 143 mg/dL (16 Aug 2023 17:32)  POCT Blood Glucose.: 154 mg/dL (16 Aug 2023 12:22)  POCT Blood Glucose.: 118 mg/dL (16 Aug 2023 08:47)  POCT Blood Glucose.: 211 mg/dL (15 Aug 2023 22:07)  POCT Blood Glucose.: 269 mg/dL (15 Aug 2023 20:34)  POCT Blood Glucose.: 281 mg/dL (15 Aug 2023 18:33)        Urinalysis Basic - ( 16 Aug 2023 04:15 )    Color: x / Appearance: x / SG: x / pH: x  Gluc: 137 mg/dL / Ketone: x  / Bili: x / Urobili: x   Blood: x / Protein: x / Nitrite: x   Leuk Esterase: x / RBC: x / WBC x   Sq Epi: x / Non Sq Epi: x / Bacteria: x      REVIEW OF SYSTEMS:  CONSTITUTIONAL: No fever, weight loss, or fatigue  EYES: No eye pain, visual disturbances, or discharge  ENMT:  No difficulty hearing, tinnitus, vertigo; No sinus or throat pain  NECK: No pain or stiffness  RESPIRATORY: No cough, wheezing, chills or hemoptysis; No shortness of breath  CARDIOVASCULAR: No chest pain, palpitations, dizziness, or leg swelling  GASTROINTESTINAL: No abdominal or epigastric pain. No nausea, vomiting, or hematemesis; No diarrhea or constipation. No melena or hematochezia.  GENITOURINARY: No dysuria, frequency, hematuria, or incontinence  NEUROLOGICAL: No headaches, memory loss, loss of strength, numbness, or tremors      Consultant(s) Notes Reviewed:  [x ] YES  [ ] NO    PHYSICAL EXAM:  GENERAL: NAD, well-groomed, well-developed,not in any distress ,NC oxygen   HEAD:  Atraumatic, Normocephalic  NECK: Supple, No JVD, Normal thyroid  NERVOUS SYSTEM:  Alert & Oriented X3, No focal deficit   CHEST/LUNG: Good air entry bilateral with no  rales, rhonchi, wheezing, or rubs  HEART: Regular rate and rhythm; No murmurs, rubs, or gallops  ABDOMEN: Soft, Nontender, Nondistended; Bowel sounds present  EXTREMITIES:  2+ Peripheral Pulses, No clubbing, cyanosis, or edema      Care Discussed with Consultants/Other Providers [ x] YES  [ ] NO

## 2023-08-16 NOTE — PROGRESS NOTE ADULT - ATTENDING COMMENTS
Patient seen and examined at the bedside. Right lower lobe and right middle lobe atelectasis 2/2 possible central airway obstruction. Interventional Pulm consulted for possible endobronchial intervention. Risks of the procedure including but not limited to pneumothorax and bleeding discussed, and possible interventions in case those are noted also discussed extensively. Alternative modalities of treatment as well as option to pursue surveillance imaging also discussed. After thorough risk benefit discussion and answering all questions related to the procedure, patient is agreeable to proceed with proposed intervention.
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 Found on CT chest to have diffuse large pulmonary metastatic disease with bulky confluent mediastinal and hilar lymphadenopathy causing extrinsic compression of the RLL bronchus with consequent RLL subsegmental atelectasis.   - Planned for bronchoscopy with IP tomorrow for possible debulking vs. evaluation for endobronchial stent  - NPO after midnight  - Complete 5 day course of antibiotics for pneumonia  - Continue Spiriva
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 Found on CT chest to have diffuse large pulmonary metastatic disease with bulky confluent mediastinal and hilar lymphadenopathy causing extrinsic compression of the RLL bronchus with consequent RLL subsegmental atelectasis. Plan for bronchoscopy with IP next week (likely on 8/15) for possible debulking vs. evaluation for endobronchial stent. Also with likely component of post-obstructive pneumonia given dyspnea and leukocytosis, Continue Spiriva for likely underlying COPD, especially given emphysema on CT chest. Will continue to monitor.
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 Found on CT chest to have diffuse large pulmonary metastatic disease with bulky confluent mediastinal and hilar lymphadenopathy causing extrinsic compression of the RLL bronchus with consequent RLL atelectasis s/p bronchoscopy with tumor debulking and balloon dilation.   - Patient reports symptomatic improvement post procedure  - No further inpatient intervention from pulmonary perspective  - Please arrange for home oxygen

## 2023-08-16 NOTE — DISCHARGE NOTE PROVIDER - HOSPITAL COURSE
67 yo female with PMH lung CA and DM2, recently diagnosed admitted with evaluation of shortness of breath and cough who presented with dyspnea, likely 2/2 stage IV large cell neuroendocrine tumor with lung, bone and node involvement. Patient currently on treatment with pemetrexed and pembrolizumab q 3 weeks (last on 07/21). CT scan showed extensive metastatic disease as well as RLL compression from extrinsic compression of hilar mass (and possibly endobronchial lesion?) with RLL atelectasis. Seen by pulm. iven immunocompromised state and elevate wbc count with RLL collapse; concern for post obstructive PNA and S/p Ceftriaxone 8/11-8/16. To continue medications as directed by pulm. She underwent flexible bronchoscopy with tumor debulking and balloon dilation, to follow cultures from procedure. She qualified for oxygen which has been set up. MRI T/L spine with multiple thoracic vertebral mets within T10-T11, and T7 and T10 transverse processes; indeterminate L5 vertebral lesion; no evidence of cord compromise. Ortho consulted, R femur lesion not concerning for mets. Patient may continue to follow at [facility] for continued care after discharge..   Patient seen and evaluated, no acute somatic complaints. Reviewed discharge medications with patient; All new medications requiring new prescriptions were sent to the pharmacy of patient's choice. Reviewed need for prescription for previous home medications and new prescriptions sent if requested. Medically cleared/stable for discharge as per Dr. Guerrero  with close outpatient follow up within 1-2 weeks of discharge. Patient understands and agrees with plan of care.

## 2023-08-16 NOTE — PROVIDER CONTACT NOTE (OTHER) - ACTION/TREATMENT ORDERED:
acp aware
per ACP - ok to give tylenol and pt can go off tele for transfer
team aware   no new orders
no new orders given at this time
No actions ordered. Will continue to monitor patient

## 2023-08-16 NOTE — CHART NOTE - NSCHARTNOTEFT_GEN_A_CORE
Patient's oxygen saturation resting on room air is 86%.   Patient's oxygen saturation on 2L oxygen corrects to 96%.   Patient diagnosis: stage IV large cell neuroendocrine tumor with lung, bone and node involvement.
Reference #: [id number]    Practitioner Count: 3  Pharmacy Count: 2  Current Opioid Prescriptions: 1  Current Benzodiazepine Prescriptions: 0  Current Stimulant Prescriptions: 0      Patient Demographic Information (PDI)       PDI	First Name	Last Name	Birth Date	Gender	Street Address	[facility]  A	Natasha Sykes	1955	Female	[address]	[facility]	[address]  B	Natasha Sykes	1955	Female	[address]	[facility]	[address]    Prescription Information      PDI Filter:    PDI	My Rx	Current Rx	Drug Type	Rx Written	Rx Dispensed	Drug	Quantity	Days Supply	Prescriber Name	Prescriber CHIQUITA #	Payment Method	Dispenser  A	N	Y	O	07/21/2023	07/21/2023	oxycodone hcl (ir) 5 mg tablet	120	30	Fredy Rodriguez	[id number]	[facility]  A	N	N	O	06/30/2023	06/30/2023	oxycodone hcl (ir) 5 mg tablet	40	10	Umm Roberts A	[id number]	[facility]  A	N	N	O	05/12/2023	05/12/2023	oxycodone hcl (ir) 5 mg tablet	120	30	Fredy Rodriguez	[id number]	[facility]  A	N	N	O	04/21/2023	04/21/2023	oxycodone hcl (ir) 5 mg tablet	28	7	Fredy Rodriguez	[id number]	[facility]  B	N	N	O	07/11/2023	07/12/2023	oxycodone hcl (ir) 5 mg tablet	30	10	[place]	[id number]	Medicare	[facility] [id number]
Imaging reviewed. MRI T/L spine shows multiple thoracic vertebral mets within T10-T11, and T7 and T10 transverse processes; indeterminate L5 vertebral lesion, cord intact. Given that this is not a singular metastasis with no pathologic fracture or instability, patient is unlikely candidate for surgery. No neurosurgical intervention at this time. Neurosurgery signing off. Reconsult PRN. Case d/w attending Dr. Sanabria.
case d/w / Elly recc: MRI T/Ls w/ contrast and bone scan.

## 2023-08-16 NOTE — DISCHARGE NOTE PROVIDER - NSDCCPCAREPLAN_GEN_ALL_CORE_FT
PRINCIPAL DISCHARGE DIAGNOSIS  Diagnosis: Shortness of breath  Assessment and Plan of Treatment: You presented with shortness of breath. You were treated with antibiotics. You underwent flexible bronchoscopy with tumor debulking and balloon dilation, you will need to follow up your cultures from this procedure. Follow up with pulmonary team. You are being discharged on home oxygen. Of note, your MRI of your spine showed multiple thoracic vertebral metastasis within T10-T11, and T7 and T10 transverse processes. You will need to continue to follow at [facility] for continued care after discharge.

## 2023-08-16 NOTE — PROGRESS NOTE ADULT - SUBJECTIVE AND OBJECTIVE BOX
CHIEF COMPLAINT:    Interval Events:    REVIEW OF SYSTEMS:  Constitutional: [ ] negative [ ] fevers [ ] chills [ ] weight loss [ ] weight gain  HEENT: [ ] negative [ ] dry eyes [ ] eye irritation [ ] postnasal drip [ ] nasal congestion  CV: [ ] negative  [ ] chest pain [ ] orthopnea [ ] palpitations [ ] murmur  Resp: [ ] negative [ ] cough [ ] shortness of breath [ ] dyspnea [ ] wheezing [ ] sputum [ ] hemoptysis  GI: [ ] negative [ ] nausea [ ] vomiting [ ] diarrhea [ ] constipation [ ] abd pain [ ] dysphagia   : [ ] negative [ ] dysuria [ ] nocturia [ ] hematuria [ ] increased urinary frequency  Musculoskeletal: [ ] negative [ ] back pain [ ] myalgias [ ] arthralgias [ ] fracture  Skin: [ ] negative [ ] rash [ ] itch  Neurological: [ ] negative [ ] headache [ ] dizziness [ ] syncope [ ] weakness [ ] numbness  Psychiatric: [ ] negative [ ] anxiety [ ] depression  Endocrine: [ ] negative [ ] diabetes [ ] thyroid problem  Hematologic/Lymphatic: [ ] negative [ ] anemia [ ] bleeding problem  Allergic/Immunologic: [ ] negative [ ] itchy eyes [ ] nasal discharge [ ] hives [ ] angioedema  [ ] All other systems negative  [ ] Unable to assess ROS because ________    OBJECTIVE:  ICU Vital Signs Last 24 Hrs  T(C): 36.7 (16 Aug 2023 05:04), Max: 36.7 (15 Aug 2023 17:30)  T(F): 98.1 (16 Aug 2023 05:04), Max: 98.1 (15 Aug 2023 21:30)  HR: 91 (16 Aug 2023 05:04) (90 - 112)  BP: 123/63 (16 Aug 2023 05:04) (109/56 - 163/87)  BP(mean): 82 (15 Aug 2023 12:00) (76 - 108)  ABP: --  ABP(mean): --  RR: 18 (16 Aug 2023 05:04) (16 - 27)  SpO2: 100% (16 Aug 2023 05:04) (83% - 100%)    O2 Parameters below as of 16 Aug 2023 05:04  Patient On (Oxygen Delivery Method): nasal cannula  O2 Flow (L/min): 2            08-15 @ 07:01  -  08-16 @ 07:00  --------------------------------------------------------  IN: 620 mL / OUT: 4 mL / NET: 616 mL      CAPILLARY BLOOD GLUCOSE      POCT Blood Glucose.: 211 mg/dL (15 Aug 2023 22:07)      PHYSICAL EXAM:  General:   HEENT:   Lymph Nodes:  Neck:   Respiratory:   Cardiovascular:   Abdomen:   Extremities:   Skin:   Neurological:  Psychiatry:    HOSPITAL MEDICATIONS:  MEDICATIONS  (STANDING):  dextrose 5%. 1000 milliLiter(s) (100 mL/Hr) IV Continuous <Continuous>  dextrose 5%. 1000 milliLiter(s) (50 mL/Hr) IV Continuous <Continuous>  dextrose 50% Injectable 25 Gram(s) IV Push once  dextrose 50% Injectable 12.5 Gram(s) IV Push once  dextrose 50% Injectable 25 Gram(s) IV Push once  glucagon  Injectable 1 milliGRAM(s) IntraMuscular once  insulin glargine Injectable (LANTUS) 8 Unit(s) SubCutaneous at bedtime  insulin lispro (ADMELOG) corrective regimen sliding scale   SubCutaneous three times a day before meals  insulin lispro (ADMELOG) corrective regimen sliding scale   SubCutaneous at bedtime  ketoconazole 2% Cream 1 Application(s) Topical two times a day  polyethylene glycol 3350 17 Gram(s) Oral daily  senna 2 Tablet(s) Oral at bedtime  sodium chloride 1 Gram(s) Oral two times a day  tiotropium 2.5 MICROgram(s) Inhaler 2 Puff(s) Inhalation daily    MEDICATIONS  (PRN):  acetaminophen     Tablet .. 650 milliGRAM(s) Oral every 6 hours PRN Temp greater or equal to 38C (100.4F), Mild Pain (1 - 3)  albuterol/ipratropium for Nebulization 3 milliLiter(s) Nebulizer every 6 hours PRN Shortness of Breath and/or Wheezing  dextrose Oral Gel 15 Gram(s) Oral once PRN Blood Glucose LESS THAN 70 milliGRAM(s)/deciliter      LABS:                        9.8    16.77 )-----------( 562      ( 16 Aug 2023 04:15 )             31.2     Hgb Trend: 9.8<--, 10.5<--, 10.8<--, 11.1<--, 10.3<--  08-16    131<L>  |  97<L>  |  10  ----------------------------<  137<H>  3.9   |  24  |  0.60    Ca    9.6      16 Aug 2023 04:15  Phos  2.9     08-16  Mg     2.40     08-16      Creatinine Trend: 0.60<--, 0.53<--, 0.61<--, 0.54<--, 0.57<--, 0.54<--  PT/INR - ( 15 Aug 2023 03:30 )   PT: 11.9 sec;   INR: 1.05 ratio         PTT - ( 15 Aug 2023 03:30 )  PTT:28.7 sec  Urinalysis Basic - ( 16 Aug 2023 04:15 )    Color: x / Appearance: x / SG: x / pH: x  Gluc: 137 mg/dL / Ketone: x  / Bili: x / Urobili: x   Blood: x / Protein: x / Nitrite: x   Leuk Esterase: x / RBC: x / WBC x   Sq Epi: x / Non Sq Epi: x / Bacteria: x            MICROBIOLOGY:     Culture - Fungal, Bronchial (collected 15 Aug 2023 09:33)  Source: .Bronchial RIGHT LOWER LOBE  Preliminary Report (16 Aug 2023 06:36):    Testing in progress    Culture - Acid Fast - Bronchial w/Smear (collected 15 Aug 2023 09:33)  Source: .Bronchial RIGHT LOWER LOBE    Culture - Bronchial (collected 15 Aug 2023 09:33)  Source: .Bronchial RIGHT LOWER LOBE  Gram Stain (15 Aug 2023 15:16):    No polymorphonuclear cells seen per low power field    No squamous epithelial cells per low power field    No organisms seen per oil power field        RADIOLOGY:  [ ] Reviewed and interpreted by me    PULMONARY FUNCTION TESTS:    EKG: CHIEF COMPLAINT: SOB    Interval Events: She feels improved post procedure    REVIEW OF SYSTEMS:  Constitutional: [ ] negative [ ] fevers [ ] chills [ ] weight loss [ ] weight gain  HEENT: [ ] negative [ ] dry eyes [ ] eye irritation [ ] postnasal drip [ ] nasal congestion  CV: [ ] negative  [ ] chest pain [ ] orthopnea [ ] palpitations [ ] murmur  Resp: [ ] negative [ ] cough [ x] shortness of breath [ ] dyspnea [ ] wheezing [ ] sputum [ ] hemoptysis  GI: [ ] negative [ ] nausea [ ] vomiting [ ] diarrhea [ ] constipation [ ] abd pain [ ] dysphagia   : [ ] negative [ ] dysuria [ ] nocturia [ ] hematuria [ ] increased urinary frequency  Musculoskeletal: [ ] negative [ ] back pain [ ] myalgias [ ] arthralgias [ ] fracture  Skin: [ ] negative [ ] rash [ ] itch  Neurological: [ ] negative [ ] headache [ ] dizziness [ ] syncope [ ] weakness [ ] numbness  Psychiatric: [ ] negative [ ] anxiety [ ] depression  Endocrine: [ ] negative [ ] diabetes [ ] thyroid problem  Hematologic/Lymphatic: [ ] negative [ ] anemia [ ] bleeding problem  Allergic/Immunologic: [ ] negative [ ] itchy eyes [ ] nasal discharge [ ] hives [ ] angioedema  [x ] All other systems negative  [ ] Unable to assess ROS because ________    OBJECTIVE:  ICU Vital Signs Last 24 Hrs  T(C): 36.7 (16 Aug 2023 05:04), Max: 36.7 (15 Aug 2023 17:30)  T(F): 98.1 (16 Aug 2023 05:04), Max: 98.1 (15 Aug 2023 21:30)  HR: 91 (16 Aug 2023 05:04) (90 - 112)  BP: 123/63 (16 Aug 2023 05:04) (109/56 - 163/87)  BP(mean): 82 (15 Aug 2023 12:00) (76 - 108)  ABP: --  ABP(mean): --  RR: 18 (16 Aug 2023 05:04) (16 - 27)  SpO2: 100% (16 Aug 2023 05:04) (83% - 100%)    O2 Parameters below as of 16 Aug 2023 05:04  Patient On (Oxygen Delivery Method): nasal cannula  O2 Flow (L/min): 2            08-15 @ 07:01  -  08-16 @ 07:00  --------------------------------------------------------  IN: 620 mL / OUT: 4 mL / NET: 616 mL      CAPILLARY BLOOD GLUCOSE      POCT Blood Glucose.: 211 mg/dL (15 Aug 2023 22:07)      PHYSICAL EXAM:  General:   HEENT:   Lymph Nodes:  Neck:   Respiratory:   Cardiovascular:   Abdomen:   Extremities:   Skin:   Neurological:  Psychiatry:    HOSPITAL MEDICATIONS:  MEDICATIONS  (STANDING):  dextrose 5%. 1000 milliLiter(s) (100 mL/Hr) IV Continuous <Continuous>  dextrose 5%. 1000 milliLiter(s) (50 mL/Hr) IV Continuous <Continuous>  dextrose 50% Injectable 25 Gram(s) IV Push once  dextrose 50% Injectable 12.5 Gram(s) IV Push once  dextrose 50% Injectable 25 Gram(s) IV Push once  glucagon  Injectable 1 milliGRAM(s) IntraMuscular once  insulin glargine Injectable (LANTUS) 8 Unit(s) SubCutaneous at bedtime  insulin lispro (ADMELOG) corrective regimen sliding scale   SubCutaneous three times a day before meals  insulin lispro (ADMELOG) corrective regimen sliding scale   SubCutaneous at bedtime  ketoconazole 2% Cream 1 Application(s) Topical two times a day  polyethylene glycol 3350 17 Gram(s) Oral daily  senna 2 Tablet(s) Oral at bedtime  sodium chloride 1 Gram(s) Oral two times a day  tiotropium 2.5 MICROgram(s) Inhaler 2 Puff(s) Inhalation daily    MEDICATIONS  (PRN):  acetaminophen     Tablet .. 650 milliGRAM(s) Oral every 6 hours PRN Temp greater or equal to 38C (100.4F), Mild Pain (1 - 3)  albuterol/ipratropium for Nebulization 3 milliLiter(s) Nebulizer every 6 hours PRN Shortness of Breath and/or Wheezing  dextrose Oral Gel 15 Gram(s) Oral once PRN Blood Glucose LESS THAN 70 milliGRAM(s)/deciliter      LABS:                        9.8    16.77 )-----------( 562      ( 16 Aug 2023 04:15 )             31.2     Hgb Trend: 9.8<--, 10.5<--, 10.8<--, 11.1<--, 10.3<--  08-16    131<L>  |  97<L>  |  10  ----------------------------<  137<H>  3.9   |  24  |  0.60    Ca    9.6      16 Aug 2023 04:15  Phos  2.9     08-16  Mg     2.40     08-16      Creatinine Trend: 0.60<--, 0.53<--, 0.61<--, 0.54<--, 0.57<--, 0.54<--  PT/INR - ( 15 Aug 2023 03:30 )   PT: 11.9 sec;   INR: 1.05 ratio         PTT - ( 15 Aug 2023 03:30 )  PTT:28.7 sec  Urinalysis Basic - ( 16 Aug 2023 04:15 )    Color: x / Appearance: x / SG: x / pH: x  Gluc: 137 mg/dL / Ketone: x  / Bili: x / Urobili: x   Blood: x / Protein: x / Nitrite: x   Leuk Esterase: x / RBC: x / WBC x   Sq Epi: x / Non Sq Epi: x / Bacteria: x            MICROBIOLOGY:     Culture - Fungal, Bronchial (collected 15 Aug 2023 09:33)  Source: .Bronchial RIGHT LOWER LOBE  Preliminary Report (16 Aug 2023 06:36):    Testing in progress    Culture - Acid Fast - Bronchial w/Smear (collected 15 Aug 2023 09:33)  Source: .Bronchial RIGHT LOWER LOBE    Culture - Bronchial (collected 15 Aug 2023 09:33)  Source: .Bronchial RIGHT LOWER LOBE  Gram Stain (15 Aug 2023 15:16):    No polymorphonuclear cells seen per low power field    No squamous epithelial cells per low power field    No organisms seen per oil power field        RADIOLOGY:  [ ] Reviewed and interpreted by me    PULMONARY FUNCTION TESTS:    EKG: CHIEF COMPLAINT: SOB    Interval Events: She feels improved post procedure    REVIEW OF SYSTEMS:  Constitutional: [ ] negative [ ] fevers [ ] chills [ ] weight loss [ ] weight gain  HEENT: [ ] negative [ ] dry eyes [ ] eye irritation [ ] postnasal drip [ ] nasal congestion  CV: [ ] negative  [ ] chest pain [ ] orthopnea [ ] palpitations [ ] murmur  Resp: [ ] negative [ ] cough [ x] shortness of breath [ ] dyspnea [ ] wheezing [ ] sputum [ ] hemoptysis  GI: [ ] negative [ ] nausea [ ] vomiting [ ] diarrhea [ ] constipation [ ] abd pain [ ] dysphagia   : [ ] negative [ ] dysuria [ ] nocturia [ ] hematuria [ ] increased urinary frequency  Musculoskeletal: [ ] negative [ ] back pain [ ] myalgias [ ] arthralgias [ ] fracture  Skin: [ ] negative [ ] rash [ ] itch  Neurological: [ ] negative [ ] headache [ ] dizziness [ ] syncope [ ] weakness [ ] numbness  Psychiatric: [ ] negative [ ] anxiety [ ] depression  Endocrine: [ ] negative [ ] diabetes [ ] thyroid problem  Hematologic/Lymphatic: [ ] negative [ ] anemia [ ] bleeding problem  Allergic/Immunologic: [ ] negative [ ] itchy eyes [ ] nasal discharge [ ] hives [ ] angioedema  [x ] All other systems negative  [ ] Unable to assess ROS because ________    OBJECTIVE:  ICU Vital Signs Last 24 Hrs  T(C): 36.7 (16 Aug 2023 05:04), Max: 36.7 (15 Aug 2023 17:30)  T(F): 98.1 (16 Aug 2023 05:04), Max: 98.1 (15 Aug 2023 21:30)  HR: 91 (16 Aug 2023 05:04) (90 - 112)  BP: 123/63 (16 Aug 2023 05:04) (109/56 - 163/87)  BP(mean): 82 (15 Aug 2023 12:00) (76 - 108)  ABP: --  ABP(mean): --  RR: 18 (16 Aug 2023 05:04) (16 - 27)  SpO2: 100% (16 Aug 2023 05:04) (83% - 100%)    O2 Parameters below as of 16 Aug 2023 05:04  Patient On (Oxygen Delivery Method): nasal cannula  O2 Flow (L/min): 2            08-15 @ 07:01  -  08-16 @ 07:00  --------------------------------------------------------  IN: 620 mL / OUT: 4 mL / NET: 616 mL      CAPILLARY BLOOD GLUCOSE      POCT Blood Glucose.: 211 mg/dL (15 Aug 2023 22:07)      PHYSICAL EXAM:  General: Frail female laying in bed in no acute distress  HEENT: Nasal canula in place  Respiratory: Improved but still somewhat diminished breath sounds at the Right base  Cardiovascular: Regular rate and rhythm   Abdomen: Nontender nondistended  Extremities: No peripheral edema  Neurological: No appreciable neurologic deficits  Psychiatry: Appropriate mood and affect    HOSPITAL MEDICATIONS:  MEDICATIONS  (STANDING):  dextrose 5%. 1000 milliLiter(s) (100 mL/Hr) IV Continuous <Continuous>  dextrose 5%. 1000 milliLiter(s) (50 mL/Hr) IV Continuous <Continuous>  dextrose 50% Injectable 25 Gram(s) IV Push once  dextrose 50% Injectable 12.5 Gram(s) IV Push once  dextrose 50% Injectable 25 Gram(s) IV Push once  glucagon  Injectable 1 milliGRAM(s) IntraMuscular once  insulin glargine Injectable (LANTUS) 8 Unit(s) SubCutaneous at bedtime  insulin lispro (ADMELOG) corrective regimen sliding scale   SubCutaneous three times a day before meals  insulin lispro (ADMELOG) corrective regimen sliding scale   SubCutaneous at bedtime  ketoconazole 2% Cream 1 Application(s) Topical two times a day  polyethylene glycol 3350 17 Gram(s) Oral daily  senna 2 Tablet(s) Oral at bedtime  sodium chloride 1 Gram(s) Oral two times a day  tiotropium 2.5 MICROgram(s) Inhaler 2 Puff(s) Inhalation daily    MEDICATIONS  (PRN):  acetaminophen     Tablet .. 650 milliGRAM(s) Oral every 6 hours PRN Temp greater or equal to 38C (100.4F), Mild Pain (1 - 3)  albuterol/ipratropium for Nebulization 3 milliLiter(s) Nebulizer every 6 hours PRN Shortness of Breath and/or Wheezing  dextrose Oral Gel 15 Gram(s) Oral once PRN Blood Glucose LESS THAN 70 milliGRAM(s)/deciliter      LABS:                        9.8    16.77 )-----------( 562      ( 16 Aug 2023 04:15 )             31.2     Hgb Trend: 9.8<--, 10.5<--, 10.8<--, 11.1<--, 10.3<--  08-16    131<L>  |  97<L>  |  10  ----------------------------<  137<H>  3.9   |  24  |  0.60    Ca    9.6      16 Aug 2023 04:15  Phos  2.9     08-16  Mg     2.40     08-16      Creatinine Trend: 0.60<--, 0.53<--, 0.61<--, 0.54<--, 0.57<--, 0.54<--  PT/INR - ( 15 Aug 2023 03:30 )   PT: 11.9 sec;   INR: 1.05 ratio         PTT - ( 15 Aug 2023 03:30 )  PTT:28.7 sec  Urinalysis Basic - ( 16 Aug 2023 04:15 )    Color: x / Appearance: x / SG: x / pH: x  Gluc: 137 mg/dL / Ketone: x  / Bili: x / Urobili: x   Blood: x / Protein: x / Nitrite: x   Leuk Esterase: x / RBC: x / WBC x   Sq Epi: x / Non Sq Epi: x / Bacteria: x            MICROBIOLOGY:     Culture - Fungal, Bronchial (collected 15 Aug 2023 09:33)  Source: .Bronchial RIGHT LOWER LOBE  Preliminary Report (16 Aug 2023 06:36):    Testing in progress    Culture - Acid Fast - Bronchial w/Smear (collected 15 Aug 2023 09:33)  Source: .Bronchial RIGHT LOWER LOBE    Culture - Bronchial (collected 15 Aug 2023 09:33)  Source: .Bronchial RIGHT LOWER LOBE  Gram Stain (15 Aug 2023 15:16):    No polymorphonuclear cells seen per low power field    No squamous epithelial cells per low power field    No organisms seen per oil power field        RADIOLOGY:  [ ] Reviewed and interpreted by me    PULMONARY FUNCTION TESTS:    EKG:

## 2023-08-16 NOTE — DISCHARGE NOTE PROVIDER - NSDCMRMEDTOKEN_GEN_ALL_CORE_FT
Layoagljimmy HowellPen 100 units/mL subcutaneous solution: 10 unit(s) subcutaneous once a day AM  glimepiride 4 mg oral tablet: 1 orally once a day  Glucophage 500 mg oral tablet: 1 orally 2 times a day   Basaglar KwikPen 100 units/mL subcutaneous solution: 10 unit(s) subcutaneous once a day AM  glimepiride 4 mg oral tablet: 1 orally once a day  Glucophage 500 mg oral tablet: 1 orally 2 times a day  tiotropium 2.5 mcg/inh inhalation aerosol: 2 puff(s) inhaled once a day  Ventolin HFA 90 mcg/inh inhalation aerosol: 2 puff(s) inhaled every 6 hours as needed for  shortness of breath and/or wheezing   Basaglar KwikPen 100 units/mL subcutaneous solution: 10 unit(s) subcutaneous once a day AM  glimepiride 4 mg oral tablet: 1 orally once a day  Glucophage 500 mg oral tablet: 1 orally 2 times a day  Incruse Ellipta 62.5 mcg/inh inhalation powder: 1 puff(s) inhaled once a day  Ventolin HFA 90 mcg/inh inhalation aerosol: 2 puff(s) inhaled every 6 hours as needed for  shortness of breath and/or wheezing

## 2023-08-16 NOTE — PROGRESS NOTE ADULT - ASSESSMENT
This is a 68 year old female with stage IV large cell neuroendocrine cancer who presents with dyspnea and cough.    1. Stage IV large cell neuroendocrine cancer   -- Involving lungs, bones, and lymph nodes   -- Currently on treatment with pemetrexed and pembrolizumab (LD 07/21)   -- No systemic treatment while admitted  -- Follow up with Dr. Rodriguez at [facility] [place] after discharge     2. Shortness of breath   -- Secondary to disease burden   -- CTA chest w/o PE; extensive pulm mets and mediastinal/hilar lymphadenopathy; T10 lytic lesion; extrinsic compression of RLL bronchus   -- Appreciate care per interventional pulm- s/p bronchoscopy 08/15 with tumor debulking and balloon dilation.   -- PET/CT from 07/17 with right hilar mass, numerous pulmonary mets, thoracic and mediastinal lymphadenopathy, pleural mets, and osseous mets (L5, left hemisacrum, left anterior 6th rib, right prox humeral shaft, left posterior arch of C1)   -- Continue supplemental O2 as needed, wean as tolerated . pt became hypoxic on room air, will likely need home O2     3. Osseous mets   -- Neurosurgery consulted for T10 lytic lesion  -- Bone scan with findings suspicious for osseous mets involving mid thoracic spine, L5, midshaft of R femur; metastatic disease vs post-traumatic changes in L mid and R lower ribs   -- MRI T/L spine with multiple thoracic vertebral mets within T10-T11, and T7 and T10 transverse processes; indeterminate L5 vertebral lesion; no evidence of cord compromise   -- Ortho consulted, R femur lesion not concerning for mets  -- Continue pain control       Will continue to follow.    Adelaide Sampson PA-C  Hematology/Oncology  [facility]  [phone] (office)  [phone] (alt office)  Evenings and weekends please call MD on call or office

## 2023-08-16 NOTE — PROGRESS NOTE ADULT - ASSESSMENT
67YO Female with stage IV large cell neuroendocrine cancer on chemotherapy, presents to the ER with dyspnea and cough, found to have extrinsic compression of RLL and pulm consulted for further recommendations.    #Stage IV large cell neuroendocrine cancer   - currently on treatment with pemetrexed and pembrolizumab q 3 weeks (last on 07/21)  - CT scan shows extensive metastatic disease as well as RLL compression from extrinsic compression of hilar mass (and possibly endobronchial lesion?) with RLL atelectasis  - per heme note, PET/CT from 07/17 showing right hilar mass, numerous pulmonary mets, thoracic and mediastinal lymphadenopathy, pleural mets, and osseous mets (L5, left hemisacrum, left anterior 6th rib, right prox humeral shaft, left posterior arch of C1  - unclear if RLL collapse is new?  - chest pain over right hemithorax is likely secondary to pleural irritation    Recommendations:  - patient with SOB in the setting of pulm mets and RLL collapse  - given immunocompromised state and elevate wbc count with RLL collapse; concern for post obstructive PNA and c/w Ceftriaxone  - would be helpful to view images from [facility] [place] --> please obtain if possible  - patient with extensive emphysema and likely has COPD (never been formally diagnosed); no wheezing on exam  - C/w Spiriva   - C/w Duonebs PRN wheezing or SOB  - S/p Flexible bronchoscopy with tumor debulking and balloon dilation  - F/u BAL cultures     67YO Female with stage IV large cell neuroendocrine cancer on chemotherapy, presents to the ER with dyspnea and cough, found to have extrinsic compression of RLL and pulm consulted for further recommendations.    #Stage IV large cell neuroendocrine cancer   - currently on treatment with pemetrexed and pembrolizumab q 3 weeks (last on )  - CT scan shows extensive metastatic disease as well as RLL compression from extrinsic compression of hilar mass (and possibly endobronchial lesion?) with RLL atelectasis  - per heme note, PET/CT from  showing right hilar mass, numerous pulmonary mets, thoracic and mediastinal lymphadenopathy, pleural mets, and osseous mets (L5, left hemisacrum, left anterior 6th rib, right prox humeral shaft, left posterior arch of C1  - unclear if RLL collapse is new?  - chest pain over right hemithorax is likely secondary to pleural irritation    Recommendations:  - patient with SOB in the setting of pulm mets and RLL collapse  - given immunocompromised state and elevate wbc count with RLL collapse; concern for post obstructive PNA and S/p Ceftriaxone -  - patient with extensive emphysema and likely has COPD (never been formally diagnosed); no wheezing on exam  - C/w Spiriva   - C/w Duonebs PRN wheezing or SOB  - S/p Flexible bronchoscopy with tumor debulking and balloon dilation  - F/u BAL cultures  - Please set up home O2 prior to discharge  - On the day prior to discharge please email: [email or web address] to setup an appointment prior to discharge with any available pulmonologist. The appointment should be within 1-2 weeks of discharge from the hospital. Include the patient's name, , MRN and contact information in the email.      [facility]  [address] [place] [address]  [phone]    Please discuss the appointment details with the patient and include the appointment details in the patients discharge summary.    Case discussed with Dr. Pat

## 2023-08-16 NOTE — PROVIDER CONTACT NOTE (OTHER) - ASSESSMENT
Placed Pt on 1 L. satting 95%
T 98.4. P 115. /65. R 18. O2 96% 2L NC. patient asymptomatic. denies chest pain. denies SOB.
Patient HR is 102 during Q4 vital checks - Patient stable and asymptomatic   Patient diastolic BP is 58 during Q4 vital checks - Patient stable and asymptomatic
a/ox4, Pt is having similar R side abdominal pain and back pain - received oxycodone and now wants tylenol prior to going to [facility]. Sinus tachy between 101-110 on tele
patient resting comfortably in bed in no acute distress. /70, on 2 liters of oxygen for comfort. Does not desaturate off oxygen as per primary RN Quirino.

## 2023-08-16 NOTE — PROVIDER CONTACT NOTE (OTHER) - SITUATION
Heart rate 111
Patient HR elevated and DBP low
Pt desatted to 86 after being weaned off 2L.
HR sustaining 120s
Pt is having similar R side abdominal pain and back pain - received oxycodone and now wants tylenol prior to going to [facility]

## 2023-08-16 NOTE — PROGRESS NOTE ADULT - SUBJECTIVE AND OBJECTIVE BOX
[facility]   MD BASIA BOWLES MD KRISTINE SOLTANPOUR, DO ANGELA WONG, PA    TEL:  OFFICE: [phone]  From 5pm-7am Answering Service [phone]    -- RENAL FOLLOW UP NOTE ---Date of Service 08-16-23 @ 11:28    Patient is a 68y old  Female who presents with a chief complaint of shortness of breath (16 Aug 2023 07:34)      Patient seen and examined at bedside. No chest pain/sob    VITALS:  T(F): 98.1 (08-16-23 @ 05:04), Max: 98.1 (08-15-23 @ 21:30)  HR: 91 (08-16-23 @ 05:04)  BP: 123/63 (08-16-23 @ 05:04)  RR: 18 (08-16-23 @ 05:04)  SpO2: 100% (08-16-23 @ 05:04)  Wt(kg): --    08-15 @ 07:01  -  08-16 @ 07:00  --------------------------------------------------------  IN: 620 mL / OUT: 4 mL / NET: 616 mL          PHYSICAL EXAM:  General: NAD  Neck: No JVD  Respiratory: CTAB, no wheezes, rales or rhonchi  Cardiovascular: S1, S2, RRR  Gastrointestinal: BS+, soft, NT/ND  Extremities: No peripheral edema    Hospital Medications:   MEDICATIONS  (STANDING):  dextrose 5%. 1000 milliLiter(s) (100 mL/Hr) IV Continuous <Continuous>  dextrose 5%. 1000 milliLiter(s) (50 mL/Hr) IV Continuous <Continuous>  dextrose 50% Injectable 25 Gram(s) IV Push once  dextrose 50% Injectable 12.5 Gram(s) IV Push once  dextrose 50% Injectable 25 Gram(s) IV Push once  glucagon  Injectable 1 milliGRAM(s) IntraMuscular once  insulin glargine Injectable (LANTUS) 8 Unit(s) SubCutaneous at bedtime  insulin lispro (ADMELOG) corrective regimen sliding scale   SubCutaneous three times a day before meals  insulin lispro (ADMELOG) corrective regimen sliding scale   SubCutaneous at bedtime  ketoconazole 2% Cream 1 Application(s) Topical two times a day  polyethylene glycol 3350 17 Gram(s) Oral daily  senna 2 Tablet(s) Oral at bedtime  sodium chloride 1 Gram(s) Oral two times a day  tiotropium 2.5 MICROgram(s) Inhaler 2 Puff(s) Inhalation daily      LABS:  08-16    131<L>  |  97<L>  |  10  ----------------------------<  137<H>  3.9   |  24  |  0.60    Ca    9.6      16 Aug 2023 04:15  Phos  2.9     08-16  Mg     2.40     08-16      Creatinine Trend: 0.60 <--, 0.53 <--, 0.61 <--, 0.54 <--, 0.57 <--, 0.54 <--    Phosphorus: 2.9 mg/dL (08-16 @ 04:15)                              9.8    16.77 )-----------( 562      ( 16 Aug 2023 04:15 )             31.2     Urine Studies:  Urinalysis - [08-16-23 @ 04:15]      Color  / Appearance  / SG  / pH       Gluc 137 / Ketone   / Bili  / Urobili        Blood  / Protein  / Leuk Est  / Nitrite       RBC  / WBC  / Hyaline  / Gran  / Sq Epi  / Non Sq Epi  / Bacteria     Urine Sodium 50      [08-13-23 @ 06:36]    TSH 1.48      [08-07-23 @ 10:58]    HCV 0.13, Nonreact      [08-08-23 @ 05:15]      RADIOLOGY & ADDITIONAL STUDIES:

## 2023-08-16 NOTE — PROGRESS NOTE ADULT - ASSESSMENT
67 yo female with PMH lung ca, DM 2 recently diagnosed admitted with evaluation of shortness of breath and cough clinical presentation consistent with worsening disease burden, no pneumonia or PE on CTA       Problem/Plan - 1:  ·  Problem: Cancer of lung.   ·  Plan:   likely secondary to extensive disease burden and extrinsic tumor compression of right bronchus  continue O2 as needed  oncology consultation appreciated   patient has stage 4 large cell neuroendocrine tumor.  Dermatology consult noted.      Problem/Plan - 2:  ·  Problem: Metastatic Lesion of thoracic vertebra.   ·  Plan:   CT Lumbar and cervical spine noted  bone scan positive for lesion L5 mid thoracic and right femur shaft   ortho oncology consultation devaughn by me  X ray right femur  spine ortho follow up     < from: MR Lumbar Spine w/wo IV Cont (08.11.23 @ 10:43) >  IMPRESSION:    1. THORACIC SPINE:   Multiple thoracic vertebral metastases include   within the T10 and T11 vertebral bodies and the T7 and T10 left   transverse processes. No thoracic canal compromise. Thoracic cord contact    2. LUMBAR SPINE:   L5 vertebral lesion is indeterminate, vertebral   metastasis versus hemangioma bone with atypical imaging properties.  S2   metastasis. No lumbar canal compromise. Conus and cauda equina are intact    3. Pulmonarymetastases. Mediastinal lymph node metastases    Management per oncology .      Problem/Plan - 3:  ·  Problem: Post Obstruction Pneumonia with acute respiratory failure with hypoxia with COPD  .   ·  Plan: likely secondary to malignancy .   S/P Bronchoscopy and stent  Abxs per Pulmonary .  < from: CT Angio Chest PE Protocol w/ IV Cont (08.07.23 @ 05:01) >  IMPRESSION:  No pulmonary embolism.    Extensive bilateral pulmonary metastases and confluent mediastinal/hilar   adenopathy. Likely lymphangitic spread of tumor. Lytic lesion in the T10   vertebral body suspicious for bony metastasis. Nonspecific left adrenal   gland thickening. Extrinsic compression on right lower lobe bronchus with   right lower lobe subsegmental atelectasis.  Findings are concerning for   bronchogenic malignancy.  Consider small cell carcinoma.    < end of copied text >     Problem/Plan - 4:  ·  Problem: DM (diabetes mellitus).   ·  Plan: finger sticks with short acting insulin sliding scale  will continue Lantus  8 units.     Problem/Plan - 5:  ·  Problem: Acute respiratory failure with hypoxia .   ·  Plan: Oxygen NC and needs home oxygen

## 2023-08-16 NOTE — PROGRESS NOTE ADULT - ASSESSMENT
69 yo female with PMH lung Ca on CT (last CT 3 weeks ago), DM for evaluation of shortness of breath and cough x 1 week, acutely worsened yesterday after walking a lot visiting the statue of [employer]. Nephrology consulted for hyponatremia.     Hyponatremia  stable  Pt denied hx of known hyponatremia however Na has been low since admission  Likely SIADH.  Free water restriction <1L/day - pt educated  continue Na tab  Monitor Na  Pending urine osmo; Urine Na 50.  Avoid overcorrection, na should not correct >8meq in 24 hrs

## 2023-08-17 PROBLEM — C34.90 MALIGNANT NEOPLASM OF UNSPECIFIED PART OF UNSPECIFIED BRONCHUS OR LUNG: Chronic | Status: ACTIVE | Noted: 2023-01-01

## 2023-08-17 PROBLEM — E11.9 TYPE 2 DIABETES MELLITUS WITHOUT COMPLICATIONS: Chronic | Status: ACTIVE | Noted: 2023-01-01

## 2023-08-17 NOTE — PROGRESS NOTE ADULT - SUBJECTIVE AND OBJECTIVE BOX
Patient is a 68y old  Female who presents with a chief complaint of shortness of breath (17 Aug 2023 11:34)    Patient seen this morning. She feels well and has no complaints. Discharge delayed due to new home O2 requirement.     MEDICATIONS  (STANDING):  dextrose 5%. 1000 milliLiter(s) (100 mL/Hr) IV Continuous <Continuous>  dextrose 5%. 1000 milliLiter(s) (50 mL/Hr) IV Continuous <Continuous>  dextrose 50% Injectable 25 Gram(s) IV Push once  dextrose 50% Injectable 12.5 Gram(s) IV Push once  dextrose 50% Injectable 25 Gram(s) IV Push once  glucagon  Injectable 1 milliGRAM(s) IntraMuscular once  heparin   Injectable 5000 Unit(s) SubCutaneous every 12 hours  insulin glargine Injectable (LANTUS) 8 Unit(s) SubCutaneous at bedtime  insulin lispro (ADMELOG) corrective regimen sliding scale   SubCutaneous three times a day before meals  insulin lispro (ADMELOG) corrective regimen sliding scale   SubCutaneous at bedtime  ketoconazole 2% Cream 1 Application(s) Topical two times a day  melatonin 6 milliGRAM(s) Oral at bedtime  polyethylene glycol 3350 17 Gram(s) Oral daily  senna 2 Tablet(s) Oral at bedtime  sodium chloride 1 Gram(s) Oral two times a day  tiotropium 2.5 MICROgram(s) Inhaler 2 Puff(s) Inhalation daily    MEDICATIONS  (PRN):  acetaminophen     Tablet .. 650 milliGRAM(s) Oral every 6 hours PRN Temp greater or equal to 38C (100.4F), Mild Pain (1 - 3)  albuterol/ipratropium for Nebulization 3 milliLiter(s) Nebulizer every 6 hours PRN Shortness of Breath and/or Wheezing  dextrose Oral Gel 15 Gram(s) Oral once PRN Blood Glucose LESS THAN 70 milliGRAM(s)/deciliter        Vital Signs Last 24 Hrs  T(C): 36.7 (17 Aug 2023 05:40), Max: 37.2 (16 Aug 2023 21:30)  T(F): 98.1 (17 Aug 2023 05:40), Max: 98.9 (16 Aug 2023 21:30)  HR: 96 (17 Aug 2023 05:40) (65 - 98)  BP: 132/65 (17 Aug 2023 05:40) (113/65 - 142/70)  BP(mean): --  RR: 18 (17 Aug 2023 05:40) (17 - 18)  SpO2: 97% (17 Aug 2023 05:40) (86% - 98%)    Parameters below as of 17 Aug 2023 05:40  Patient On (Oxygen Delivery Method): nasal cannula  O2 Flow (L/min): 1      PE  NAD  Awake, alert  Anicteric, MMM  +nasal cannula   No c/c/e  No rash grossly  FROM                          10.3   14.60 )-----------( 577      ( 17 Aug 2023 07:00 )             32.9       08-17    135  |  97<L>  |  8   ----------------------------<  143<H>  3.9   |  25  |  0.50    Ca    9.8      17 Aug 2023 07:00  Phos  2.6     08-17  Mg     2.00     08-17

## 2023-08-17 NOTE — PROGRESS NOTE ADULT - NS ATTEND OPT1 GEN_ALL_CORE

## 2023-08-17 NOTE — DISCHARGE NOTE NURSING/CASE MANAGEMENT/SOCIAL WORK - NSDCPEFALRISK_GEN_ALL_CORE
For information on Fall & Injury Prevention, visit: [email or web address] OR  [email or web address] OR  https://www.cdc.gov/steadi/patient.html

## 2023-08-17 NOTE — PROGRESS NOTE ADULT - SUBJECTIVE AND OBJECTIVE BOX
[facility]   MD BASIA BOWLES MD KRISTINE SOLTANPOUR, DO ANGELA WONG, PA    TEL:  OFFICE: [phone]  From 5pm-7am Answering Service [phone]    -- RENAL FOLLOW UP NOTE ---Date of Service 08-17-23 @ 11:35    Patient is a 68y old  Female who presents with a chief complaint of shortness of breath (16 Aug 2023 14:53)      Patient seen and examined at bedside. No chest pain/sob    VITALS:  T(F): 98.1 (08-17-23 @ 05:40), Max: 98.9 (08-16-23 @ 21:30)  HR: 96 (08-17-23 @ 05:40)  BP: 132/65 (08-17-23 @ 05:40)  RR: 18 (08-17-23 @ 05:40)  SpO2: 97% (08-17-23 @ 05:40)  Wt(kg): --        PHYSICAL EXAM:  General: NAD  Neck: No JVD  Respiratory: CTAB, no wheezes, rales or rhonchi  Cardiovascular: S1, S2, RRR  Gastrointestinal: BS+, soft, NT/ND  Extremities: No peripheral edema    Hospital Medications:   MEDICATIONS  (STANDING):  dextrose 5%. 1000 milliLiter(s) (100 mL/Hr) IV Continuous <Continuous>  dextrose 5%. 1000 milliLiter(s) (50 mL/Hr) IV Continuous <Continuous>  dextrose 50% Injectable 25 Gram(s) IV Push once  dextrose 50% Injectable 12.5 Gram(s) IV Push once  dextrose 50% Injectable 25 Gram(s) IV Push once  glucagon  Injectable 1 milliGRAM(s) IntraMuscular once  heparin   Injectable 5000 Unit(s) SubCutaneous every 12 hours  insulin glargine Injectable (LANTUS) 8 Unit(s) SubCutaneous at bedtime  insulin lispro (ADMELOG) corrective regimen sliding scale   SubCutaneous three times a day before meals  insulin lispro (ADMELOG) corrective regimen sliding scale   SubCutaneous at bedtime  ketoconazole 2% Cream 1 Application(s) Topical two times a day  melatonin 6 milliGRAM(s) Oral at bedtime  polyethylene glycol 3350 17 Gram(s) Oral daily  senna 2 Tablet(s) Oral at bedtime  sodium chloride 1 Gram(s) Oral two times a day  tiotropium 2.5 MICROgram(s) Inhaler 2 Puff(s) Inhalation daily      LABS:  08-17    135  |  97<L>  |  8   ----------------------------<  143<H>  3.9   |  25  |  0.50    Ca    9.8      17 Aug 2023 07:00  Phos  2.6     08-17  Mg     2.00     08-17      Creatinine Trend: 0.50 <--, 0.60 <--, 0.53 <--, 0.61 <--, 0.54 <--, 0.57 <--, 0.54 <--    Phosphorus: 2.6 mg/dL (08-17 @ 07:00)                              10.3   14.60 )-----------( 577      ( 17 Aug 2023 07:00 )             32.9     Urine Studies:  Urinalysis - [08-17-23 @ 07:00]      Color  / Appearance  / SG  / pH       Gluc 143 / Ketone   / Bili  / Urobili        Blood  / Protein  / Leuk Est  / Nitrite       RBC  / WBC  / Hyaline  / Gran  / Sq Epi  / Non Sq Epi  / Bacteria     Urine Sodium 50      [08-13-23 @ 06:36]    TSH 1.48      [08-07-23 @ 10:58]    HCV 0.13, Nonreact      [08-08-23 @ 05:15]      RADIOLOGY & ADDITIONAL STUDIES:

## 2023-08-17 NOTE — PROGRESS NOTE ADULT - PROVIDER SPECIALTY LIST ADULT
Heme/Onc
Internal Medicine
Internal Medicine
Nephrology
Pulmonology
Heme/Onc
Internal Medicine
Heme/Onc
Heme/Onc
Internal Medicine
Nephrology
Nephrology
Heme/Onc
Internal Medicine
Intervent Pulmonology
Nephrology
Pulmonology
Pulmonology
Internal Medicine

## 2023-08-17 NOTE — PROGRESS NOTE ADULT - NS ATTEND AMEND GEN_ALL_CORE FT
As Above
As above
Awaiting MRI of her T/L spine.  Pulmonary evaluation noted-  plan for bronchoscopy with evaluation and possible debulking vs. evaluation for endobronchial stent. Agree with empiric blood cultures and antibiotics.
Patient is pending bronchoscopy due to extrinsic compression of RLL bronchus ,  Otherwise, would continue with current plan
pt with stage IV large cell neuroendocrine cancer on pemetrexed and pembrolizumab maintenance  Now with SOB related to malignancy, on oxygen  Imaging with POD and Notably with extrinsic compression of RLL bronchus / atelectasis  Recommend pulmonary consultation to address the above and consideration for bronchoscopy as warranted.  Rest per the above
Agree with above
Agree with above
As above. Na improving.
as above
As Above
Started on rocephin, would consider broadening coverage.  Pulmonary evaluation noted, possible bronch next week with possible debulking/stent. Images to be reviewed.
s/p bronchoscopy today, results pending  patient seen post procedure , feeling well  on Oxygen per NC  Will continue to follow
Briefly, 67 y/o female with metastatic large cell neuroendocrine tumor of the lung, on active chemoimmunotherapy at [facility], admitted with cough and dyspnea. Likely due to tumor burden. Patient is on antibiotics for post-obstructive pneumonia. Plan for bronchoscopy tomorrow. Wean oxygen as tolerated. Optimize pain control.
as above

## 2023-08-17 NOTE — DISCHARGE NOTE NURSING/CASE MANAGEMENT/SOCIAL WORK - PATIENT PORTAL LINK FT
You can access the FollowMyHealth Patient Portal offered by [facility] by registering at the following website: [email or web address]. By joining [employer]’s FollowMyHealth portal, you will also be able to view your health information using other applications (apps) compatible with our system.

## 2023-08-17 NOTE — PROGRESS NOTE ADULT - ASSESSMENT
69 yo female with PMH lung ca, DM 2 recently diagnosed admitted with evaluation of shortness of breath and cough clinical presentation consistent with worsening disease burden, no pneumonia or PE on CTA       Problem/Plan - 1:  ·  Problem: Cancer of lung.   ·  Plan:   likely secondary to extensive disease burden and extrinsic tumor compression of right bronchus  continue O2 as needed  oncology consultation appreciated   patient has stage 4 large cell neuroendocrine tumor.  Dermatology consult noted.      Problem/Plan - 2:  ·  Problem: Metastatic Lesion of thoracic vertebra.   ·  Plan:   CT Lumbar and cervical spine noted  bone scan positive for lesion L5 mid thoracic and right femur shaft   ortho oncology consultation devaughn by me  X ray right femur  spine ortho follow up     < from: MR Lumbar Spine w/wo IV Cont (08.11.23 @ 10:43) >  IMPRESSION:    1. THORACIC SPINE:   Multiple thoracic vertebral metastases include   within the T10 and T11 vertebral bodies and the T7 and T10 left   transverse processes. No thoracic canal compromise. Thoracic cord contact    2. LUMBAR SPINE:   L5 vertebral lesion is indeterminate, vertebral   metastasis versus hemangioma bone with atypical imaging properties.  S2   metastasis. No lumbar canal compromise. Conus and cauda equina are intact    3. Pulmonarymetastases. Mediastinal lymph node metastases    Management per oncology .      Problem/Plan - 3:  ·  Problem: Post Obstruction Pneumonia with acute respiratory failure with hypoxia with COPD  .   ·  Plan: likely secondary to malignancy .   S/P Bronchoscopy and stent  Abxs per Pulmonary .  < from: CT Angio Chest PE Protocol w/ IV Cont (08.07.23 @ 05:01) >  IMPRESSION:  No pulmonary embolism.    Extensive bilateral pulmonary metastases and confluent mediastinal/hilar   adenopathy. Likely lymphangitic spread of tumor. Lytic lesion in the T10   vertebral body suspicious for bony metastasis. Nonspecific left adrenal   gland thickening. Extrinsic compression on right lower lobe bronchus with   right lower lobe subsegmental atelectasis.  Findings are concerning for   bronchogenic malignancy.  Consider small cell carcinoma.    < end of copied text >     Problem/Plan - 4:  ·  Problem: DM (diabetes mellitus).   ·  Plan: finger sticks with short acting insulin sliding scale  will continue Lantus  8 units.     Problem/Plan - 5:  ·  Problem: Acute respiratory failure with hypoxia .   ·  Plan: Oxygen NC and needs home oxygen     Dispo : DC planning home today to F/U outpt.

## 2023-08-17 NOTE — PROGRESS NOTE ADULT - REASON FOR ADMISSION

## 2023-08-17 NOTE — PROGRESS NOTE ADULT - SUBJECTIVE AND OBJECTIVE BOX
Date of Service  : 08-17-23     INTERVAL HPI/OVERNIGHT EVENTS: I feel fine. I am going home as have Oxygen now.   Vital Signs Last 24 Hrs  T(C): 36.6 (17 Aug 2023 13:40), Max: 37.2 (16 Aug 2023 21:30)  T(F): 97.8 (17 Aug 2023 13:40), Max: 98.9 (16 Aug 2023 21:30)  HR: 95 (17 Aug 2023 13:40) (95 - 98)  BP: 125/67 (17 Aug 2023 13:40) (125/67 - 142/70)  BP(mean): --  RR: 18 (17 Aug 2023 13:40) (17 - 18)  SpO2: 97% (17 Aug 2023 13:40) (96% - 98%)    Parameters below as of 17 Aug 2023 13:40  Patient On (Oxygen Delivery Method): nasal cannula  O2 Flow (L/min): 1    I&O's Summary    MEDICATIONS  (STANDING):  dextrose 5%. 1000 milliLiter(s) (100 mL/Hr) IV Continuous <Continuous>  dextrose 5%. 1000 milliLiter(s) (50 mL/Hr) IV Continuous <Continuous>  dextrose 50% Injectable 25 Gram(s) IV Push once  dextrose 50% Injectable 12.5 Gram(s) IV Push once  dextrose 50% Injectable 25 Gram(s) IV Push once  glucagon  Injectable 1 milliGRAM(s) IntraMuscular once  heparin   Injectable 5000 Unit(s) SubCutaneous every 12 hours  insulin glargine Injectable (LANTUS) 8 Unit(s) SubCutaneous at bedtime  insulin lispro (ADMELOG) corrective regimen sliding scale   SubCutaneous three times a day before meals  insulin lispro (ADMELOG) corrective regimen sliding scale   SubCutaneous at bedtime  ketoconazole 2% Cream 1 Application(s) Topical two times a day  melatonin 6 milliGRAM(s) Oral at bedtime  polyethylene glycol 3350 17 Gram(s) Oral daily  senna 2 Tablet(s) Oral at bedtime  sodium chloride 1 Gram(s) Oral two times a day  tiotropium 2.5 MICROgram(s) Inhaler 2 Puff(s) Inhalation daily    MEDICATIONS  (PRN):  acetaminophen     Tablet .. 650 milliGRAM(s) Oral every 6 hours PRN Temp greater or equal to 38C (100.4F), Mild Pain (1 - 3)  albuterol/ipratropium for Nebulization 3 milliLiter(s) Nebulizer every 6 hours PRN Shortness of Breath and/or Wheezing  dextrose Oral Gel 15 Gram(s) Oral once PRN Blood Glucose LESS THAN 70 milliGRAM(s)/deciliter    LABS:                        10.3   14.60 )-----------( 577      ( 17 Aug 2023 07:00 )             32.9     08-17    135  |  97<L>  |  8   ----------------------------<  143<H>  3.9   |  25  |  0.50    Ca    9.8      17 Aug 2023 07:00  Phos  2.6     08-17  Mg     2.00     08-17        Urinalysis Basic - ( 17 Aug 2023 07:00 )    Color: x / Appearance: x / SG: x / pH: x  Gluc: 143 mg/dL / Ketone: x  / Bili: x / Urobili: x   Blood: x / Protein: x / Nitrite: x   Leuk Esterase: x / RBC: x / WBC x   Sq Epi: x / Non Sq Epi: x / Bacteria: x      CAPILLARY BLOOD GLUCOSE      POCT Blood Glucose.: 169 mg/dL (17 Aug 2023 13:12)  POCT Blood Glucose.: 122 mg/dL (17 Aug 2023 09:18)  POCT Blood Glucose.: 175 mg/dL (16 Aug 2023 21:34)  POCT Blood Glucose.: 143 mg/dL (16 Aug 2023 17:32)        Urinalysis Basic - ( 17 Aug 2023 07:00 )    Color: x / Appearance: x / SG: x / pH: x  Gluc: 143 mg/dL / Ketone: x  / Bili: x / Urobili: x   Blood: x / Protein: x / Nitrite: x   Leuk Esterase: x / RBC: x / WBC x   Sq Epi: x / Non Sq Epi: x / Bacteria: x      REVIEW OF SYSTEMS:  CONSTITUTIONAL: No fever, weight loss, or fatigue  EYES: No eye pain, visual disturbances, or discharge  ENMT:  No difficulty hearing, tinnitus, vertigo; No sinus or throat pain  NECK: No pain or stiffness  RESPIRATORY: No cough, wheezing, chills or hemoptysis; No shortness of breath  CARDIOVASCULAR: No chest pain, palpitations, dizziness, or leg swelling  GASTROINTESTINAL: No abdominal or epigastric pain. No nausea, vomiting, or hematemesis; No diarrhea or constipation. No melena or hematochezia.  GENITOURINARY: No dysuria, frequency, hematuria, or incontinence  NEUROLOGICAL: No headaches, memory loss, loss of strength, numbness, or tremors    Consultant(s) Notes Reviewed:  [x ] YES  [ ] NO    PHYSICAL EXAM:  GENERAL: NAD, ,not in any distress ,NC Oxygen   HEAD:  Atraumatic, Normocephalic  EYES: EOMI, PERRLA, conjunctiva and sclera clear  ENMT: No tonsillar erythema, exudates, or enlargement; Moist mucous membranes, Good dentition, No lesions  NECK: Supple, No JVD, Normal thyroid  NERVOUS SYSTEM:  Alert & Oriented X3, No focal deficit   CHEST/LUNG: Good air entry bilateral except right base   HEART: Regular rate and rhythm; No murmurs, rubs, or gallops  ABDOMEN: Soft, Nontender, Nondistended; Bowel sounds present  EXTREMITIES:  2+ Peripheral Pulses, No clubbing, cyanosis, or edema    Care Discussed with Consultants/Other Providers [ x] YES  [ ] NO

## 2023-08-17 NOTE — PROGRESS NOTE ADULT - ASSESSMENT
69 yo female with PMH lung Ca on CT (last CT 3 weeks ago), DM for evaluation of shortness of breath and cough x 1 week, acutely worsened yesterday after walking a lot visiting the statue of [place]. Nephrology consulted for hyponatremia.     Hyponatremia  better  Pt denied hx of known hyponatremia however Na has been low since admission  Likely SIADH.  Free water restriction <1L/day - pt educated  continue Na tab  Monitor Na  Pending urine osmo; Urine Na 50.  Avoid overcorrection, na should not correct >8meq in 24 hrs

## 2023-08-17 NOTE — PROGRESS NOTE ADULT - ASSESSMENT
This is a 68 year old female with stage IV large cell neuroendocrine cancer who presents with dyspnea and cough.    1. Stage IV large cell neuroendocrine cancer   -- Involving lungs, bones, and lymph nodes   -- Currently on treatment with pemetrexed and pembrolizumab (LD 07/21)   -- No systemic treatment while admitted  -- Follow up with Dr. Rodriguez at [facility] after [place]     2. Shortness of breath   -- Secondary to disease burden   -- CTA chest w/o PE; extensive pulm mets and mediastinal/hilar lymphadenopathy; T10 lytic lesion; extrinsic compression of RLL bronchus   -- Appreciate care per interventional pulm- s/p bronchoscopy 08/15 with tumor debulking and balloon dilation.   -- PET/CT from 07/17 with right hilar mass, numerous pulmonary mets, thoracic and mediastinal lymphadenopathy, pleural mets, and osseous mets (L5, left hemisacrum, left anterior 6th rib, right prox humeral shaft, left posterior arch of C1)   -- Currently on 1L nasal cannula, awaiting home O2 arrangements     3. Osseous mets   -- Neurosurgery consulted for T10 lytic lesion  -- Bone scan with findings suspicious for osseous mets involving mid thoracic spine, L5, midshaft of R femur; metastatic disease vs post-traumatic changes in L mid and R lower ribs   -- MRI T/L spine with multiple thoracic vertebral mets within T10-T11, and T7 and T10 transverse processes; indeterminate L5 vertebral lesion; no evidence of cord compromise   -- Ortho consulted, R femur lesion not concerning for mets  -- Continue pain control- improved     Discharge planning in progress pending delivery of home O2 equipment. Will continue to follow while she remains admitted.     Adelaide Sampson PA-C  Hematology/Oncology  [facility]  [phone] (office)  [phone] (alt office)  Evenings and weekends please call MD on call or office

## 2023-08-17 NOTE — DISCHARGE NOTE NURSING/CASE MANAGEMENT/SOCIAL WORK - NSDCFUADDAPPT_GEN_ALL_CORE_FT
Follow up with PCP within 1-2 weeks of discharge. If you are in need of a general medicine physician and post-discharge medical follow-up for further care/recommendations you may contact the [facility] for an appointment at [phone].     Follow up with pulmonology within 1-2 weeks of discharge. Pulmonary/Sleep Clinic  [address] [place] [address]  [phone]     Follow up with oncology within 1-2 weeks of discharge.

## 2023-08-21 PROBLEM — Z00.00 ENCOUNTER FOR PREVENTIVE HEALTH EXAMINATION: Status: ACTIVE | Noted: 2023-01-01

## 2023-08-21 PROBLEM — J96.01 ACUTE RESPIRATORY FAILURE WITH HYPOXIA: Status: ACTIVE | Noted: 2023-01-01

## 2023-08-21 PROBLEM — C7A.8 NEUROENDOCRINE CARCINOMA METASTATIC TO LUNG: Status: ACTIVE | Noted: 2023-01-01

## 2023-08-21 NOTE — ASSESSMENT
[FreeTextEntry1] : 67 y/o woman, extensive met neuroendocrine tumor, d/c from [facility] 8/17 -  extensive pulmonary involvement, s/p IP bronch with debulking and balloon and tx post obstructive pna still required 02 at d/c for hypoxia feeling better. using 02  will be following up with [facility] - Dr. Fredy Rodriguez I have called his office to let them know of her recent admission and request that she be seen this week and confirmed they have access to our records

## 2023-08-21 NOTE — HISTORY OF PRESENT ILLNESS
[Home] : at home, [unfilled] , at the time of the visit. [Medical Office: ([facility])___] : at the medical office located in  [Verbal consent obtained from patient] : the patient, [unfilled] [Discharged with Oxygen] : Discharged with oxygen [[facility]] : Post-hospitalization from [facility] [Lung cancer] : Lung cancer [Yes] : Yes [Admitted on: ___] : The patient was admitted on [unfilled] [Discharged on ___] : discharged on [unfilled] [Discharge Summary] : discharge summary [Pertinent Labs] : pertinent labs [Radiology Findings] : radiology findings [Discharge Med List] : discharge medication list [Med Reconciliation] : medication reconciliation has been completed [Patient Contacted By: ____] : and contacted by [unfilled] [FreeTextEntry2] : post d/c televisit  67 y/o woman, met neurendocrine cancer. extensive pulm involvement followed at [facility], on keytruda, premtrex admitted with increasing sob, tumor burden, s/p IP with tumor debulking and tx for post obstructive pna d/c with new home 02  feeling a lot better. using 02 continuously sob improved

## 2023-08-21 NOTE — PHYSICAL EXAM
[No Respiratory Distress] : no respiratory distress  [No Accessory Muscle Use] : no accessory muscle use [Normal] : affect was normal and insight and judgment were intact [de-identified] : on nasal cannula [64608 - High Complexity requires an extensive number of possible diagnoses and/or the management options, extensive complexity of the medical data (tests, etc.) to be reviewed, and a high risk of significant complications, morbidity, and/or mortality as w] : High Complexity

## 2024-01-01 ENCOUNTER — INPATIENT (INPATIENT)
Facility: HOSPITAL | Age: 69
LOS: 0 days | End: 2024-02-20
Attending: STUDENT IN AN ORGANIZED HEALTH CARE EDUCATION/TRAINING PROGRAM | Admitting: STUDENT IN AN ORGANIZED HEALTH CARE EDUCATION/TRAINING PROGRAM
Payer: SUBSIDIZED

## 2024-01-01 VITALS
RESPIRATION RATE: 22 BRPM | SYSTOLIC BLOOD PRESSURE: 112 MMHG | DIASTOLIC BLOOD PRESSURE: 75 MMHG | OXYGEN SATURATION: 94 % | HEART RATE: 132 BPM | TEMPERATURE: 98 F

## 2024-01-01 VITALS
SYSTOLIC BLOOD PRESSURE: 100 MMHG | TEMPERATURE: 97 F | DIASTOLIC BLOOD PRESSURE: 60 MMHG | RESPIRATION RATE: 20 BRPM | OXYGEN SATURATION: 88 % | HEART RATE: 102 BPM

## 2024-01-01 DIAGNOSIS — J96.01 ACUTE RESPIRATORY FAILURE WITH HYPOXIA: ICD-10-CM

## 2024-01-01 DIAGNOSIS — Z29.9 ENCOUNTER FOR PROPHYLACTIC MEASURES, UNSPECIFIED: ICD-10-CM

## 2024-01-01 DIAGNOSIS — Z71.89 OTHER SPECIFIED COUNSELING: ICD-10-CM

## 2024-01-01 DIAGNOSIS — E11.9 TYPE 2 DIABETES MELLITUS WITHOUT COMPLICATIONS: ICD-10-CM

## 2024-01-01 DIAGNOSIS — C34.90 MALIGNANT NEOPLASM OF UNSPECIFIED PART OF UNSPECIFIED BRONCHUS OR LUNG: ICD-10-CM

## 2024-01-01 DIAGNOSIS — J44.9 CHRONIC OBSTRUCTIVE PULMONARY DISEASE, UNSPECIFIED: ICD-10-CM

## 2024-01-01 DIAGNOSIS — A41.9 SEPSIS, UNSPECIFIED ORGANISM: ICD-10-CM

## 2024-01-01 LAB
ADD ON TEST-SPECIMEN IN LAB: SIGNIFICANT CHANGE UP
ALBUMIN SERPL ELPH-MCNC: 4 G/DL — SIGNIFICANT CHANGE UP (ref 3.3–5)
ALP SERPL-CCNC: 109 U/L — SIGNIFICANT CHANGE UP (ref 40–120)
ALT FLD-CCNC: 24 U/L — SIGNIFICANT CHANGE UP (ref 4–33)
ANION GAP SERPL CALC-SCNC: 17 MMOL/L — HIGH (ref 7–14)
APTT BLD: 26 SEC — SIGNIFICANT CHANGE UP (ref 24.5–35.6)
AST SERPL-CCNC: 57 U/L — HIGH (ref 4–32)
B PERT DNA SPEC QL NAA+PROBE: SIGNIFICANT CHANGE UP
B PERT+PARAPERT DNA PNL SPEC NAA+PROBE: SIGNIFICANT CHANGE UP
BASE EXCESS BLDV CALC-SCNC: -2.7 MMOL/L — LOW (ref -2–3)
BASOPHILS # BLD AUTO: 0.06 K/UL — SIGNIFICANT CHANGE UP (ref 0–0.2)
BASOPHILS NFR BLD AUTO: 0.3 % — SIGNIFICANT CHANGE UP (ref 0–2)
BILIRUB SERPL-MCNC: 0.3 MG/DL — SIGNIFICANT CHANGE UP (ref 0.2–1.2)
BLD GP AB SCN SERPL QL: NEGATIVE — SIGNIFICANT CHANGE UP
BLOOD GAS VENOUS COMPREHENSIVE RESULT: SIGNIFICANT CHANGE UP
BORDETELLA PARAPERTUSSIS (RAPRVP): SIGNIFICANT CHANGE UP
BUN SERPL-MCNC: 11 MG/DL — SIGNIFICANT CHANGE UP (ref 7–23)
C PNEUM DNA SPEC QL NAA+PROBE: SIGNIFICANT CHANGE UP
CALCIUM SERPL-MCNC: 10.2 MG/DL — SIGNIFICANT CHANGE UP (ref 8.4–10.5)
CHLORIDE BLDV-SCNC: 96 MMOL/L — SIGNIFICANT CHANGE UP (ref 96–108)
CHLORIDE SERPL-SCNC: 93 MMOL/L — LOW (ref 98–107)
CK MB BLD-MCNC: 10.4 % — HIGH (ref 0–2.5)
CK MB CFR SERPL CALC: 19 NG/ML — HIGH
CK SERPL-CCNC: 182 U/L — HIGH (ref 25–170)
CO2 BLDV-SCNC: 24.6 MMOL/L — SIGNIFICANT CHANGE UP (ref 22–26)
CO2 SERPL-SCNC: 20 MMOL/L — LOW (ref 22–31)
CREAT SERPL-MCNC: 0.41 MG/DL — LOW (ref 0.5–1.3)
EGFR: 106 ML/MIN/1.73M2 — SIGNIFICANT CHANGE UP
EOSINOPHIL # BLD AUTO: 0.03 K/UL — SIGNIFICANT CHANGE UP (ref 0–0.5)
EOSINOPHIL NFR BLD AUTO: 0.1 % — SIGNIFICANT CHANGE UP (ref 0–6)
FLUAV SUBTYP SPEC NAA+PROBE: SIGNIFICANT CHANGE UP
FLUBV RNA SPEC QL NAA+PROBE: SIGNIFICANT CHANGE UP
GAS PNL BLDV: 127 MMOL/L — LOW (ref 136–145)
GLUCOSE BLDV-MCNC: 406 MG/DL — HIGH (ref 70–99)
GLUCOSE SERPL-MCNC: 366 MG/DL — HIGH (ref 70–99)
HADV DNA SPEC QL NAA+PROBE: SIGNIFICANT CHANGE UP
HCO3 BLDV-SCNC: 23 MMOL/L — SIGNIFICANT CHANGE UP (ref 22–29)
HCOV 229E RNA SPEC QL NAA+PROBE: SIGNIFICANT CHANGE UP
HCOV HKU1 RNA SPEC QL NAA+PROBE: SIGNIFICANT CHANGE UP
HCOV NL63 RNA SPEC QL NAA+PROBE: SIGNIFICANT CHANGE UP
HCOV OC43 RNA SPEC QL NAA+PROBE: SIGNIFICANT CHANGE UP
HCT VFR BLD CALC: 33.3 % — LOW (ref 34.5–45)
HCT VFR BLDA CALC: 32 % — LOW (ref 34.5–46.5)
HGB BLD CALC-MCNC: 10.7 G/DL — LOW (ref 11.7–16.1)
HGB BLD-MCNC: 10.3 G/DL — LOW (ref 11.5–15.5)
HMPV RNA SPEC QL NAA+PROBE: SIGNIFICANT CHANGE UP
HPIV1 RNA SPEC QL NAA+PROBE: SIGNIFICANT CHANGE UP
HPIV2 RNA SPEC QL NAA+PROBE: SIGNIFICANT CHANGE UP
HPIV3 RNA SPEC QL NAA+PROBE: SIGNIFICANT CHANGE UP
HPIV4 RNA SPEC QL NAA+PROBE: SIGNIFICANT CHANGE UP
IANC: 16.91 K/UL — HIGH (ref 1.8–7.4)
IMM GRANULOCYTES NFR BLD AUTO: 1.1 % — HIGH (ref 0–0.9)
INR BLD: 1.15 RATIO — SIGNIFICANT CHANGE UP (ref 0.85–1.18)
LACTATE BLDV-MCNC: 5.7 MMOL/L — CRITICAL HIGH (ref 0.5–2)
LIDOCAIN IGE QN: 8 U/L — SIGNIFICANT CHANGE UP (ref 7–60)
LYMPHOCYTES # BLD AUTO: 1.76 K/UL — SIGNIFICANT CHANGE UP (ref 1–3.3)
LYMPHOCYTES # BLD AUTO: 8.8 % — LOW (ref 13–44)
M PNEUMO DNA SPEC QL NAA+PROBE: SIGNIFICANT CHANGE UP
MAGNESIUM SERPL-MCNC: 1.8 MG/DL — SIGNIFICANT CHANGE UP (ref 1.6–2.6)
MCHC RBC-ENTMCNC: 28.2 PG — SIGNIFICANT CHANGE UP (ref 27–34)
MCHC RBC-ENTMCNC: 30.9 GM/DL — LOW (ref 32–36)
MCV RBC AUTO: 91.2 FL — SIGNIFICANT CHANGE UP (ref 80–100)
MONOCYTES # BLD AUTO: 1.06 K/UL — HIGH (ref 0–0.9)
MONOCYTES NFR BLD AUTO: 5.3 % — SIGNIFICANT CHANGE UP (ref 2–14)
NEUTROPHILS # BLD AUTO: 16.91 K/UL — HIGH (ref 1.8–7.4)
NEUTROPHILS NFR BLD AUTO: 84.4 % — HIGH (ref 43–77)
NRBC # BLD: 0 /100 WBCS — SIGNIFICANT CHANGE UP (ref 0–0)
NRBC # FLD: 0 K/UL — SIGNIFICANT CHANGE UP (ref 0–0)
PCO2 BLDV: 44 MMHG — SIGNIFICANT CHANGE UP (ref 39–52)
PH BLDV: 7.33 — SIGNIFICANT CHANGE UP (ref 7.32–7.43)
PHOSPHATE SERPL-MCNC: 3.3 MG/DL — SIGNIFICANT CHANGE UP (ref 2.5–4.5)
PLATELET # BLD AUTO: 418 K/UL — HIGH (ref 150–400)
PO2 BLDV: 43 MMHG — SIGNIFICANT CHANGE UP (ref 25–45)
POTASSIUM BLDV-SCNC: 5.6 MMOL/L — HIGH (ref 3.5–5.1)
POTASSIUM SERPL-MCNC: 5 MMOL/L — SIGNIFICANT CHANGE UP (ref 3.5–5.3)
POTASSIUM SERPL-SCNC: 5 MMOL/L — SIGNIFICANT CHANGE UP (ref 3.5–5.3)
PROT SERPL-MCNC: 8.2 G/DL — SIGNIFICANT CHANGE UP (ref 6–8.3)
PROTHROM AB SERPL-ACNC: 12.9 SEC — SIGNIFICANT CHANGE UP (ref 9.5–13)
RAPID RVP RESULT: SIGNIFICANT CHANGE UP
RBC # BLD: 3.65 M/UL — LOW (ref 3.8–5.2)
RBC # FLD: 17.2 % — HIGH (ref 10.3–14.5)
RH IG SCN BLD-IMP: POSITIVE — SIGNIFICANT CHANGE UP
RSV RNA SPEC QL NAA+PROBE: SIGNIFICANT CHANGE UP
RV+EV RNA SPEC QL NAA+PROBE: SIGNIFICANT CHANGE UP
SAO2 % BLDV: 61.5 % — LOW (ref 67–88)
SARS-COV-2 RNA SPEC QL NAA+PROBE: SIGNIFICANT CHANGE UP
SODIUM SERPL-SCNC: 130 MMOL/L — LOW (ref 135–145)
TROPONIN T, HIGH SENSITIVITY RESULT: 433 NG/L — CRITICAL HIGH
TROPONIN T, HIGH SENSITIVITY RESULT: 502 NG/L — CRITICAL HIGH
WBC # BLD: 20.05 K/UL — HIGH (ref 3.8–10.5)
WBC # FLD AUTO: 20.05 K/UL — HIGH (ref 3.8–10.5)

## 2024-01-01 PROCEDURE — 99223 1ST HOSP IP/OBS HIGH 75: CPT | Mod: 25

## 2024-01-01 PROCEDURE — 93308 TTE F-UP OR LMTD: CPT | Mod: 26

## 2024-01-01 PROCEDURE — G0316 PROLONG INPT EVAL ADD15 M: CPT | Mod: 25

## 2024-01-01 PROCEDURE — 99284 EMERGENCY DEPT VISIT MOD MDM: CPT

## 2024-01-01 PROCEDURE — 71275 CT ANGIOGRAPHY CHEST: CPT | Mod: 26,MA

## 2024-01-01 PROCEDURE — 99285 EMERGENCY DEPT VISIT HI MDM: CPT | Mod: 25

## 2024-01-01 PROCEDURE — 74177 CT ABD & PELVIS W/CONTRAST: CPT | Mod: 26,MA

## 2024-01-01 PROCEDURE — 71045 X-RAY EXAM CHEST 1 VIEW: CPT | Mod: 26

## 2024-01-01 PROCEDURE — 99283 EMERGENCY DEPT VISIT LOW MDM: CPT | Mod: GC

## 2024-01-01 PROCEDURE — 76937 US GUIDE VASCULAR ACCESS: CPT | Mod: 26

## 2024-01-01 PROCEDURE — 36000 PLACE NEEDLE IN VEIN: CPT

## 2024-01-01 PROCEDURE — 99497 ADVNCD CARE PLAN 30 MIN: CPT | Mod: 25

## 2024-01-01 RX ORDER — INSULIN LISPRO 100/ML
VIAL (ML) SUBCUTANEOUS
Refills: 0 | Status: DISCONTINUED | OUTPATIENT
Start: 2024-01-01 | End: 2024-01-01

## 2024-01-01 RX ORDER — POLYETHYLENE GLYCOL 3350 17 G/17G
17 POWDER, FOR SOLUTION ORAL DAILY
Refills: 0 | Status: DISCONTINUED | OUTPATIENT
Start: 2024-01-01 | End: 2024-01-01

## 2024-01-01 RX ORDER — DEXTROSE 50 % IN WATER 50 %
15 SYRINGE (ML) INTRAVENOUS ONCE
Refills: 0 | Status: DISCONTINUED | OUTPATIENT
Start: 2024-01-01 | End: 2024-01-01

## 2024-01-01 RX ORDER — SODIUM CHLORIDE 9 MG/ML
2000 INJECTION INTRAMUSCULAR; INTRAVENOUS; SUBCUTANEOUS ONCE
Refills: 0 | Status: COMPLETED | OUTPATIENT
Start: 2024-01-01 | End: 2024-01-01

## 2024-01-01 RX ORDER — IPRATROPIUM/ALBUTEROL SULFATE 18-103MCG
3 AEROSOL WITH ADAPTER (GRAM) INHALATION ONCE
Refills: 0 | Status: COMPLETED | OUTPATIENT
Start: 2024-01-01 | End: 2024-01-01

## 2024-01-01 RX ORDER — SODIUM CHLORIDE 9 MG/ML
1000 INJECTION, SOLUTION INTRAVENOUS
Refills: 0 | Status: DISCONTINUED | OUTPATIENT
Start: 2024-01-01 | End: 2024-01-01

## 2024-01-01 RX ORDER — CEFEPIME 1 G/1
1000 INJECTION, POWDER, FOR SOLUTION INTRAMUSCULAR; INTRAVENOUS ONCE
Refills: 0 | Status: COMPLETED | OUTPATIENT
Start: 2024-01-01 | End: 2024-01-01

## 2024-01-01 RX ORDER — MORPHINE SULFATE 50 MG/1
2 CAPSULE, EXTENDED RELEASE ORAL ONCE
Refills: 0 | Status: DISCONTINUED | OUTPATIENT
Start: 2024-01-01 | End: 2024-01-01

## 2024-01-01 RX ORDER — MORPHINE SULFATE 50 MG/1
2 CAPSULE, EXTENDED RELEASE ORAL EVERY 4 HOURS
Refills: 0 | Status: DISCONTINUED | OUTPATIENT
Start: 2024-01-01 | End: 2024-01-01

## 2024-01-01 RX ORDER — MORPHINE SULFATE 50 MG/1
4 CAPSULE, EXTENDED RELEASE ORAL EVERY 4 HOURS
Refills: 0 | Status: DISCONTINUED | OUTPATIENT
Start: 2024-01-01 | End: 2024-01-01

## 2024-01-01 RX ORDER — GLUCAGON INJECTION, SOLUTION 0.5 MG/.1ML
1 INJECTION, SOLUTION SUBCUTANEOUS ONCE
Refills: 0 | Status: DISCONTINUED | OUTPATIENT
Start: 2024-01-01 | End: 2024-01-01

## 2024-01-01 RX ORDER — ONDANSETRON 8 MG/1
4 TABLET, FILM COATED ORAL EVERY 8 HOURS
Refills: 0 | Status: DISCONTINUED | OUTPATIENT
Start: 2024-01-01 | End: 2024-01-01

## 2024-01-01 RX ORDER — DEXTROSE 50 % IN WATER 50 %
25 SYRINGE (ML) INTRAVENOUS ONCE
Refills: 0 | Status: DISCONTINUED | OUTPATIENT
Start: 2024-01-01 | End: 2024-01-01

## 2024-01-01 RX ORDER — SODIUM BICARBONATE 1 MEQ/ML
50 SYRINGE (ML) INTRAVENOUS ONCE
Refills: 0 | Status: COMPLETED | OUTPATIENT
Start: 2024-01-01 | End: 2024-01-01

## 2024-01-01 RX ORDER — IPRATROPIUM/ALBUTEROL SULFATE 18-103MCG
3 AEROSOL WITH ADAPTER (GRAM) INHALATION EVERY 6 HOURS
Refills: 0 | Status: DISCONTINUED | OUTPATIENT
Start: 2024-01-01 | End: 2024-01-01

## 2024-01-01 RX ORDER — UMECLIDINIUM 62.5 UG/1
1 AEROSOL, POWDER ORAL
Refills: 0 | DISCHARGE

## 2024-01-01 RX ORDER — LANOLIN ALCOHOL/MO/W.PET/CERES
3 CREAM (GRAM) TOPICAL AT BEDTIME
Refills: 0 | Status: DISCONTINUED | OUTPATIENT
Start: 2024-01-01 | End: 2024-01-01

## 2024-01-01 RX ORDER — INSULIN LISPRO 100/ML
VIAL (ML) SUBCUTANEOUS AT BEDTIME
Refills: 0 | Status: DISCONTINUED | OUTPATIENT
Start: 2024-01-01 | End: 2024-01-01

## 2024-01-01 RX ORDER — NALOXONE HYDROCHLORIDE 4 MG/.1ML
0.4 SPRAY NASAL ONCE
Refills: 0 | Status: DISCONTINUED | OUTPATIENT
Start: 2024-01-01 | End: 2024-01-01

## 2024-01-01 RX ORDER — CEFEPIME 1 G/1
2000 INJECTION, POWDER, FOR SOLUTION INTRAMUSCULAR; INTRAVENOUS EVERY 8 HOURS
Refills: 0 | Status: DISCONTINUED | OUTPATIENT
Start: 2024-01-01 | End: 2024-01-01

## 2024-01-01 RX ORDER — HYDROMORPHONE HYDROCHLORIDE 2 MG/ML
0.2 INJECTION INTRAMUSCULAR; INTRAVENOUS; SUBCUTANEOUS EVERY 4 HOURS
Refills: 0 | Status: DISCONTINUED | OUTPATIENT
Start: 2024-01-01 | End: 2024-01-01

## 2024-01-01 RX ORDER — INSULIN GLARGINE 100 [IU]/ML
8 INJECTION, SOLUTION SUBCUTANEOUS EVERY MORNING
Refills: 0 | Status: DISCONTINUED | OUTPATIENT
Start: 2024-01-01 | End: 2024-01-01

## 2024-01-01 RX ORDER — KETAMINE HYDROCHLORIDE 100 MG/ML
10 INJECTION INTRAMUSCULAR; INTRAVENOUS ONCE
Refills: 0 | Status: DISCONTINUED | OUTPATIENT
Start: 2024-01-01 | End: 2024-01-01

## 2024-01-01 RX ORDER — LIDOCAINE 4 G/100G
1 CREAM TOPICAL DAILY
Refills: 0 | Status: DISCONTINUED | OUTPATIENT
Start: 2024-01-01 | End: 2024-01-01

## 2024-01-01 RX ORDER — ACETAMINOPHEN 500 MG
1000 TABLET ORAL EVERY 8 HOURS
Refills: 0 | Status: DISCONTINUED | OUTPATIENT
Start: 2024-01-01 | End: 2024-01-01

## 2024-01-01 RX ORDER — DEXTROSE 50 % IN WATER 50 %
12.5 SYRINGE (ML) INTRAVENOUS ONCE
Refills: 0 | Status: DISCONTINUED | OUTPATIENT
Start: 2024-01-01 | End: 2024-01-01

## 2024-01-01 RX ORDER — SENNA PLUS 8.6 MG/1
2 TABLET ORAL AT BEDTIME
Refills: 0 | Status: DISCONTINUED | OUTPATIENT
Start: 2024-01-01 | End: 2024-01-01

## 2024-01-01 RX ORDER — VANCOMYCIN HCL 1 G
750 VIAL (EA) INTRAVENOUS EVERY 12 HOURS
Refills: 0 | Status: DISCONTINUED | OUTPATIENT
Start: 2024-01-01 | End: 2024-01-01

## 2024-01-01 RX ORDER — VANCOMYCIN HCL 1 G
1000 VIAL (EA) INTRAVENOUS ONCE
Refills: 0 | Status: COMPLETED | OUTPATIENT
Start: 2024-01-01 | End: 2024-01-01

## 2024-01-01 RX ORDER — HYDROMORPHONE HYDROCHLORIDE 2 MG/ML
0.5 INJECTION INTRAMUSCULAR; INTRAVENOUS; SUBCUTANEOUS EVERY 4 HOURS
Refills: 0 | Status: DISCONTINUED | OUTPATIENT
Start: 2024-01-01 | End: 2024-01-01

## 2024-01-01 RX ADMIN — Medication 3 MILLILITER(S): at 13:42

## 2024-01-01 RX ADMIN — Medication 250 MILLIGRAM(S): at 18:26

## 2024-01-01 RX ADMIN — CEFEPIME 100 MILLIGRAM(S): 1 INJECTION, POWDER, FOR SOLUTION INTRAMUSCULAR; INTRAVENOUS at 17:16

## 2024-01-01 RX ADMIN — SODIUM CHLORIDE 2000 MILLILITER(S): 9 INJECTION INTRAMUSCULAR; INTRAVENOUS; SUBCUTANEOUS at 13:38

## 2024-01-01 RX ADMIN — Medication 3 MILLILITER(S): at 13:38

## 2024-01-01 RX ADMIN — MORPHINE SULFATE 2 MILLIGRAM(S): 50 CAPSULE, EXTENDED RELEASE ORAL at 17:16

## 2024-01-01 RX ADMIN — HYDROMORPHONE HYDROCHLORIDE 0.5 MILLIGRAM(S): 2 INJECTION INTRAMUSCULAR; INTRAVENOUS; SUBCUTANEOUS at 04:24

## 2024-01-01 RX ADMIN — MORPHINE SULFATE 2 MILLIGRAM(S): 50 CAPSULE, EXTENDED RELEASE ORAL at 14:50

## 2024-01-01 RX ADMIN — Medication 3 MILLILITER(S): at 13:40

## 2024-01-01 RX ADMIN — Medication 50 MILLIEQUIVALENT(S): at 18:26

## 2024-02-19 NOTE — ED ADULT NURSE NOTE - OBJECTIVE STATEMENT
Pt A+Ox4.  Pt tachypneic with increased work of breathing.  Pt noted with wheezing in all fields.  Pt has hx of lung Ca and is on chemotherapy.  Pt uses oxygen 2L NC at home. Pt arrived 5 LNC and has 100% O2 sat.  Pt abdomen soft, skin intact.  IV placed right hand #20.  Pt to have ultrasounded IV placed for possible PE study.  Cardiac monitor in place- . Pt A+Ox4.  Pt tachypneic with increased work of breathing, Pt unable to speak more than 1 or 2 words.  Pt noted with wheezing in all fields.  Pt has hx of lung Ca and is on chemotherapy.  Pt uses oxygen 2L NC at home. Pt arrived 5 LNC and has 100% O2 sat.  Pt abdomen soft, skin intact.  IV placed right hand #20.  Pt to have ultrasounded IV placed for possible PE study.  Cardiac monitor in place- .

## 2024-02-19 NOTE — CONSULT NOTE ADULT - ASSESSMENT
Patient is a 69-year-old female with PMHx metastatic lung cancer on chemotherapy, last treatment one week ago, COPD on 2 L nasal cannula at baseline, diabetes, presenting with 1 week onset of shortness of breath.  Associated symptoms of chest pain, cough.  No fever, no other symptoms. Cardiology consulted for concern for troponin to 296 with uptrend to 433. Patient noted to be increased work of breathing on BIPAP and otherwise appears stable. Denying active chest pain. Pending CK/CKMB/CPK, but troponinemia likely attributable to acute hypoxic respiratory failure and possible sepsis as patient with leukocytosis, tachycardia, hypothermia, and uptrending lactate. Low suspicion for ACS, and patient DNR/DNI per chart review, MOLST found in patient chart.    PLAN:  #Elevated troponin, concern for NSTEMI  - Troponin 269-> 433  - Follow up CK-MB , CK , CPK   - Would not treat as ACS at this time  - EKG significant for sinus tachycardia without evidence of ischemia  - Serial EKGs PRN for chest pain  - Ongoing infectious workup and management of respiratory distress per primary medicine team  - Ongoing GOC discussion per primary medicine team, patient DNR/DNI          Case to be discussed  Should patient HD status change, please call cardiology at #[phone] once again.  Note Incomplete, Attending attestation to follow for final recommendations     Patient is a 69-year-old female with PMHx metastatic lung cancer on chemotherapy, last treatment one week ago, COPD on 2 L nasal cannula at baseline, diabetes, presenting with 1 week onset of shortness of breath.  Associated symptoms of chest pain, cough.  No fever, no other symptoms. Cardiology consulted for concern for troponin to 296 with uptrend to 433. Patient noted to be increased work of breathing on BIPAP and otherwise appears stable. Denying active chest pain. Pending CK/CKMB/CPK, but troponinemia likely attributable to acute hypoxic respiratory failure and possible sepsis as patient with leukocytosis, tachycardia, hypothermia, and uptrending lactate. Low suspicion for ACS, and patient DNR/DNI per chart review, MOLST found in patient chart.    PLAN:  #Elevated troponin, concern for NSTEMI  - Troponin 269-> 433  - Follow up CK-MB , CK , CPK   - Would not treat as ACS at this time  - EKG significant for sinus tachycardia without evidence of ischemia  - Serial EKGs PRN for chest pain  - Ongoing infectious workup and management of respiratory distress per primary medicine team  - Ongoing GOC discussion per primary medicine team, patient DNR/DNI          Case discussed with cardiology fellow Theron Torrez MD  Should patient HD status change, please call cardiology at #[phone] once again.  Note Incomplete, Attending attestation to follow for final recommendations

## 2024-02-19 NOTE — CONSULT NOTE ADULT - SUBJECTIVE AND OBJECTIVE BOX
CHIEF COMPLAINT: Respiratory distress    HISTORY OF PRESENT ILLNESS:  Patient is a 69-year-old female with PMHx metastatic lung cancer on chemotherapy, last treatment one week ago, COPD on 2 L nasal cannula at baseline, diabetes, presenting with 1 week onset of shortness of breath.  Associated symptoms of chest pain, cough.  No fever, no other symptoms. Cardiology consulted for concern for troponin to 296 with uptrend to 433. On interview with patient, she denies active chest pain and endorsing difficulty breathing, oxygen requirement increased from 6L NC until BIPAP. Patient denies prior cardiac history, does not follow with outpatient cardiologist. Reports history of former tobacco use, denies ETOH use.     ED course- patient noted to be increased work of breathing presenting from EMS with 94% on 6L NC, escalated O2 requirement to BIPAP. troponin 269-> 433, HR 120s-140s, EKG demonstrating ST with no acute ischemic changes, similar compared to previous 8/2023 EKG, VBG pH 7.33-> 7.18, lact 5.7-> 9.1, rectal temp 96, WBC 20.02, CTA C/A/P negative for PE and demonstrating evidence of metastatic disease        Allergies  No Known Allergies    	    MEDICATIONS:                  PAST MEDICAL & SURGICAL HISTORY:  DM (diabetes mellitus)  Cancer of lung  No significant past surgical history          FAMILY HISTORY:  No pertinent family history in first degree relatives        SOCIAL HISTORY:    [ ] Non-smoker  [ x ] Former Smoker  [ ] Alcohol  [ ] Denies Alcohol      REVIEW OF SYSTEMS:  CONSTITUTIONAL: no fevers or chills  EYES/ENT: No visual changes; No vertigo or throat pain  NECK: No JVD  RESPIRATORY: +SOB No cough, wheezing, chills or hemoptysis  CARDIOVASCULAR: No chest pain, palpitations, passing out, dizziness, or leg swelling  GASTROINTESTINAL: No abdominal or epigastric pain. No nausea/vomiting/melena  Genitourinary: No dysuria, frequency or hematuria  NEUROLOGICAL: No numbness or weakness  SKIN: No itching, burning, rashes or lesions      PHYSICAL EXAM:  T(C): 35.6 (02-19-24 @ 18:18), Max: 36.6 (02-19-24 @ 11:54)  HR: 124 (02-19-24 @ 17:32) (124 - 145)  BP: 110/62 (02-19-24 @ 17:32) (110/62 - 120/75)  RR: 30 (02-19-24 @ 17:32) (22 - 36)  SpO2: 100% (02-19-24 @ 20:06) (94% - 100%)  Wt(kg): --  ICU Vital Signs Last 24 Hrs  T(C): 35.6 (19 Feb 2024 18:18), Max: 36.6 (19 Feb 2024 11:54)  T(F): 96 (19 Feb 2024 18:18), Max: 97.9 (19 Feb 2024 11:54)  HR: 124 (19 Feb 2024 17:32) (124 - 145)  BP: 110/62 (19 Feb 2024 17:32) (110/62 - 120/75)  RR: 30 (19 Feb 2024 17:32) (22 - 36)  SpO2: 100% (19 Feb 2024 20:06) (94% - 100%)    O2 Parameters below as of 19 Feb 2024 17:32  Patient On (Oxygen Delivery Method): BiPAP/CPAP          I&O's Summary        Appearance: Normal	  HEENT:   Normal oral mucosa	  Cardiovascular: Normal S1 S2, No JVD, No murmurs, No edema  Respiratory: Lungs clear to auscultation	  Psychiatry: A & O x 3, Mood & affect appropriate  Gastrointestinal:  Soft, Non-tender, + BS	  Skin: No rashes, No ecchymoses, No cyanosis	  Neurologic: Non-focal  Extremities: Warm and well perfused. 2+ pulses bilaterally        LABS:	 	  CBC Full  -  ( 19 Feb 2024 13:30 )  WBC Count : 20.05 K/uL  Hemoglobin : 10.3 g/dL  Hematocrit : 33.3 %  Platelet Count - Automated : 418 K/uL  Mean Cell Volume : 91.2 fL  Mean Cell Hemoglobin : 28.2 pg  Mean Cell Hemoglobin Concentration : 30.9 gm/dL  Auto Neutrophil # : 16.91 K/uL  Auto Lymphocyte # : 1.76 K/uL  Auto Monocyte # : 1.06 K/uL  Auto Eosinophil # : 0.03 K/uL  Auto Basophil # : 0.06 K/uL  Auto Neutrophil % : 84.4 %  Auto Lymphocyte % : 8.8 %  Auto Monocyte % : 5.3 %  Auto Eosinophil % : 0.1 %  Auto Basophil % : 0.3 %    02-19    130<L>  |  93<L>  |  11  ----------------------------<  366<H>  5.0   |  20<L>  |  0.41<L>    Ca    10.2      19 Feb 2024 13:30  Phos  3.3     02-19  Mg     1.80     02-19    TPro  8.2  /  Alb  4.0  /  TBili  0.3  /  DBili  x   /  AST  57<H>  /  ALT  24  /  AlkPhos  109  02-19      proBNP: 388  Lipid Profile:   HgA1c:   TSH:     CARDIAC MARKERS:  Trop 269-> 433                  PREVIOUS DIAGNOSTIC TESTING:    [ X ] CTA C/A/P  CT Angio Chest PE Protocol w/ IV Cont (02.19.24 @ 18:06)  FINDINGS: Motion artifacts degrade some of the imaging.  Some images are degraded by artifacts from the patient's arms within the   scanning field of view.    CHEST:  LUNGS AND LARGE AIRWAYS:Increased narrowing of upper trachea probably   due to extrinsic compression along right posterior lateral wall by   enlarging lymphadenopathy which is partially obscured by artifact.  Narrowed lower trachea, bill, both mainstem bronchi and all lobar   bronchi by confluent mediastinal and hilar lymphadenopathy. Expiratory   phase imaging.  Similar heterogeneous consolidation of right lower lobe with air   bronchograms probably due to combination of metastatic disease and   postobstructive atelectasis  Progressive widespread bilateral pulmonary metastases appear increased in   size and number  Asymmetric elevation of the right hemidiaphragm.  PLEURA: No large pleural effusion. Increased right pleural and   diaphragmatic metastases. No pneumothorax  VESSELS: No acute pulmonary embolism. Aortic and coronary artery   atherosclerotic calcification  HEART: Heart size is normal. No pericardial effusion.  MEDIASTINUM AND DAMASO: Confluent mediastinal and hilar lymphadenopathy   appears increased  CHEST WALL AND LOWER NECK: Right lower chest wall tumor appears to be   associated with increased rib destruction (301, 70)    ABDOMEN AND PELVIS:  LIVER: Heterogeneous liver enhancement with dilated IVC and hepatic veins   may represent nutmeg liverfrom right heart dysfunction.. Periportal   edema.  BILE DUCTS: Normal caliber.  GALLBLADDER: Decompressed. Nonspecific diffuse gallbladder wall thickening  SPLEEN: Within normal limits.  PANCREAS: Within normal limits.  ADRENALS: Increased bilateraladrenal nodules presumably metastatic   disease  KIDNEYS/URETERS: Symmetric renal enhancement without hydronephrosis.    BLADDER: Minimally distended.  REPRODUCTIVE ORGANS: Atrophic    BOWEL: Rectal distention with hyperdense material without wall   thickening. No bowel obstruction.  PERITONEUM: No ascites.. No free air  VESSELS: Atherosclerotic changes.  RETROPERITONEUM/LYMPH NODES: No lymphadenopathy.  ABDOMINAL WALL: Tiny fat-containing umbilical hernia.  BONES: Bone metastases involving T10 vertebral body is new since the   prior exam.  Additional mixed lytic and sclerotic bone metastases within the sacrum   and L5 vertebral bodies    IMPRESSION:  No acute pulmonary embolism.    Similar heterogeneous consolidation of right lower lobe withair   bronchograms probably due to combination of metastatic disease and   postobstructive atelectasis    Progressive widespread bilateral pulmonary, mediastinal and hilar zoran,   right pleural, diaphragmatic, chest wall, bilateral adrenal, and bone  metastatic disease as described.    [ ] Echocardiogram:  [ ]  Catheterization:  [ ] Stress Test:  	   CHIEF COMPLAINT: Respiratory distress    HISTORY OF PRESENT ILLNESS:  Patient is a 69-year-old female with PMHx metastatic lung cancer on chemotherapy, last treatment one week ago, COPD on 2 L nasal cannula at baseline, diabetes, presenting with 1 week onset of shortness of breath.  Associated symptoms of chest pain, cough.  No fever, no other symptoms. Cardiology consulted for concern for troponin to 296 with uptrend to 433. On interview with patient, she denies active chest pain and endorsing difficulty breathing, oxygen requirement increased from 6L NC until BIPAP. Patient denies prior cardiac history, does not follow with outpatient cardiologist. Reports history of former tobacco use, denies ETOH use.     ED course- patient noted to be increased work of breathing presenting from EMS with 94% on 6L NC, escalated O2 requirement to BIPAP. troponin 269-> 433, HR 120s-140s, EKG demonstrating ST with no acute ischemic changes, similar compared to previous 8/2023 EKG, VBG pH 7.33-> 7.18, lact 5.7-> 9.1, rectal temp 96, WBC 20.02, CTA C/A/P negative for PE and demonstrating evidence of metastatic disease        Allergies  No Known Allergies    	    MEDICATIONS:                  PAST MEDICAL & SURGICAL HISTORY:  DM (diabetes mellitus)  Cancer of lung  No significant past surgical history          FAMILY HISTORY:  No pertinent family history in first degree relatives        SOCIAL HISTORY:    [ ] Non-smoker  [ x ] Former Smoker  [ ] Alcohol  [ ] Denies Alcohol      REVIEW OF SYSTEMS:  CONSTITUTIONAL: no fevers or chills  EYES/ENT: No visual changes; No vertigo or throat pain  NECK: No JVD  RESPIRATORY: +SOB No cough, wheezing, chills or hemoptysis  CARDIOVASCULAR: No chest pain, palpitations, passing out, dizziness, or leg swelling  GASTROINTESTINAL: No abdominal or epigastric pain. No nausea/vomiting/melena  Genitourinary: No dysuria, frequency or hematuria  NEUROLOGICAL: No numbness or weakness  SKIN: No itching, burning, rashes or lesions      PHYSICAL EXAM:  T(C): 35.6 (02-19-24 @ 18:18), Max: 36.6 (02-19-24 @ 11:54)  HR: 124 (02-19-24 @ 17:32) (124 - 145)  BP: 110/62 (02-19-24 @ 17:32) (110/62 - 120/75)  RR: 30 (02-19-24 @ 17:32) (22 - 36)  SpO2: 100% (02-19-24 @ 20:06) (94% - 100%)  Wt(kg): --  ICU Vital Signs Last 24 Hrs  T(C): 35.6 (19 Feb 2024 18:18), Max: 36.6 (19 Feb 2024 11:54)  T(F): 96 (19 Feb 2024 18:18), Max: 97.9 (19 Feb 2024 11:54)  HR: 124 (19 Feb 2024 17:32) (124 - 145)  BP: 110/62 (19 Feb 2024 17:32) (110/62 - 120/75)  RR: 30 (19 Feb 2024 17:32) (22 - 36)  SpO2: 100% (19 Feb 2024 20:06) (94% - 100%)    O2 Parameters below as of 19 Feb 2024 17:32  Patient On (Oxygen Delivery Method): BiPAP/CPAP          I&O's Summary        Appearance: Cachectic and ill appearing, increased WOB	  HEENT:   Normal oral mucosa	  Cardiovascular: Normal S1 S2, No JVD, No murmurs, No edema  Respiratory: Increased WOB, +B/L diffuse rhonchi  Psychiatry: A & O x 3, Mood & affect appropriate  Gastrointestinal:  Soft, Non-tender, + BS	  Skin: No rashes, No ecchymoses, No cyanosis	  Neurologic: Non-focal  Extremities: Warm and well perfused. 2+ pulses bilaterally        LABS:	 	  CBC Full  -  ( 19 Feb 2024 13:30 )  WBC Count : 20.05 K/uL  Hemoglobin : 10.3 g/dL  Hematocrit : 33.3 %  Platelet Count - Automated : 418 K/uL  Mean Cell Volume : 91.2 fL  Mean Cell Hemoglobin : 28.2 pg  Mean Cell Hemoglobin Concentration : 30.9 gm/dL  Auto Neutrophil # : 16.91 K/uL  Auto Lymphocyte # : 1.76 K/uL  Auto Monocyte # : 1.06 K/uL  Auto Eosinophil # : 0.03 K/uL  Auto Basophil # : 0.06 K/uL  Auto Neutrophil % : 84.4 %  Auto Lymphocyte % : 8.8 %  Auto Monocyte % : 5.3 %  Auto Eosinophil % : 0.1 %  Auto Basophil % : 0.3 %    02-19    130<L>  |  93<L>  |  11  ----------------------------<  366<H>  5.0   |  20<L>  |  0.41<L>    Ca    10.2      19 Feb 2024 13:30  Phos  3.3     02-19  Mg     1.80     02-19    TPro  8.2  /  Alb  4.0  /  TBili  0.3  /  DBili  x   /  AST  57<H>  /  ALT  24  /  AlkPhos  109  02-19      proBNP: 388  Lipid Profile:   HgA1c:   TSH:     CARDIAC MARKERS:  Trop 269-> 433                  PREVIOUS DIAGNOSTIC TESTING:    [ X ] CTA C/A/P  CT Angio Chest PE Protocol w/ IV Cont (02.19.24 @ 18:06)  FINDINGS: Motion artifacts degrade some of the imaging.  Some images are degraded by artifacts from the patient's arms within the   scanning field of view.    CHEST:  LUNGS AND LARGE AIRWAYS:Increased narrowing of upper trachea probably   due to extrinsic compression along right posterior lateral wall by   enlarging lymphadenopathy which is partially obscured by artifact.  Narrowed lower trachea, bill, both mainstem bronchi and all lobar   bronchi by confluent mediastinal and hilar lymphadenopathy. Expiratory   phase imaging.  Similar heterogeneous consolidation of right lower lobe with air   bronchograms probably due to combination of metastatic disease and   postobstructive atelectasis  Progressive widespread bilateral pulmonary metastases appear increased in   size and number  Asymmetric elevation of the right hemidiaphragm.  PLEURA: No large pleural effusion. Increased right pleural and   diaphragmatic metastases. No pneumothorax  VESSELS: No acute pulmonary embolism. Aortic and coronary artery   atherosclerotic calcification  HEART: Heart size is normal. No pericardial effusion.  MEDIASTINUM AND DAMASO: Confluent mediastinal and hilar lymphadenopathy   appears increased  CHEST WALL AND LOWER NECK: Right lower chest wall tumor appears to be   associated with increased rib destruction (301, 70)    ABDOMEN AND PELVIS:  LIVER: Heterogeneous liver enhancement with dilated IVC and hepatic veins   may represent nutmeg liverfrom right heart dysfunction.. Periportal   edema.  BILE DUCTS: Normal caliber.  GALLBLADDER: Decompressed. Nonspecific diffuse gallbladder wall thickening  SPLEEN: Within normal limits.  PANCREAS: Within normal limits.  ADRENALS: Increased bilateraladrenal nodules presumably metastatic   disease  KIDNEYS/URETERS: Symmetric renal enhancement without hydronephrosis.    BLADDER: Minimally distended.  REPRODUCTIVE ORGANS: Atrophic    BOWEL: Rectal distention with hyperdense material without wall   thickening. No bowel obstruction.  PERITONEUM: No ascites.. No free air  VESSELS: Atherosclerotic changes.  RETROPERITONEUM/LYMPH NODES: No lymphadenopathy.  ABDOMINAL WALL: Tiny fat-containing umbilical hernia.  BONES: Bone metastases involving T10 vertebral body is new since the   prior exam.  Additional mixed lytic and sclerotic bone metastases within the sacrum   and L5 vertebral bodies    IMPRESSION:  No acute pulmonary embolism.    Similar heterogeneous consolidation of right lower lobe withair   bronchograms probably due to combination of metastatic disease and   postobstructive atelectasis    Progressive widespread bilateral pulmonary, mediastinal and hilar zoran,   right pleural, diaphragmatic, chest wall, bilateral adrenal, and bone  metastatic disease as described.    [ ] Echocardiogram:  [ ]  Catheterization:  [ ] Stress Test:

## 2024-02-19 NOTE — ED PROVIDER NOTE - OBJECTIVE STATEMENT
69-year-old female, history of lung cancer on chemotherapy, last treatment was a week ago, COPD on 2 L nasal cannula at baseline, diabetes, presenting with 1 week onset of shortness of breath.  Associated symptoms of chest pain, cough.  No fever, no other symptoms.

## 2024-02-19 NOTE — CONSULT NOTE ADULT - SUBJECTIVE AND OBJECTIVE BOX
CHIEF COMPLAINT:    HPI: 69F mhx of lung ca w/ diffuse mets (prior chemo however chemo now stopped) COPD on 2 L nasal cannula at baseline, diabetes, presenting with 1 week onset of shortness of breath presenting with respiratory distress. Patient able to talk in short sentences off of bipap. Fixated on receiving water and oral hydration for dry mouth. Denies feeling SOB and states comfortable with mask on. Denies pain. RR at 21, large air leak from bipap maske half off of face. Receiving 500cc/breath on 14/8 and 40%.     PAST MEDICAL & SURGICAL HISTORY:  DM (diabetes mellitus)      Cancer of lung      No significant past surgical history      FAMILY HISTORY:  No pertinent family history in first degree relatives      Allergies    No Known Allergies    Intolerances        HOME MEDICATIONS:    REVIEW OF SYSTEMS:    CONSTITUTIONAL:  No weakness, fevers or chills  EYES/ENT:  No visual changes;  No vertigo or throat pain   NECK:  No pain or stiffness  RESPIRATORY:  No cough, wheezing, hemoptysis; No shortness of breath  CARDIOVASCULAR:  No chest pain or palpitations  GASTROINTESTINAL:  No abdominal or epigastric pain. No nausea, vomiting, or hematemesis; No diarrhea or constipation. No melena or hematochezia.  GENITOURINARY:  No dysuria, frequency or hematuria  NEUROLOGICAL:  No numbness or weakness  SKIN:  No itching, rashes    [ ] Unable to assess ROS because ________    OBJECTIVE:  ICU Vital Signs Last 24 Hrs  T(C): 36.1 (19 Feb 2024 21:07), Max: 36.6 (19 Feb 2024 11:54)  T(F): 97 (19 Feb 2024 21:07), Max: 97.9 (19 Feb 2024 11:54)  HR: 110 (19 Feb 2024 21:07) (110 - 145)  BP: 121/60 (19 Feb 2024 21:07) (110/62 - 121/60)  BP(mean): --  ABP: --  ABP(mean): --  RR: 26 (19 Feb 2024 21:07) (22 - 36)  SpO2: 100% (19 Feb 2024 21:07) (94% - 100%)    O2 Parameters below as of 19 Feb 2024 21:07  Patient On (Oxygen Delivery Method): BiPAP/CPAP              CAPILLARY BLOOD GLUCOSE          Vital Signs Last 24 Hrs  T(C): 36.1 (19 Feb 2024 21:07), Max: 36.6 (19 Feb 2024 11:54)  T(F): 97 (19 Feb 2024 21:07), Max: 97.9 (19 Feb 2024 11:54)  HR: 110 (19 Feb 2024 21:07) (110 - 145)  BP: 121/60 (19 Feb 2024 21:07) (110/62 - 121/60)  BP(mean): --  RR: 26 (19 Feb 2024 21:07) (22 - 36)  SpO2: 100% (19 Feb 2024 21:07) (94% - 100%)    Parameters below as of 19 Feb 2024 21:07  Patient On (Oxygen Delivery Method): BiPAP/CPAP        General: WN/WD NAD  Neurology: A&Ox3, nonfocal, POST x 4  Eyes: PERRLA/ EOMI, Gross vision intact  ENT/Neck: Neck supple, trachea midline, No JVD, Gross hearing intact  Respiratory: b/l rhonchi  CV: RRR, +S1/S2, -S3/S4, no murmurs, rubs or gallops  Abdominal: Soft, NT, ND +BS, No HSM  MSK: 5/5 strength UE/LE bilaterally  Extremities: No edema, 2+ peripheral pulses  Skin: No Rashes, Hematoma, Ecchymosis  Incisions:   Tubes:    HOSPITAL MEDICATIONS:  MEDICATIONS  (STANDING):    MEDICATIONS  (PRN):      LABS:                        10.3   20.05 )-----------( 418      ( 19 Feb 2024 13:30 )             33.3     02-19    130<L>  |  93<L>  |  11  ----------------------------<  366<H>  5.0   |  20<L>  |  0.41<L>    Ca    10.2      19 Feb 2024 13:30  Phos  3.3     02-19  Mg     1.80     02-19    TPro  8.2  /  Alb  4.0  /  TBili  0.3  /  DBili  x   /  AST  57<H>  /  ALT  24  /  AlkPhos  109  02-19    PT/INR - ( 19 Feb 2024 13:30 )   PT: 12.9 sec;   INR: 1.15 ratio         PTT - ( 19 Feb 2024 13:30 )  PTT:26.0 sec  Urinalysis Basic - ( 19 Feb 2024 13:30 )    Color: x / Appearance: x / SG: x / pH: x  Gluc: 366 mg/dL / Ketone: x  / Bili: x / Urobili: x   Blood: x / Protein: x / Nitrite: x   Leuk Esterase: x / RBC: x / WBC x   Sq Epi: x / Non Sq Epi: x / Bacteria: x        Venous Blood Gas:  02-19 @ 17:21  7.18/48/36/18/47.3  VBG Lactate: 9.1  Venous Blood Gas:  02-19 @ 13:30  7.33/44/43/23/61.5  VBG Lactate: 5.7      MICROBIOLOGY:     RADIOLOGY:  [ ] Reviewed and interpreted by me    EKG:

## 2024-02-19 NOTE — ED ADULT NURSE NOTE - NSFALLRISKINTERV_ED_ALL_ED

## 2024-02-19 NOTE — CONSULT NOTE ADULT - ASSESSMENT
69F mhx of lung ca w/ diffuse mets (prior chemo however chemo now stopped) COPD on 2 L nasal cannula at baseline, diabetes, presenting with 1 week onset of shortness of breath presenting with respiratory distress concerning for end stage malignancy given significant metastatic disease reviewed on ct scan.     Please continue [facility] conversation with physician daughter (not at bedside at time of interview), especially regarding comfort care. Patient was alluding to not wanting to be on bipap and wanting to avoid aggressive therapies in the hospital, however was unable to provide adequate teach back to be considered consenting for comfort care.  Patient has been declining morphine as does not understand that it is for air hunger rather than pain, attempted to teach patient regarding morphine, however continues to deny.   Infectious work up and treatment per primary team  Unclear if rhonchi related to secretion burden, can trial pulmonary toilet and airway clearance however suspecting lung sounds secondary to severe metastatic disease.   Would continue bipap and trial HFNC  Patient tolerating BIPAP well  Not a MICU candidate at this time

## 2024-02-19 NOTE — ED ADULT NURSE NOTE - NSSEPSISNEWALTERMENTAL_ED_A_ED
"                                             Progress Note    Client Name: Lyn Santana  Date: 6/26/2017          Service Type: Individual      Session Start Time: 830  Session End Time: 915      Session Length: 45     Session #: 2     Attendees: Client attended alone    Treatment Plan Last Reviewed: 5/26/2017   PHQ-9 / LEONOR-7 :      DATA      Progress Since Last Session (Related to Symptoms / Goals / Homework):   Symptoms: Stable    Homework: Partially completed      Episode of Care Goals: Satisfactory progress - PREPARATION (Decided to change - considering how); Intervened by negotiating a change plan and determining options / strategies for behavior change, identifying triggers, exploring social supports, and working towards setting a date to begin behavior change     Current / Ongoing Stressors and Concerns:   Client notes that things have been \"not very well.\"  Has not had enough to do at work, and has been spending several hours a day surfing the net.  Concerned that her colleagues have noticed, and wonders if she is being treated like a \"malingerer.\"  Boss has not seemed to notice, and in fact gave client a raised at her annual review last week.  Discussed automatic thoughts today in session.  Explored the role of automatic thoughts in increasing unhelpful thinking in depression, anxiety, and stress.  Explored techniques and strategies to increase awareness of unhelpful automatic thinking such as mindfulness.  Introduced concept of challenging negative thinking.               Treatment Objective(s) Addressed in This Session:    Client will use identified behavioral and cognitive skills to challenge negative self talk 90% of the time.     Intervention:   CBT: -   Discussed cognitive restructuring and behavioral activation.  Explored the connection between thoughts, feelings, and actions by using examples relative to client's presenting concerns.  Explained major domains of symptoms (cognitive, emotional, " somatic, behavioral, interpersonal) and importance of targeted and specific interventions to reduce symptoms of anxiety and depression.  Discussed role of symptom monitoring in cognitive behavioral treatment.       ASSESSMENT: Current Emotional / Mental Status (status of significant symptoms):   Risk status (Self / Other harm or suicidal ideation)   Client denies current fears or concerns for personal safety.   Client denies current or recent suicidal ideation or behaviors.   Client denies current or recent homicidal ideation or behaviors.   Client denies current or recent self injurious behavior or ideation.   Client denies other safety concerns.   A safety and risk management plan has not been developed at this time, however client was given the after-hours number / 911 should there be a change in any of these risk factors.     Appearance:   Appropriate    Eye Contact:   Good    Psychomotor Behavior: Normal    Attitude:   Cooperative    Orientation:   All   Speech    Rate / Production: Normal     Volume:  Normal    Mood:    Depressed    Affect:    Appropriate    Thought Content:  Clear    Thought Form:  Coherent  Logical    Insight:    Good      Medication Review:   No changes to current psychiatric medication(s)     Medication Compliance:   Yes     Changes in Health Issues:   None reported     Chemical Use Review:   Substance Use: Chemical use reviewed, no active concerns identified      Tobacco Use: No current tobacco use.       Collateral Reports Completed:   Not Applicable    PLAN: (Client Tasks / Therapist Tasks / Other)  1.  Client will engage in one mindfulness exercise per day using SIFT method by next session.  Client will also rate mood and intensity of mood on a SUDS scale (1-10) before and after exercise at least once by next session.  2.  Thought record sheet at next session  3.  Meet next           KAILA Quintero                                                          ________________________________________________________________________    Treatment Plan    Client's Name: Lyn Santana  YOB: 1958    Date: 5/26/2017     DSM5 Diagnoses: (Sustained by DSM5 Criteria Listed Above)  Diagnoses: 296.31 Major Depressive Disorder, Recurrent Episode, Mild _  300.02 (F41.1) Generalized Anxiety Disorder  Psychosocial & Contextual Factors: good social support  WHODAS 2.0 (12 item)            This questionnaire asks about difficulties due to health conditions. Health conditions  include  disease or illnesses, other health problems that may be short or long lasting,  injuries, mental health or emotional problems, and problems with alcohol or drugs.                     Think back over the past 30 days and answer these questions, thinking about how much  difficulty you had doing the following activities. For each question, please [place] only  one response.    S1 Standing for long periods such as 30 minutes? None =         1   S2 Taking care of household responsibilities? Moderate =   3   S3 Learning a new task, for example, learning how to get to a new place? Mild =           2   S4 How much of a problem do you have joining community activities (for example, festivals, [employer]oration[employer] or other activities) in the same way as anyone else can? Severe =       4   S5 How much have you been emotionally affected by your health problems? Severe =       4     In the past 30 days, how much difficulty did you have in:   S6 Concentrating on doing something for ten minutes? Moderate =   3   S7 Walking a long distance such as a kilometer (or equivalent)? None =         1   S8 Washing your whole body? None =         1   S9 Getting dressed? Mild =           2   S10 Dealing with people you do not know? Moderate =   3   S11 Maintaining a friendship? Severe =       4   S12 Your day to day work? Severe =       4     H1 Overall, in the past 30 days, how many days were these difficulties present?  Record number of days 20   H2 In the past 30 days, for how many days were you totally unable to carry out your usual activities or work because of any health condition? Record number of days  0   H3 In the past 30 days, not counting the days that you were totally unable, for how many days did you cut back or reduce your usual activities or work because of any health condition? Record number of days 15       Referral / Collaboration:  Referral to another professional/service is not indicated at this time..    Anticipated number of session or this episode of care: 12-18      MeasurableTreatment Goal(s) related to diagnosis / functional impairment(s)  Goal-Depression: Client will decrease depressed mood    I will know I've met my goal when I am less depressed.      Objective #A (Client Action)    Status: New - Date: 5/26/2017    Client will use identified behavioral and cognitive skills to challenge negative self talk 90% of the time.    Intervention(s)  Therapist will provide psychoeducation, behavioral activation, and cognitive restructuring.      Objective #B  Client will complete at least 10 minutes of self-regulation practice (e.g.: yoga, meditation, relaxation breathing, etc.) per day.    Status: New - Date: 5/26/2017    Intervention(s)  Therapist will provide psychoeducation, behavioral activation, and cognitive restructuring.      Objective #C  Client will exercise 30 minutes 36 times in the next 12 weeks.  Status: New - Date: 5/26/2017    Intervention(s)  Therapist will provide psychoeducation, behavioral activation, and cognitive restructuring.              Client has reviewed and agreed to the above plan.      Randy Contreras, [facility]  May 26, 2017   No

## 2024-02-19 NOTE — ED PROCEDURE NOTE - PROCEDURE ADDITIONAL DETAILS
Peripheral IV access in the Emergency Department obtained under dynamic ultrasound guidance with dark nonpulsatile blood return.  Catheter was flushed afterwards without any resistance or resulting extravasation.  IV catheter confirmed in compressible vein after insertion. [18] gauge catheter placed in a peripheral vein in the [left] upper extremity.

## 2024-02-19 NOTE — ED PROVIDER NOTE - ATTENDING CONTRIBUTION TO CARE
The patient is a 69y Female who has a past medical and surgery history of DM Cancer of lung/5L NC chronically PTED c/o SOB chronically at home placed on 6L NC Lung cancer patient last chemo was last month.    Vital Signs Last 24 Hrs  T(F): 97.9 HR: 132 BP: 112/75 RR: 22 SpO2: 94% (19 Feb 2024 11:54)   PE: as described; my additions and exceptions are noted in the chart    DATA:  EKG: pending at time of evaluation  LAB: Pending at time of evaluation    IMPRESSION/RISK:  Dx= Dyspnea in a lung ca patient   Consideration include COPD PNA PE   Plan  supportive care= nebs apap if needed IVFs   CxR CTA  trops bnp for ACS or to prognosticate if + for PE   will reassess  probable admit

## 2024-02-19 NOTE — ED ADULT NURSE NOTE - CHIEF COMPLAINT QUOTE
Patient AOx4, c/o SOB since yesterday worsening this morning. On 5L NC chronically at home placed on 6L NC Lung cancer patient last chemo was last month. Denies fevers. Charge RN aware

## 2024-02-19 NOTE — ED ADULT NURSE REASSESSMENT NOTE - NS ED NURSE REASSESS COMMENT FT1
report received from day RN patient laying in semi fowlers position on the stretcher. patient alert and oriented times four. patient denies shortness of breath, chest pain, nausea, vomiting, chill, fever. Patient normal sinus on the monitor. Respirations equal and adequate. Patients IV patent, no signs of infiltration. Safety measures in place, call bell within reach. Patient stable upon leaving the room. patient tolerating bipap well

## 2024-02-19 NOTE — CONSULT NOTE ADULT - ATTENDING COMMENTS
70 yo woman with metastatic lung cancer s/p CTx, reportedly no further DMT being offered, h/o COPD on 2L home O2, DM, presenting with SOB which has been progressive over the last week, acute hypoxic and hypercapnic respiratory failure started on BiPAP with improvement in sxs if not vbg which shows poorly compensated metabolic acidemia with lactate 9 and pCO2 48.  CTA shows only lungs riddled with metastases.  She is being treated empirically with abx for poss underlying PNA, but suspect a great deal of her sxs are 2/2 progression of her metastatic disease.  She is awake and alert, understanding conversation and asking appropriate questions, c/o thirst but says her dyspnea has improved on BiPAP.  14/8 40% Vt 400's RR 27  O2 sat 98%.  She has made herself DNR/DNI and family concurrs.  BP is stable.  Lungs sound quite junky bilat, unable to auscultate cor, thin woman without edema.  I discussed the reasons/need for NIV with pt and she is agreeable but would like a break.  D/W ED team and with pt and family member at bedside.   She may be trialed off BiPAP and on NC High Flow (not so much for oxygenation as for air hunger and pt comfort) intermittently so that she might take sips of water and have a break from BiPAP.  Agree with empiric Abx for PNA.  She is definitely in need of palliative care consult.  She is not a MICU candidate at this time.

## 2024-02-19 NOTE — ED PROVIDER NOTE - CLINICAL SUMMARY MEDICAL DECISION MAKING FREE TEXT BOX
69-year-old female, history of lung cancer on chemotherapy, last treatment was a week ago, COPD on 2 L nasal cannula at baseline, diabetes, presenting with 1 week onset of shortness of breath.  Associated symptoms of chest pain, cough.  No fever, no other symptoms. Patient is tachypneic with accessory muscle use, increased work of breathing, mentating, speaking sentences for speaking full sentences, 5 L nasal cannula.  Differentials include but not limited to most COPD exacerbation versus pneumonia versus PE.  Will get labs, BiPAP, chest x-ray, CT angio chest, reassess.

## 2024-02-19 NOTE — ED PROVIDER NOTE - PHYSICAL EXAMINATION
Gen: Patient with increased WOB, cachetic appearing, able to follow commands   HEENT: NCAT, normal conjunctiva, tongue midline, oral mucosa moist  Lung: increased WOB with accessory muscle use, no wheezes/rhonchi/rales B/L  CV: RRR, no murmurs, rubs or gallops, distal pulses 2+ b/l  Abd: soft, NT, ND, no guarding, no rigidity, no rebound tenderness, no CVA tenderness   MSK: no visible deformities, ROM normal in UE/LE  Neuro: No focal sensory or motor deficits  Skin: Warm, well perfused, no leg swelling  Psych: normal affect, calm

## 2024-02-19 NOTE — ED PROVIDER NOTE - PROGRESS NOTE DETAILS
Connie PGY3: patient now with increased WOB and SOB unable to speak full sentences now. Still mentating. Bipap ordered. MICU consult placed. Code status discussed with patient. Patient is DNR/DNI. Jeremiah PGY3: Patient endorsed to me at sign out. Seen and assessed at bedside- still tachypneic on bipap and endorsing air hunger. Trialed on morphine with some relief. AWaiting CT scans. Trop elevated, EKG was NSR. 2nd lactate elevated, CT a/p added.    Discussed case with on-call palliative team - they will discuss what other meds. Will await CT and continue GOC conversation with family. Received patient on signout from Dr. Torres pending CT rule out PE.  Patient noted to be DNR/DNI but not comfort care.  Repeat blood gas worsening pH 7.18 increased lactate 9  Patient in CAT scan at this time pending report.  Patient on BiPAP, and is DNR.  Was given antibiotics.  Noted to be hypothermic.  Will order a warming blanket will give 1 morphine for work of breathing and a bicarb.  Will consult palliative.  Family at bedside CT chest no PE.  Combination of metastatic disease and postobstructive atelectasis.  Patient evaluated by cardiology.  Note appreciated.  Would not treat by ACS at this time per cardiology.  Chart note from palliative appreciated recommends morphine 4 mg IV every 3 as needed.  MICU consulted.  Pending repeat blood gas. Idania Knott MD; [facility] PGY4: Patient to endorsed to Dr. Guzman by Dr. Ryder during s/o. MICU assessed, no MICU needs at this time. Will admit to tele with continuous pulse ox pending formal goals of care in the morning Patient evaluated by ICU attending, no ICU at this time recommend telemetry monitor with continuous pulse ox.  Patient slightly less tachypneic asking for water.  Patient still on the BiPAP will trial off to give her a chance to drink some water.  Patient to be admitted to hospitalist.  Dr. Man spoke with hospitalist updated them will admit.

## 2024-02-19 NOTE — ED ADULT TRIAGE NOTE - CHIEF COMPLAINT QUOTE
Patient AOx4, c/o SOB since yesterday worsening this morning. On 5L NC chronically at home placed on 6L. Lung cancer patient last chemo was last month. Denies fevers. Charge RN aware Patient AOx4, c/o SOB since yesterday worsening this morning. On 5L NC chronically at home placed on 6L NC Lung cancer patient last chemo was last month. Denies fevers. Charge RN aware

## 2024-02-20 NOTE — H&P ADULT - NSHPLABSRESULTS_GEN_ALL_CORE
LABS:                         10.3   20.05 )-----------( 418      ( 19 Feb 2024 13:30 )             33.3     02-19    130<L>  |  93<L>  |  11  ----------------------------<  366<H>  5.0   |  20<L>  |  0.41<L>    Ca    10.2      19 Feb 2024 13:30  Phos  3.3     02-19  Mg     1.80     02-19    TPro  8.2  /  Alb  4.0  /  TBili  0.3  /  DBili  x   /  AST  57<H>  /  ALT  24  /  AlkPhos  109  02-19    PT/INR - ( 19 Feb 2024 13:30 )   PT: 12.9 sec;   INR: 1.15 ratio    PTT - ( 19 Feb 2024 13:30 )  PTT:26.0 sec    CARDIAC MARKERS ( 19 Feb 2024 18:16 )  x     / x     / 105 U/L / x     / 11.5 ng/mL    Urinalysis Basic - ( 19 Feb 2024 13:30 )  Color: x / Appearance: x / SG: x / pH: x  Gluc: 366 mg/dL / Ketone: x  / Bili: x / Urobili: x   Blood: x / Protein: x / Nitrite: x   Leuk Esterase: x / RBC: x / WBC x   Sq Epi: x / Non Sq Epi: x / Bacteria: x    SARS-CoV-2: NotDetec (19 Feb 2024 13:53)    CAPILLARY BLOOD GLUCOSE    RADIOLOGY, EKG & ADDITIONAL TESTS: Reviewed.

## 2024-02-20 NOTE — H&P ADULT - CONVERSATION DETAILS
Discussed with son Russ Brenda (HCP) and patient at bedside regarding patient's terminal Stage IV lung cancer and likely concurrent infection that is causing her current respiratory failure. Discussed how patient is clearly not comfortable or tolerating BiPAP and that there is no further modality we can use to supplement her oxygen given DNR/DNI status. Discussed as well that her worsening metabolic acidosis is likely contributing to her discomfort and while she is receiving antibiotics and the necessary treatment, the underlying high-grade lung cancer will not improve. Unless patient makes a substantial recovery, prognosis is likely limited to days to weeks. Son stated understanding; we discussed prioritizing comfort care going forward. Specifically we talked about using pain medications to allow the patient to feel less dyspneic and decrease her pain, limiting blood draws and FSG while inpatient, and discontinuing medications unless they were immediately necessary. We will continue IV antibiotics to treat the infection with the goal of comfort in mind, discussed with son that if it does not appear to be improving the patient, then we would consider discontinuing as well. Son was in agreement. AMOSST signed and placed in chart.    Unclear if patient is comprehending conversation; appears to answer appropriately but unable to summarize what was discussed -- fixated on water.

## 2024-02-20 NOTE — H&P ADULT - NSHPREVIEWOFSYSTEMS_GEN_ALL_CORE
REVIEW OF SYSTEMS:    CONSTITUTIONAL: +weakness. No fevers or chills  EYES/ENT: No visual changes;  No vertigo or throat pain   NECK: No pain or stiffness  RESPIRATORY: +SOB, +cough No wheezing, hemoptysis  CARDIOVASCULAR: +R chest pain nonradiating, No palpitations  GASTROINTESTINAL: No abdominal or epigastric pain. No nausea, vomiting, or hematemesis; No diarrhea or constipation. No melena or hematochezia.  GENITOURINARY: No dysuria, frequency or hematuria  NEUROLOGICAL: No numbness or weakness  SKIN: No itching, rashes  MUSCULOSKELETAL: No joint pain, muscle pain.

## 2024-02-20 NOTE — ED ADULT NURSE REASSESSMENT NOTE - NS ED NURSE REASSESS COMMENT FT1
Patient was taken off of bipap as per MD Tobi Sampson. MD at bedside. Patient put on NRB for comfort. Patient passed at 0434 and organ donation was contacted. Representative Russell. Reference number [id number]

## 2024-02-20 NOTE — H&P ADULT - PROBLEM SELECTOR PLAN 7
Hospital Bundle    Fluids: IVF, PO  Electrolytes: Replete K > 4, Mg > 2, Phos > 3  Nutrition: Diet Regular  PPX  ---VTE: n/a CMO  ---GI: n/a  ---Resp: duonebs  Access: PIV  Code Status: CMO, DNR/DNI w NIV  Dispo: likely inpatient hospice if possible    Son is HCP; has daughter who is an NP.

## 2024-02-20 NOTE — H&P ADULT - HISTORY OF PRESENT ILLNESS
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 Son Russ at bedside, assisted in interview. He reported that pt has been having worsening SOB progressively since her last admission in 8/2023. He denied pt reporting any fever, chills, n/v/c/d, abd px at home. Pt fixated on water, refused to participate further in conversation, though denied any symptoms at this time.    In ED, patient was found hypoxic 94% on 6L NC, tachycardic 132bpm, afebrile, stable BP. Labs notable for leukocytosis to 20.05, elevated troponin 296 --> 502, hyperglycemia 366, and severe lactic acidosis pH 7.12 lactate 5.7 --> 11.3, pCO2 50, HCO3 16. CTCAP found worsening tumor burden, likely upper airway compression from tumor with associated atelectasis, possible nutmeg liver from RH failure, new T10 met with prior noted L5/sacral mets; no PE was found. Pt was given vanc/cef, bicarb IVP, and 2L NS. Palliative and cardiology paged. Of note, when I assessed patient at bedside, Bipap mask was nearly completely off her face with substantial air leak. Patient was found with SpO2 in 50%s on room air; replacing bipap showed improvement to low 90s. Patient admitted to medicine for further care.    At time of admission, patient was made comfort measures only.

## 2024-02-20 NOTE — H&P ADULT - NSHPSOCIALHISTORY_GEN_ALL_CORE
Lives w son. Prior smoker. Dependent for some ADL and most IADLs, reportedly independently ambulatory.

## 2024-02-20 NOTE — H&P ADULT - ASSESSMENT
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

## 2024-02-20 NOTE — H&P ADULT - PROBLEM SELECTOR PLAN 4
On basaglar 10U qhs, metformin 500mg bid, glimeperide 4mg qd at home.    - will monitor off antiglycemic agents and regular FSG given CMO status

## 2024-02-20 NOTE — H&P ADULT - PROBLEM SELECTOR PLAN 3
Stage IV neuroendocrine tumor. No longer on treatment. CTCAP showing significant tumor burden this admission.    - follows w [facility] Dr. Fredy Rodriguez; contact in AM  - palliative and hospice consulted; pending recs -- inpt hospice?

## 2024-02-20 NOTE — H&P ADULT - PROBLEM SELECTOR PLAN 6
Hospital Bundle    Fluids: IVF, PO  Electrolytes: Replete K > 4, Mg > 2, Phos > 3  Nutrition: Diet Regular  PPX  ---VTE: n/a CMO  ---GI: n/a  ---Resp: duonebs  Access: PIV  Code Status: CMO, DNR/DNI w NIV  Dispo: likely inpatient hospice if possible    Son is HCP; has daughter who is an NP. - GOC discussion with resident and I present. MOLST form completed  - DNR/DNI, c/w NIV, comfort care

## 2024-02-20 NOTE — CHART NOTE - NSCHARTNOTEFT_GEN_A_CORE
DEATH NOTE    Called to bedside to evaluate the patient for apnea and asystole.     On physical exam, patient did not respond to verbal or noxious stimuli.  No spontaneous respirations.  Absent heart and breath sounds.  Absent radial and carotid pulses.   Pupils are fixed and dilated, no corneal reflex.  EKG rhythm strip shows asystole.   Patient pronounced dead at Feb 20, 2024 4:34 AM. Attending notified.  Family declined autopsy.
ER paged palliative service for recommendations managing respiratory distress in patient with lung cancer, DNR/DNI per ER, on Bipap.  Morphine 2 mg x2 given by ED with minimial improvement stated.   Follows at [facility] [place], [place].  Patient does not have [facility] conversation making her comfort measures as of yet.  To help facilitate patient care and improvement, following recs suggested with holding parameters:    - Continuous monitoring of vitals, respirations  - Morphine 4 mg IV q3h prn - HOLD FOR RR<12, SBP <90  - Goals of care need to be clarified   - Narcan prn for suspected opioid overdose  - Formal palliative consult pending

## 2024-02-20 NOTE — ED ADULT NURSE REASSESSMENT NOTE - NS ED NURSE REASSESS COMMENT FT1
patient laying in semi fowlers position on the stretcher. patient alert and oriented times four. patient denies shortness of breath, chest pain, nausea, vomiting, chill, fever. Patient normal sinus on the monitor. Respirations equal and adequate. Patients IV patent, no signs of infiltration. Safety measures in place, call bell within reach. Patient stable upon leaving the room. MD JIM LACEY at bedside comfort measures started. left upper arm IV came out while patient was moving. 20 right hand intact.

## 2024-02-20 NOTE — DISCHARGE NOTE FOR THE EXPIRED PATIENT - NS PRELIMINARY CAUSE OF DEATH_RESTRICTED
Cardiopulmonary Arrest/Multi-organ Dysfunction Syndrome/Sepsis Cancer/Cardiopulmonary Arrest/Multi-organ Dysfunction Syndrome/Sepsis

## 2024-02-20 NOTE — H&P ADULT - ATTENDING COMMENTS
This is a 68 y/o F with pmhx of lung cancer no longer on chemotherapy, presented to the ED for new onset shortness of breath. The symptoms have been occurring for the last 1.5 weeks and gradually became worse. The patient is no longer on chemo because the disease is rapidly progressing, no longer a candidate. The patient denies ROS otherwise, only endorsed SOB. Denies fevers, chest pain, abdominal pain, dysuria.    Physical exam shows a cachetic female, on BiPAP, lungs has significant rhonchi and crackles throughout the lung fields b/l, cardiac s1s2 RRR no murmurs, abdomen soft nontender to palpation b/l, no LE edema noted    Labs                          10.3   20.05 )-----------( 418      ( 19 Feb 2024 13:30 )             33.3       02-19    130<L>  |  93<L>  |  11  ----------------------------<  366<H>  5.0   |  20<L>  |  0.41<L>    Ca    10.2      19 Feb 2024 13:30  Phos  3.3     02-19  Mg     1.80     02-19    TPro  8.2  /  Alb  4.0  /  TBili  0.3  /  DBili  x   /  AST  57<H>  /  ALT  24  /  AlkPhos  109  02-19      CARDIAC MARKERS ( 19 Feb 2024 18:16 )  x     / x     / 105 U/L / x     / 11.5 ng/mL        PT/INR - ( 19 Feb 2024 13:30 )   PT: 12.9 sec;   INR: 1.15 ratio         PTT - ( 19 Feb 2024 13:30 )  PTT:26.0 sec    22:32 - VBG - pH: 7.12  | pCO2: 50    | pO2: 121   | Lactate: 11.3   17:21 - VBG - pH: 7.18  | pCO2: 48    | pO2: 36    | Lactate: 9.1    13:30 - VBG - pH: 7.33  | pCO2: 44    | pO2: 43    | Lactate: 5.7        Urinalysis Basic - ( 19 Feb 2024 13:30 )    Color: x / Appearance: x / SG: x / pH: x  Gluc: 366 mg/dL / Ketone: x  / Bili: x / Urobili: x   Blood: x / Protein: x / Nitrite: x   Leuk Esterase: x / RBC: x / WBC x   Sq Epi: x / Non Sq Epi: x / Bacteria: x            CXR: As per my read - diffuse b/l opacities, Will wait for prelim/official read      CT:    IMPRESSION:  No acute pulmonary embolism.    Similar heterogeneous consolidation of right lower lobe with air   bronchograms probably due to combination of metastatic disease and   postobstructive atelectasis    Progressive widespread bilateral pulmonary, mediastinal and hilar zoran,   right pleural, diaphragmatic, chest wall, bilateral adrenal, and bone   metastatic disease as described.      The patient is admitted for severe hypoxic respiratory failure secondary to worsening lung cancer and advancement of metastatic disease. The patient also has possible sepsis with elevated leukocytosis and tachypnea. Will start empiric abx. Will c/w bipap for now.  Extensive goals of care discussion with the patient and son at bedside, who is the HCP. He is requesting DNR/DNI, however agreeable for NIV. The son is requesting comfort care measures. MOLST form completed. Will give dilaudid prn for pain control and respiratory distress. Pain and palliative consult for inpatient hospice. c/w Bipap as tolerated. Explained to the son that the patient is likely to pass given worsening lactic acidosis and worsening lung disease.

## 2024-02-20 NOTE — H&P ADULT - NSHPPHYSICALEXAM_GEN_ALL_CORE
T(C): 36.1 (02-19-24 @ 21:07), Max: 36.6 (02-19-24 @ 11:54)  T(F): 97 (02-19-24 @ 21:07), Max: 97.9 (02-19-24 @ 11:54)  HR: 110 (02-20-24 @ 01:45) (110 - 145)  BP: 89/71 (02-20-24 @ 01:45) (89/71 - 121/60)  BP(mean): --  RR: 24 (02-20-24 @ 01:45) (22 - 36)  SpO2: 89% (02-20-24 @ 01:45) (89% - 100%) on BIPAP    PHYSICAL EXAM:  GENERAL: NAD, cachectic appearing  HEAD:  Atraumatic, Normocephalic  EYES: EOMI, PERRLA, conjunctiva and sclera clear  ENMT: No tonsillar erythema, exudates, or enlargement; Moist mucous membranes  NECK: Supple, No JVD, Normal thyroid  HEART: +tachycardic. Regular rhythm; No murmurs, rubs, or gallops  RESPIRATORY: +gross crackles b/l, upper airway sounds; breath sounds   ABDOMEN: Soft, Nontender, Nondistended; Bowel sounds present  NEUROLOGY: A&O grossly, nonfocal, moving all extremities  EXTREMITIES:  2+ Peripheral Pulses, No clubbing, cyanosis, or edema  SKIN: warm, dry, normal color, no rash or abnormal lesions

## 2024-02-20 NOTE — DISCHARGE NOTE FOR THE EXPIRED PATIENT - HOSPITAL COURSE
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 In ED, patient was found hypoxic 94% on 6L NC, tachycardic 132bpm, afebrile, stable BP. Labs notable for leukocytosis to 20.05, elevated troponin 296 --> 502, hyperglycemia 366, and severe lactic acidosis pH 7.12 lactate 5.7 --> 11.3, pCO2 50, HCO3 16. CTCAP found worsening tumor burden, likely upper airway compression from tumor with associated atelectasis, possible nutmeg liver from RH failure, new T10 met with prior noted L5/sacral mets; no PE was found. Pt was given vanc/cef, bicarb IVP, and 2L NS. Palliative and cardiology paged. Of note, when I assessed patient at bedside, Bipap mask was nearly completely off her face with substantial air leak. Patient was found with SpO2 in 50%s on room air; replacing bipap showed improvement to low 90s. Patient admitted to medicine for further care.     GoC was discussed with son and patient at bedside and the decision was made to continue with comfort measures only. At 4:30AM, patient had pulled BIPAP mask off face; while replacing with NRB, patient was found apneic and unresponsive. Pulses unable to be palpated. At 4:34AM, patient was pronounced . Attending notified. 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 In ED, patient was found hypoxic 94% on 6L NC, tachycardic 132bpm, afebrile, stable BP. Labs notable for leukocytosis to 20.05, elevated troponin 296 --> 502, hyperglycemia 366, and severe lactic acidosis pH 7.12 lactate 5.7 --> 11.3, pCO2 50, HCO3 16. CTCAP found worsening tumor burden, likely upper airway compression from tumor with associated atelectasis, possible nutmeg liver from RH failure, new T10 met with prior noted L5/sacral mets; no PE was found. Pt was given vanc/cef, bicarb IVP, and 2L NS. Palliative and cardiology paged. Of note, when I assessed patient at bedside, Bipap mask was nearly completely off her face with substantial air leak. Patient was found with SpO2 in 50%s on room air; replacing bipap showed improvement to low 90s. Patient admitted to medicine for further care.     GoC was discussed with son and patient at bedside and the decision was made to continue with comfort measures only. At 4:30AM, patient had pulled BIPAP mask off face; while replacing with NRB, patient was found apneic and unresponsive. Pulses unable to be palpated. At 4:34AM, patient was pronounced . Attending notified.    Attending Addendum:    Patient seen at bedside, patient found to be apneic and unresponsive. Son is at bedside, is aware the patient passed away. All questions answered.

## 2024-02-20 NOTE — H&P ADULT - PROBLEM SELECTOR PLAN 2
Lactate uptrending to 11.3 likely from end organ ischemia. s/p IVF resuscitation, BP stable.    - will continue vanc/cef for now  - trend fever curve  - f/u BCx, UCx  - will not repeat blood work given CMO

## 2024-02-20 NOTE — H&P ADULT - PROBLEM SELECTOR PLAN 1
Requiring BIPAP to maintain SpO2 > 88%, likely iso worsening tumor burden with mass effect, concurrent infection, possible aspiration. Dyspnea also likely due to severe metabolic lactic acidosis.     - c/w bipap as tolerated; if unable, will transition to NRB -- discussed with son and will tolerate hypoxia iso CMO  - c/w duonebs q6h  - c/w dilaudid IVP for air hunger

## 2024-02-24 LAB
CULTURE RESULTS: SIGNIFICANT CHANGE UP
CULTURE RESULTS: SIGNIFICANT CHANGE UP
SPECIMEN SOURCE: SIGNIFICANT CHANGE UP
SPECIMEN SOURCE: SIGNIFICANT CHANGE UP

## 2024-02-25 PROCEDURE — 76604 US EXAM CHEST: CPT | Mod: 26
